# Patient Record
Sex: MALE | Race: WHITE | Employment: OTHER | URBAN - METROPOLITAN AREA
[De-identification: names, ages, dates, MRNs, and addresses within clinical notes are randomized per-mention and may not be internally consistent; named-entity substitution may affect disease eponyms.]

---

## 2021-08-19 ENCOUNTER — APPOINTMENT (OUTPATIENT)
Dept: CT IMAGING | Age: 71
DRG: 177 | End: 2021-08-19
Payer: MEDICARE

## 2021-08-19 ENCOUNTER — APPOINTMENT (OUTPATIENT)
Dept: GENERAL RADIOLOGY | Age: 71
DRG: 177 | End: 2021-08-19
Payer: MEDICARE

## 2021-08-19 ENCOUNTER — HOSPITAL ENCOUNTER (INPATIENT)
Age: 71
LOS: 12 days | Discharge: HOME | DRG: 177 | End: 2021-08-31
Attending: EMERGENCY MEDICINE | Admitting: INTERNAL MEDICINE
Payer: MEDICARE

## 2021-08-19 DIAGNOSIS — J18.9 PNEUMONIA OF BOTH LOWER LOBES DUE TO INFECTIOUS ORGANISM: ICD-10-CM

## 2021-08-19 DIAGNOSIS — J96.01 ACUTE RESPIRATORY FAILURE WITH HYPOXIA (HCC): ICD-10-CM

## 2021-08-19 DIAGNOSIS — Z20.822 SUSPECTED COVID-19 VIRUS INFECTION: Primary | ICD-10-CM

## 2021-08-19 LAB
A/G RATIO: 1 (ref 1.1–2.2)
ALBUMIN SERPL-MCNC: 3.3 G/DL (ref 3.4–5)
ALP BLD-CCNC: 47 U/L (ref 40–129)
ALT SERPL-CCNC: 34 U/L (ref 10–40)
ANION GAP SERPL CALCULATED.3IONS-SCNC: 15 MMOL/L (ref 3–16)
AST SERPL-CCNC: 49 U/L (ref 15–37)
BASOPHILS ABSOLUTE: 0 K/UL (ref 0–0.2)
BASOPHILS RELATIVE PERCENT: 0.5 %
BILIRUB SERPL-MCNC: 0.8 MG/DL (ref 0–1)
BUN BLDV-MCNC: 22 MG/DL (ref 7–20)
CALCIUM SERPL-MCNC: 8.6 MG/DL (ref 8.3–10.6)
CHLORIDE BLD-SCNC: 99 MMOL/L (ref 99–110)
CO2: 21 MMOL/L (ref 21–32)
CREAT SERPL-MCNC: 1.1 MG/DL (ref 0.8–1.3)
D DIMER: 1938 NG/ML DDU (ref 0–229)
EOSINOPHILS ABSOLUTE: 0 K/UL (ref 0–0.6)
EOSINOPHILS RELATIVE PERCENT: 0 %
FERRITIN: 3043 NG/ML (ref 30–400)
GFR AFRICAN AMERICAN: >60
GFR NON-AFRICAN AMERICAN: >60
GLOBULIN: 3.3 G/DL
GLUCOSE BLD-MCNC: 123 MG/DL (ref 70–99)
HCT VFR BLD CALC: 42.6 % (ref 40.5–52.5)
HEMOGLOBIN: 14.5 G/DL (ref 13.5–17.5)
LACTATE DEHYDROGENASE: 492 U/L (ref 100–190)
LACTIC ACID: 1.9 MMOL/L (ref 0.4–2)
LYMPHOCYTES ABSOLUTE: 0.7 K/UL (ref 1–5.1)
LYMPHOCYTES RELATIVE PERCENT: 10.1 %
MCH RBC QN AUTO: 29.6 PG (ref 26–34)
MCHC RBC AUTO-ENTMCNC: 34.1 G/DL (ref 31–36)
MCV RBC AUTO: 86.9 FL (ref 80–100)
MONOCYTES ABSOLUTE: 0.3 K/UL (ref 0–1.3)
MONOCYTES RELATIVE PERCENT: 4.8 %
NEUTROPHILS ABSOLUTE: 6 K/UL (ref 1.7–7.7)
NEUTROPHILS RELATIVE PERCENT: 84.6 %
PDW BLD-RTO: 14.2 % (ref 12.4–15.4)
PLATELET # BLD: 170 K/UL (ref 135–450)
PMV BLD AUTO: 7.7 FL (ref 5–10.5)
POTASSIUM REFLEX MAGNESIUM: 4 MMOL/L (ref 3.5–5.1)
PRO-BNP: 216 PG/ML (ref 0–124)
PROCALCITONIN: 0.11 NG/ML (ref 0–0.15)
RBC # BLD: 4.91 M/UL (ref 4.2–5.9)
SODIUM BLD-SCNC: 135 MMOL/L (ref 136–145)
TOTAL CK: 325 U/L (ref 39–308)
TOTAL PROTEIN: 6.6 G/DL (ref 6.4–8.2)
TROPONIN: <0.01 NG/ML
WBC # BLD: 7.1 K/UL (ref 4–11)

## 2021-08-19 PROCEDURE — 6370000000 HC RX 637 (ALT 250 FOR IP): Performed by: PHYSICIAN ASSISTANT

## 2021-08-19 PROCEDURE — 2580000003 HC RX 258: Performed by: INTERNAL MEDICINE

## 2021-08-19 PROCEDURE — 6360000004 HC RX CONTRAST MEDICATION: Performed by: EMERGENCY MEDICINE

## 2021-08-19 PROCEDURE — XW033E5 INTRODUCTION OF REMDESIVIR ANTI-INFECTIVE INTO PERIPHERAL VEIN, PERCUTANEOUS APPROACH, NEW TECHNOLOGY GROUP 5: ICD-10-PCS | Performed by: EMERGENCY MEDICINE

## 2021-08-19 PROCEDURE — 82550 ASSAY OF CK (CPK): CPT

## 2021-08-19 PROCEDURE — 82728 ASSAY OF FERRITIN: CPT

## 2021-08-19 PROCEDURE — 96375 TX/PRO/DX INJ NEW DRUG ADDON: CPT

## 2021-08-19 PROCEDURE — 84145 PROCALCITONIN (PCT): CPT

## 2021-08-19 PROCEDURE — 83615 LACTATE (LD) (LDH) ENZYME: CPT

## 2021-08-19 PROCEDURE — 80053 COMPREHEN METABOLIC PANEL: CPT

## 2021-08-19 PROCEDURE — 99284 EMERGENCY DEPT VISIT MOD MDM: CPT

## 2021-08-19 PROCEDURE — 1200000000 HC SEMI PRIVATE

## 2021-08-19 PROCEDURE — 87040 BLOOD CULTURE FOR BACTERIA: CPT

## 2021-08-19 PROCEDURE — 6360000002 HC RX W HCPCS: Performed by: PHYSICIAN ASSISTANT

## 2021-08-19 PROCEDURE — XW033H5 INTRODUCTION OF TOCILIZUMAB INTO PERIPHERAL VEIN, PERCUTANEOUS APPROACH, NEW TECHNOLOGY GROUP 5: ICD-10-PCS | Performed by: EMERGENCY MEDICINE

## 2021-08-19 PROCEDURE — 96374 THER/PROPH/DIAG INJ IV PUSH: CPT

## 2021-08-19 PROCEDURE — 2700000000 HC OXYGEN THERAPY PER DAY

## 2021-08-19 PROCEDURE — 84484 ASSAY OF TROPONIN QUANT: CPT

## 2021-08-19 PROCEDURE — 71045 X-RAY EXAM CHEST 1 VIEW: CPT

## 2021-08-19 PROCEDURE — 2580000003 HC RX 258: Performed by: PHYSICIAN ASSISTANT

## 2021-08-19 PROCEDURE — 85025 COMPLETE CBC W/AUTO DIFF WBC: CPT

## 2021-08-19 PROCEDURE — U0005 INFEC AGEN DETEC AMPLI PROBE: HCPCS

## 2021-08-19 PROCEDURE — 85379 FIBRIN DEGRADATION QUANT: CPT

## 2021-08-19 PROCEDURE — 6360000002 HC RX W HCPCS: Performed by: INTERNAL MEDICINE

## 2021-08-19 PROCEDURE — 83880 ASSAY OF NATRIURETIC PEPTIDE: CPT

## 2021-08-19 PROCEDURE — U0003 INFECTIOUS AGENT DETECTION BY NUCLEIC ACID (DNA OR RNA); SEVERE ACUTE RESPIRATORY SYNDROME CORONAVIRUS 2 (SARS-COV-2) (CORONAVIRUS DISEASE [COVID-19]), AMPLIFIED PROBE TECHNIQUE, MAKING USE OF HIGH THROUGHPUT TECHNOLOGIES AS DESCRIBED BY CMS-2020-01-R: HCPCS

## 2021-08-19 PROCEDURE — 94761 N-INVAS EAR/PLS OXIMETRY MLT: CPT

## 2021-08-19 PROCEDURE — 71260 CT THORAX DX C+: CPT

## 2021-08-19 PROCEDURE — 36415 COLL VENOUS BLD VENIPUNCTURE: CPT

## 2021-08-19 PROCEDURE — 83605 ASSAY OF LACTIC ACID: CPT

## 2021-08-19 PROCEDURE — 93005 ELECTROCARDIOGRAM TRACING: CPT | Performed by: PHYSICIAN ASSISTANT

## 2021-08-19 RX ORDER — ACETAMINOPHEN 500 MG
1000 TABLET ORAL ONCE
Status: COMPLETED | OUTPATIENT
Start: 2021-08-19 | End: 2021-08-19

## 2021-08-19 RX ORDER — AZITHROMYCIN 250 MG/1
500 TABLET, FILM COATED ORAL DAILY
Status: DISCONTINUED | OUTPATIENT
Start: 2021-08-20 | End: 2021-08-20

## 2021-08-19 RX ORDER — IPRATROPIUM BROMIDE AND ALBUTEROL SULFATE 2.5; .5 MG/3ML; MG/3ML
1 SOLUTION RESPIRATORY (INHALATION) EVERY 4 HOURS PRN
Status: DISCONTINUED | OUTPATIENT
Start: 2021-08-19 | End: 2021-08-25

## 2021-08-19 RX ORDER — DEXAMETHASONE SODIUM PHOSPHATE 10 MG/ML
6 INJECTION, SOLUTION INTRAMUSCULAR; INTRAVENOUS ONCE
Status: COMPLETED | OUTPATIENT
Start: 2021-08-19 | End: 2021-08-19

## 2021-08-19 RX ORDER — SODIUM CHLORIDE 9 MG/ML
INJECTION, SOLUTION INTRAVENOUS CONTINUOUS
Status: DISCONTINUED | OUTPATIENT
Start: 2021-08-19 | End: 2021-08-22

## 2021-08-19 RX ADMIN — SODIUM CHLORIDE: 9 INJECTION, SOLUTION INTRAVENOUS at 22:48

## 2021-08-19 RX ADMIN — IOPAMIDOL 75 ML: 755 INJECTION, SOLUTION INTRAVENOUS at 13:31

## 2021-08-19 RX ADMIN — ACETAMINOPHEN 1000 MG: 500 TABLET ORAL at 12:41

## 2021-08-19 RX ADMIN — ENOXAPARIN SODIUM 40 MG: 40 INJECTION SUBCUTANEOUS at 22:42

## 2021-08-19 RX ADMIN — DEXAMETHASONE SODIUM PHOSPHATE 6 MG: 10 INJECTION, SOLUTION INTRAMUSCULAR; INTRAVENOUS at 12:55

## 2021-08-19 RX ADMIN — Medication 1000 MG: at 15:39

## 2021-08-19 RX ADMIN — AZITHROMYCIN MONOHYDRATE 500 MG: 500 INJECTION, POWDER, LYOPHILIZED, FOR SOLUTION INTRAVENOUS at 15:46

## 2021-08-19 ASSESSMENT — ENCOUNTER SYMPTOMS
CONSTIPATION: 0
WHEEZING: 0
CHEST TIGHTNESS: 0
COUGH: 1
EYE REDNESS: 0
BACK PAIN: 0
ABDOMINAL PAIN: 0
STRIDOR: 0
EYE DISCHARGE: 0
NAUSEA: 0
COLOR CHANGE: 0
DIARRHEA: 0
VOMITING: 0
SHORTNESS OF BREATH: 1

## 2021-08-19 ASSESSMENT — PAIN SCALES - GENERAL
PAINLEVEL_OUTOF10: 0
PAINLEVEL_OUTOF10: 7
PAINLEVEL_OUTOF10: 0

## 2021-08-19 NOTE — ED NOTES
EXAMINATION:  CHEST SINGLE (PORTABLE)    



INDICATION: Shortness of breath, respiratory distress



COMPARISON: Multiple prior chest radiograph, most recently of 9/15/2019

     

FINDINGS:



LINES/TUBES:Right IJ central venous catheter terminates at the superior

cavoatrial junction. EKG leads overlie the chest.



LUNGS:The lungs are moderately inflated. There is perihilar fullness and

indistinctness of the pulmonary vasculature.



PLEURA:Small circumferential right pleural effusion. No pneumothorax.



MEDIASTINUM:Cardiomediastinal silhouette is stably enlarged.



BONES/SOFT TISSUES:No acute osseous injury.



ABDOMEN:No free air under the diaphragm.





IMPRESSION: 

Continued interval worsening of pulmonary edema. Unchanged cardiomegaly.



Small circumferential right pleural effusion.



Signed by: Kelechi Mendez MD on 9/16/2019 8:32 AM Report to [facility] on 5T.   Up to floor per meaghan Robles RN  08/19/21 5080

## 2021-08-19 NOTE — ED PROVIDER NOTES
[facility]        Pt Name: Vargas Carvalho  MRN: [id number]  [place] 1950  Date of evaluation: 8/19/2021  Provider: QUINITN Crenshaw  PCP: Anders Morse MD  Note Started: 12:18 PM EDT        I have seen and evaluated this patient with my supervising physician Matt Jarvis, [facility]       Chief Complaint   Patient presents with    Shortness of Breath     Pt endorses shortness of breath and cough for \"a couple weeks. \" Pt is not vaccinated against covid-19. Pt arrived on RA with oxygen saturation 83%. Pt placed on 6L NC with rebound to 94%.  Cough       HISTORY OF PRESENT ILLNESS   (Location, Timing/Onset, Context/Setting, Quality, Duration, Modifying Factors, Severity, Associated Signs and Symptoms)  Note limiting factors. Chief Complaint: SOB/Cough     Vargas Carvalho is a 69 y.o. male with no significant past medical history who presents to the ED with complaint of shortness of breath. Patient states he has not been feeling well for the past couple weeks. Patient states he has had nonproductive cough. Patient states he had increasing shortness of breath over the past week. Arrived to the emergency department was 83% on room air. Patient placed on 6 L nasal cannula oxygen for hypoxia. Patient states he has shortness of breath worsened with exertion and associated cough. Denies any fever chills. Denies headache, lightheadedness/dizziness, diaphoresis, chest pain, pleuritic pain, hemoptysis, orthopnea, pedal edema or calf tenderness. Denies abdominal pain, nausea/vomiting, urinary symptoms or changes in bowel movements. Patient states he believes he got sick from his grandson. Patient denies any known exposure to positive COVID-19 persons. Patient states he has not been vaccinated against COVID-19. Patient states is not a smoker. Denies history of COPD or asthma.   Denies history of heart or lung problems. Denies history of CHF. Patient denies any history of DVT or PE in the past.  Denies any recent travel, trips, surgery or immobilization. Denies any hormone usage or blood thinning medication usage. Nursing Notes were all reviewed and agreed with or any disagreements were addressed in the HPI. REVIEW OF SYSTEMS    (2-9 systems for level 4, 10 or more for level 5)     Review of Systems   Constitutional: Negative for activity change, appetite change, chills, diaphoresis, fatigue and fever. Eyes: Negative for discharge and redness. Respiratory: Positive for cough and shortness of breath. Negative for chest tightness, wheezing and stridor. Cardiovascular: Negative. Negative for chest pain, palpitations and leg swelling. Gastrointestinal: Negative for abdominal pain, constipation, diarrhea, nausea and vomiting. Genitourinary: Negative for decreased urine volume, difficulty urinating, dysuria, flank pain, frequency, hematuria and urgency. Musculoskeletal: Negative for arthralgias, back pain, myalgias, neck pain and neck stiffness. Skin: Negative for color change, pallor, rash and wound. Neurological: Negative for dizziness, light-headedness and headaches. Positives and Pertinent negatives as per HPI. Except as noted above in the ROS, all other systems were reviewed and negative. PAST MEDICAL HISTORY   History reviewed. No pertinent past medical history. SURGICAL HISTORY     Past Surgical History:   Procedure Laterality Date    APPENDECTOMY      SKIN BIOPSY      TONSILLECTOMY           CURRENTMEDICATIONS       Previous Medications    MUPIROCIN (BACTROBAN) 2 % CREAM    Apply  topically 3 times daily. ALLERGIES     Patient has no known allergies.     FAMILYHISTORY       Family History   Problem Relation Age of Onset    Diabetes Father     Stroke Father           SOCIAL HISTORY       Social History     Tobacco Use    Smoking status: Never Smoker   Substance Use Topics    Alcohol use: No    Drug use: No       SCREENINGS             PHYSICAL EXAM    (up to 7 for level 4, 8 or more for level 5)     ED Triage Vitals [08/19/21 1208]   BP Temp Temp Source Pulse Resp SpO2 Height Weight   (!) 150/71 101.5 °F (38.6 °C) Oral 77 22 95 % 5' 10\" (1.778 m) 220 lb (99.8 kg)       Physical Exam  Constitutional:       General: He is not in acute distress. Appearance: He is well-developed. He is not ill-appearing, toxic-appearing or diaphoretic. HENT:      Head: Normocephalic and atraumatic. Right Ear: External ear normal.      Left Ear: External ear normal.      Mouth/Throat:      Mouth: Mucous membranes are moist.      Pharynx: No oropharyngeal exudate or posterior oropharyngeal erythema. Eyes:      General:         Right eye: No discharge. Left eye: No discharge. Conjunctiva/sclera: Conjunctivae normal.   Cardiovascular:      Rate and Rhythm: Normal rate and regular rhythm. Pulses: Normal pulses. Heart sounds: Normal heart sounds. No murmur heard. No friction rub. No gallop. Comments: 2+ radial pulses bilaterally. No pedal edema. No calf tenderness. No JVD. Pulmonary:      Effort: Pulmonary effort is normal. No respiratory distress. Breath sounds: Normal breath sounds. No stridor. No wheezing, rhonchi or rales. Comments: Patient 83% on room air upon arrival.  Patient was on 6 L nasal cannula oxygen when I enter the room. Able to wean down to 4 L nasal cannula oxygen with oxygen saturation at 93 to 94%. No wheezing rales or rhonchi. Chest:      Chest wall: No tenderness. Abdominal:      General: Abdomen is flat. Bowel sounds are normal. There is no distension. Palpations: Abdomen is soft. There is no mass. Tenderness: There is no abdominal tenderness. There is no right CVA tenderness, left CVA tenderness, guarding or rebound. Hernia: No hernia is present.    Musculoskeletal:         General: Normal range of [address],  [address], [address]   Phone [phone]   D-DIMER, QUANTITATIVE - Abnormal; Notable for the following components:    D-Dimer, Quant 1939 (*)     All other components within normal limits    Narrative:     Performed at:  [facility] [address],  [address], [address]   Phone [phone]   CULTURE, BLOOD 1   CULTURE, BLOOD 2   TROPONIN    Narrative:     Performed at:  [facility] [address],  [address] [address], [address]   Phone [phone]   PROCALCITONIN    Narrative:     Performed at:  [facility] [address],  [address], [address]   Phone [phone]   LACTIC ACID, PLASMA    Narrative:     Performed at:  [facility] [address],  [address], [address]   Phone [phone]       When ordered only abnormal lab results are displayed. All other labs were within normal range or not returned as of this dictation. EKG: When ordered, EKG's are interpreted by the Emergency Department Physician in the absence of a cardiologist.  Please see their note for interpretation of EKG. RADIOLOGY:   Non-plain film images such as CT, Ultrasound and MRI are read by the radiologist. Plain radiographic images are visualized and preliminarily interpreted by the ED Provider with the below findings:        Interpretation per the Radiologist below, if available at the time of this note:    CT CHEST PULMONARY EMBOLISM W CONTRAST   Final Result   Scattered ground-glass opacities throughout the lungs bilaterally consistent   with atypical pneumonia. No evidence for acute or chronic pulmonary embolism. XR CHEST PORTABLE   Final Result   Scattered bilateral pulmonary infiltrates consistent with pneumonia. No results found.         PROCEDURES   Unless otherwise noted below, none     Procedures    CRITICAL CARE TIME   The total critical care time spent while evaluating and treating this patient was 33 minutes. This excludes time spent doing separately billable procedures. This includes time at the bedside, data interpretation, medication management, obtaining critical history from collateral sources if the patient is unable to provide it directly, and physician consultation. Specifics of interventions taken and potentially life-threatening diagnostic considerations are listed above in the medical decision making. CONSULTS:  None      EMERGENCY DEPARTMENT COURSE and DIFFERENTIAL DIAGNOSIS/MDM:   Vitals:    Vitals:    08/19/21 1215 08/19/21 1300 08/19/21 1315 08/19/21 1330   BP:  135/67 129/60 119/65   Pulse:       Resp:  28 30 22   Temp:       TempSrc:       SpO2: 93% 96% 94% 95%   Weight:       Height:           Patient was given the following medications:  Medications   cefTRIAXone (ROCEPHIN) 1000 mg in sterile water 10 mL IV syringe (has no administration in time range)   azithromycin (ZITHROMAX) 500 mg in D5W 250ml Vial Mate (has no administration in time range)   dexamethasone (PF) (DECADRON) injection 6 mg (6 mg Intravenous Given 8/19/21 1255)   acetaminophen (TYLENOL) tablet 1,000 mg (1,000 mg Oral Given 8/19/21 1241)   iopamidol (ISOVUE-370) 76 % injection 75 mL (75 mLs Intravenous Given 8/19/21 1331)           Patient is a 39-year-old male who presents to the ED with complaint of shortness of breath and cough. Has been sick for the past 2 weeks worse over the past week. Patient initially told me that no one at home was sick with COVID-19 but then told attending that grandson tested positive for COVID-19 recently. Patient is not vaccinated. Arrived to the emergency department was 83% on room air. Patient was on 6 L nasal cannula oxygen but able to be weaned down to 4 L nasal cannula oxygen with oxygen saturation 94 to 95%. Patient given Tylenol for fever here in the ED. IV established and blood work obtained.   Given 6 mg of IV Decadron given hypoxia and suspected COVID-19 infection. Covid swab ordered. CBC showed normal white count, hemoglobin and platelets. CMP relatively unremarkable. Troponin was normal.  . . . Ferritin pending. Procalcitonin normal.  Lactic acid normal.  D-dimer 1938. EKG interpreted by attending. Chest x-ray showed bilateral pulmonary infiltrates concerning for pneumonia. CT of the chest obtained given concern for COVID-19 with elevated D-dimer. CT showed Groundglass opacities concerning for atypical pneumonia. Did give Rocephin and azithromycin for antibiotic coverage but believe most likely viral at this time given patient's suspected COVID-19 infection with positive Covid contacts at home with normal white count, lactic acid and procalcitonin. Patient will require admission given hypoxia and supplemental oxygen requirement. Case discussed with Dr. Freddie Griggs who graciously agreed accept patient for admission at this time. FINAL IMPRESSION      1. Suspected COVID-19 virus infection    2. Acute respiratory failure with hypoxia (HCC)    3. Pneumonia of both lower lobes due to infectious organism          DISPOSITION/PLAN   DISPOSITION Decision To Admit 08/19/2021 02:16:19 PM      PATIENT REFERRED TO:  No follow-up provider specified.     DISCHARGE MEDICATIONS:  New Prescriptions    No medications on file       DISCONTINUED MEDICATIONS:  Discontinued Medications    No medications on file              (Please note that portions of this note were completed with a voice recognition program.  Efforts were made to edit the dictations but occasionally words are mis-transcribed.)    QUINTIN Fleming (electronically signed)          QUINTIN Desai  08/19/21 3584

## 2021-08-19 NOTE — PROGRESS NOTES
Patient has arrived to unit in stable condition via w/c propelled by jacey SHIN. Vitals stable. Patient is awake, alert and oriented, though lethargy noted. Pt with noted diaphoresis. Respirations unlabored at rest on 4L NC. Patient does not appear to be in distress. Patient oriented to room and call light. Plan of care discussed with patient, patient agreeable. Call light within reach.

## 2021-08-19 NOTE — ED NOTES
Pt was able to be weaned from 6L NC to 4L NC after sat recovery. Pt is currently on 4L NC with oxygen saturation at 93%.      Toni Steele RN  08/19/21 4146

## 2021-08-19 NOTE — ED PROVIDER NOTES
I independently performed a history and physical on Rosenda Soliman. All diagnostic, treatment, and disposition decisions were made by myself in conjunction with the advanced practice provider. Briefly, this is a 69 y.o. male here for shortness of breath. Patient reports feeling ill with malaise and muscle aches for the past 2 weeks, his symptoms began to worsen a couple days ago. Has been in contact with his 66-year-old grandson who tested positive for COVID-19. Patient is not vaccinated. He denies any chest pain or other painful complaints. Patient noted to have oxygenation of 83% on room air at time of arrival to emergency department. He denies any prior history of tobacco use or pulmonary disease. On exam, patient febrile, mildly ill-appearing however nontoxic. No distress. Heart RRR. Lungs CTAB. Abdomen soft, nondistended, nontender to palpation in all quadrants. EKG  EKG was reviewed by emergency department physician in the absence of a cardiologist    Narrow complex sinus rhythm, rate 72, normal axis, normal MN and QRS intervals, normal Qtc, no ST elevations or depressions, normal t-wave morphology, impression NSR, no STEMI, no comparison available      Screenings            MDM    Patient febrile, nontoxic. Patient's initial hypoxia was corrected with supplemental oxygen by nasal cannula, he appears in no respiratory distress. Patient requiring 4 L O2 to maintain oxygen saturation greater than 92%. EKG without evidence of acute ischemia, troponin normal, ACS or malignant dysrhythmia is not evident. CTA shows no pulmonary embolism, pneumothorax or mediastinal abnormality, there are multi focal groundglass infiltrates consistent with atypical pneumonia. Given patient's family exposures and presentation my clinical suspicion for COVID-19 pneumonia is very high. Although I have lower suspicion for bacterial pneumonia, will cover with ceftriaxone/azithromycin pending further evaluation. Patient received Decadron for hypoxic respiratory failure in the setting of COVID-19. Case discussed with internal medicine team and will admit for further evaluation and care. Patient remained alert, no distress and hemodynamically stable with corrected hypoxia at time of admission. Critical care:    I have discussed the case with the advanced practice provider. I have personally performed a history, physical exam, and my own medical decision making. I have reviewed the note and agree with the findings and plan. Upon my evaluation, this patient had a high probability of imminent or life-threatening deterioration due to acute hypoxic respiratory failure which required my direct attention, intervention, and personal management. The total critical care time personally spent while evaluating and treating this patient was 32 minutes exclusive of any time spent doing separately billable procedures. This includes time at the bedside, data interpretation, medication management, monitoring for potential decompensation and physician consultation. Specifics of interventions taken and potentially life-threatening diagnostic considerations are listed above in the medical decision making. Patient Referrals:  No follow-up provider specified. Discharge Medications:  New Prescriptions    No medications on file       FINAL IMPRESSION  1. Suspected COVID-19 virus infection    2. Acute respiratory failure with hypoxia (HCC)    3. Pneumonia of both lower lobes due to infectious organism        Blood pressure 135/74, pulse 56, temperature 97.8 °F (36.6 °C), temperature source Oral, resp. rate 24, height 5' 10\" (1.778 m), weight 220 lb (99.8 kg), SpO2 99 %. For further details of [facility] emergency department encounter, please see documentation by advanced practice provider, QUINTIN Bermudez.     Ab Del Valle DO (electronically signed)  Attending Emergency Physician       Ab Del Valle DO  08/19/21 E[place]  80.

## 2021-08-20 PROBLEM — I10 ESSENTIAL HYPERTENSION: Status: ACTIVE | Noted: 2021-08-20

## 2021-08-20 PROBLEM — J96.01 ACUTE RESPIRATORY FAILURE WITH HYPOXIA (HCC): Status: ACTIVE | Noted: 2021-08-20

## 2021-08-20 PROBLEM — U07.1 COVID-19: Status: ACTIVE | Noted: 2021-08-20

## 2021-08-20 LAB
A/G RATIO: 0.9 (ref 1.1–2.2)
ALBUMIN SERPL-MCNC: 3.1 G/DL (ref 3.4–5)
ALP BLD-CCNC: 49 U/L (ref 40–129)
ALT SERPL-CCNC: 32 U/L (ref 10–40)
ANION GAP SERPL CALCULATED.3IONS-SCNC: 11 MMOL/L (ref 3–16)
AST SERPL-CCNC: 39 U/L (ref 15–37)
BASE EXCESS ARTERIAL: -1 MMOL/L (ref -3–3)
BILIRUB SERPL-MCNC: 0.5 MG/DL (ref 0–1)
BUN BLDV-MCNC: 25 MG/DL (ref 7–20)
C-REACTIVE PROTEIN: 108.7 MG/L (ref 0–5.1)
CALCIUM SERPL-MCNC: 9 MG/DL (ref 8.3–10.6)
CARBOXYHEMOGLOBIN ARTERIAL: 0.9 % (ref 0–1.5)
CHLORIDE BLD-SCNC: 101 MMOL/L (ref 99–110)
CO2: 25 MMOL/L (ref 21–32)
CREAT SERPL-MCNC: 0.9 MG/DL (ref 0.8–1.3)
EKG ATRIAL RATE: 72 BPM
EKG DIAGNOSIS: NORMAL
EKG P AXIS: 41 DEGREES
EKG P-R INTERVAL: 148 MS
EKG Q-T INTERVAL: 390 MS
EKG QRS DURATION: 74 MS
EKG QTC CALCULATION (BAZETT): 427 MS
EKG R AXIS: 55 DEGREES
EKG T AXIS: 45 DEGREES
EKG VENTRICULAR RATE: 72 BPM
GFR AFRICAN AMERICAN: >60
GFR NON-AFRICAN AMERICAN: >60
GLOBULIN: 3.3 G/DL
GLUCOSE BLD-MCNC: 152 MG/DL (ref 70–99)
HCO3 ARTERIAL: 21.7 MMOL/L (ref 21–29)
HCT VFR BLD CALC: 43 % (ref 40.5–52.5)
HEMOGLOBIN, ART, EXTENDED: 15.2 G/DL (ref 13.5–17.5)
HEMOGLOBIN: 14.6 G/DL (ref 13.5–17.5)
MCH RBC QN AUTO: 29.4 PG (ref 26–34)
MCHC RBC AUTO-ENTMCNC: 34 G/DL (ref 31–36)
MCV RBC AUTO: 86.4 FL (ref 80–100)
METHEMOGLOBIN ARTERIAL: 0.2 %
O2 SAT, ARTERIAL: 93.2 %
O2 THERAPY: ABNORMAL
PCO2 ARTERIAL: 30.5 MMHG (ref 35–45)
PDW BLD-RTO: 14.3 % (ref 12.4–15.4)
PH ARTERIAL: 7.46 (ref 7.35–7.45)
PLATELET # BLD: 185 K/UL (ref 135–450)
PMV BLD AUTO: 8.2 FL (ref 5–10.5)
PO2 ARTERIAL: 64.7 MMHG (ref 75–108)
POTASSIUM SERPL-SCNC: 4.3 MMOL/L (ref 3.5–5.1)
RBC # BLD: 4.97 M/UL (ref 4.2–5.9)
SARS-COV-2: DETECTED
SODIUM BLD-SCNC: 137 MMOL/L (ref 136–145)
TCO2 ARTERIAL: 50.7 MMOL/L
TOTAL PROTEIN: 6.4 G/DL (ref 6.4–8.2)
WBC # BLD: 6.7 K/UL (ref 4–11)

## 2021-08-20 PROCEDURE — 2580000003 HC RX 258: Performed by: INTERNAL MEDICINE

## 2021-08-20 PROCEDURE — 2500000003 HC RX 250 WO HCPCS: Performed by: INTERNAL MEDICINE

## 2021-08-20 PROCEDURE — 6370000000 HC RX 637 (ALT 250 FOR IP): Performed by: INTERNAL MEDICINE

## 2021-08-20 PROCEDURE — 82803 BLOOD GASES ANY COMBINATION: CPT

## 2021-08-20 PROCEDURE — 36600 WITHDRAWAL OF ARTERIAL BLOOD: CPT

## 2021-08-20 PROCEDURE — 99223 1ST HOSP IP/OBS HIGH 75: CPT | Performed by: INTERNAL MEDICINE

## 2021-08-20 PROCEDURE — 86140 C-REACTIVE PROTEIN: CPT

## 2021-08-20 PROCEDURE — 80053 COMPREHEN METABOLIC PANEL: CPT

## 2021-08-20 PROCEDURE — 93010 ELECTROCARDIOGRAM REPORT: CPT | Performed by: INTERNAL MEDICINE

## 2021-08-20 PROCEDURE — 1200000000 HC SEMI PRIVATE

## 2021-08-20 PROCEDURE — 36415 COLL VENOUS BLD VENIPUNCTURE: CPT

## 2021-08-20 PROCEDURE — 6360000002 HC RX W HCPCS: Performed by: INTERNAL MEDICINE

## 2021-08-20 PROCEDURE — 85027 COMPLETE CBC AUTOMATED: CPT

## 2021-08-20 RX ORDER — DEXAMETHASONE SODIUM PHOSPHATE 10 MG/ML
6 INJECTION, SOLUTION INTRAMUSCULAR; INTRAVENOUS DAILY
Status: DISCONTINUED | OUTPATIENT
Start: 2021-08-20 | End: 2021-08-20

## 2021-08-20 RX ORDER — 0.9 % SODIUM CHLORIDE 0.9 %
30 INTRAVENOUS SOLUTION INTRAVENOUS PRN
Status: DISCONTINUED | OUTPATIENT
Start: 2021-08-20 | End: 2021-08-31 | Stop reason: HOSPADM

## 2021-08-20 RX ADMIN — SODIUM CHLORIDE: 9 INJECTION, SOLUTION INTRAVENOUS at 23:51

## 2021-08-20 RX ADMIN — MUPIROCIN: 20 OINTMENT TOPICAL at 10:11

## 2021-08-20 RX ADMIN — MUPIROCIN: 20 OINTMENT TOPICAL at 20:46

## 2021-08-20 RX ADMIN — REMDESIVIR 200 MG: 100 INJECTION, POWDER, LYOPHILIZED, FOR SOLUTION INTRAVENOUS at 13:03

## 2021-08-20 RX ADMIN — DEXAMETHASONE SODIUM PHOSPHATE 6 MG: 10 INJECTION, SOLUTION INTRAMUSCULAR; INTRAVENOUS at 12:58

## 2021-08-20 RX ADMIN — TOCILIZUMAB 810 MG: 180 INJECTION, SOLUTION SUBCUTANEOUS at 17:38

## 2021-08-20 RX ADMIN — ENOXAPARIN SODIUM 60 MG: 60 INJECTION SUBCUTANEOUS at 12:57

## 2021-08-20 RX ADMIN — ENOXAPARIN SODIUM 100 MG: 100 INJECTION SUBCUTANEOUS at 20:45

## 2021-08-20 RX ADMIN — SODIUM CHLORIDE 990 ML: 9 INJECTION, SOLUTION INTRAVENOUS at 10:17

## 2021-08-20 RX ADMIN — ENOXAPARIN SODIUM 40 MG: 40 INJECTION SUBCUTANEOUS at 10:10

## 2021-08-20 RX ADMIN — AZITHROMYCIN MONOHYDRATE 500 MG: 250 TABLET ORAL at 10:10

## 2021-08-20 ASSESSMENT — PAIN SCALES - GENERAL
PAINLEVEL_OUTOF10: 0

## 2021-08-20 NOTE — PROGRESS NOTES
4 Eyes Skin Assessment     The patient is being assess for  Admission    I agree that 2 RN's have performed a thorough Head to Toe Skin Assessment on the patient. ALL assessment sites listed below have been assessed. Areas assessed by both nurses:   [x]   Head, Face, and Ears   [x]   Shoulders, Back, and Chest  [x]   Arms, Elbows, and Hands   [x]   Coccyx, Sacrum, and IschIum  [x]   Legs, Feet, and Heels        Does the Patient have Skin Breakdown?   No         Ricardo Prevention initiated:  NA   Wound Care Orders initiated:  NA      WO nurse consulted for Pressure Injury (Stage 3,4, Unstageable, DTI, NWPT, and Complex wounds), New and Established Ostomies:  NA      Nurse 1 eSignature: Electronically signed by Bambi Jameson RN on 8/20/21 at 3:56 AM EDT    **SHARE this note so that the co-signing nurse is able to place an eSignature**    Nurse 2 eSignature: {Esignature:[id number]}

## 2021-08-20 NOTE — CONSULTS
[facility] Pulmonary and Critical Care   Consult Note      Reason for Consult: Acute hypoxic respiratory failure/COVID-19 pneumonia  Requesting Physician: Diallo Kennedy    Subjective:   CHIEF COMPLAINT: Fatigue and shortness of breath     HPI: Patient states that he has been short of breath and has had a slight nonproductive cough for the last couple of weeks. Has been feeling generally unwell but denies fevers. Wife and grandson both diagnosed with COVID-19 infection. Noted to be hypoxic in the ER-requiring 5 to 6 L O2. Pulmonary consultation requested for advice on management of COVID-19 infection. Hypertension, no prior history of respiratory illness-asthma or COPD. Non-smoker. The patient is a 69 y.o. male with significant past medical history of:      Diagnosis Date    Essential hypertension 8/20/2021    Pneumonia 8/19/2021        Past Surgical History:        Procedure Laterality Date    APPENDECTOMY      SKIN BIOPSY      TONSILLECTOMY       Current Medications:    Current Facility-Administered Medications: dexamethasone (PF) (DECADRON) injection 6 mg, 6 mg, Intravenous, Daily  enoxaparin (LOVENOX) injection 100 mg, 1 mg/kg, Subcutaneous, BID  [COMPLETED] remdesivir 200 mg in sodium chloride 0.9 % 250 mL IVPB, 200 mg, Intravenous, Once **FOLLOWED BY** [START ON 8/21/2021] remdesivir 100 mg in sodium chloride 0.9 % 250 mL IVPB, 100 mg, Intravenous, Q24H  0.9 % sodium chloride bolus, 30 mL, Intravenous, PRN  tocilizumab (ACTEMRA) 810 mg in sodium chloride 0.9 % 100 mL IVPB, 810 mg, Intravenous, Once  mupirocin (BACTROBAN) 2 % ointment, , Topical, BID  ipratropium-albuterol (DUONEB) nebulizer solution 1 ampule, 1 ampule, Inhalation, Q4H PRN  0.9 % sodium chloride infusion, , Intravenous, Continuous    No Known Allergies    Social History:    TOBACCO:   reports that he has never smoked. He does not have any smokeless tobacco history on file. ETOH:   reports no history of alcohol use.   Patient currently lives independently    Family History:       Problem Relation Age of Onset    Diabetes Father     Stroke Father        REVIEW OF SYSTEMS:    Constitutional: Fatigue and malaise  Ears, nose, mouth, throat: negative for ear drainage, epistaxis, hoarseness, nasal congestion, sore throat and voice change  Respiratory: negative except for cough and shortness of breath  Cardiovascular: negative for chest pain, chest pressure/discomfort, irregular heart beat, lower extremity edema and palpitations  Gastrointestinal: negative for abdominal pain, constipation, diarrhea, jaundice, melena, odynophagia, reflux symptoms and vomiting  Hematologic/lymphatic: negative for bleeding, easy bruising, lymphadenopathy and petechiae  Musculoskeletal:negative for arthralgias, bone pain, muscle weakness, neck pain and stiff joints  Neurological: negative for dizziness, gait problems, headaches, seizures, speech problems, tremors and weakness  Behavioral/Psych: negative for anxiety, behavior problems, depression, fatigue and sleep disturbance  Endocrine: negative for diabetic symptoms including none, neuropathy, polyphagia, polyuria, polydipsia, vomiting and diarrhea and temperature intolerance  Allergic/Immunologic: negative for anaphylaxis, angioedema, hay fever and urticaria      Objective:     Patient Vitals for the past 8 hrs:   BP Temp Temp src Pulse Resp SpO2   08/20/21 1230 (!) 161/61 98.1 °F (36.7 °C) Oral 74 18 90 %   08/20/21 0945 135/72 97.5 °F (36.4 °C) Oral 67 18 90 %     I/O last 3 completed shifts: In: 250 [IV Piggyback:250]  Out: -   No intake/output data recorded.     Physical Exam:  General Appearance: alert and oriented to person, place and time, well developed and well- nourished, in no acute distress  Skin: warm and dry, no rash or erythema  Head: normocephalic and atraumatic  Eyes: pupils equal, round, and reactive to light, extraocular eye movements intact, conjunctivae normal  ENT: external ear and ear canal normal bilaterally, nose without deformity, nasal mucosa and turbinates normal  Neck: supple and non-tender without mass, no cervical lymphadenopathy  Pulmonary/Chest: clear to auscultation bilaterally- no wheezes, rales or rhonchi, normal air movement, no respiratory distress  Cardiovascular: normal rate, regular rhythm,  no murmurs, rubs, distal pulses intact, no carotid bruits  Abdomen: soft, non-tender, non-distended, normal bowel sounds, no masses or organomegaly  Lymph Nodes: Cervical, supraclavicular normal  Extremities: no cyanosis, clubbing or edema  Musculoskeletal: normal range of motion, no joint swelling, deformity or tenderness  Neurologic: alert, no focal neurologic deficits    Data Review:  CBC:   Lab Results   Component Value Date    WBC 6.7 08/20/2021    RBC 4.97 08/20/2021     BMP:   Lab Results   Component Value Date    GLUCOSE 152 08/20/2021    CO2 25 08/20/2021    BUN 25 08/20/2021    CREATININE 0.9 08/20/2021    CALCIUM 9.0 08/20/2021     ABG: No results found for: DPB4PUW, BEART, O0ABNEDO, PHART, THGBART, UKW2VNQ, PO2ART, DOI5QZA    Radiology: All pertinent images / reports were reviewed as a part of this visit. Narrative   EXAMINATION:   CTA OF THE CHEST 8/19/2021 1:31 pm       TECHNIQUE:   CTA of the chest was performed after the administration of intravenous   contrast.  Multiplanar reformatted images are provided for review.  MIP   images are provided for review. Dose modulation, iterative reconstruction,   and/or weight based adjustment of the mA/kV was utilized to reduce the   radiation dose to as low as reasonably achievable.       COMPARISON:   None.       HISTORY:   ORDERING SYSTEM PROVIDED HISTORY: hypoxia - covid? ?? - PE   TECHNOLOGIST PROVIDED HISTORY:   Reason for exam:->hypoxia - covid? ?? - PE   Decision Support Exception - unselect if not a suspected or confirmed   emergency medical condition->Emergency Medical Condition (MA)   Reason for Exam: hypoxia - covid? ?? - PE Acuity: Acute   Type of Exam: Initial       FINDINGS:   Pulmonary Arteries: Pulmonary arteries are adequately opacified for   evaluation.  No evidence of intraluminal filling defect to suggest pulmonary   embolism.  Main pulmonary artery is normal in caliber.       Mediastinum: No evidence of mediastinal lymphadenopathy.  The heart and   pericardium demonstrate no acute abnormality.  There is no acute abnormality   of the thoracic aorta.       Lungs/pleura: There is scattered ground-glass opacities throughout the lungs   bilaterally.  There is no effusion. Enma Hu is no pneumothorax.  The   tracheobronchial tree is patent.       Upper Abdomen: Limited images of the upper abdomen are unremarkable.       Soft Tissues/Bones: No acute bone or soft tissue abnormality.           Impression   Scattered ground-glass opacities throughout the lungs bilaterally consistent   with atypical pneumonia.       No evidence for acute or chronic pulmonary embolism. Problem List:   Acute hypoxic respiratory failure  COVID-19 infection    Assessment/Plan:     CT scan was personally reviewed and shows multifocal infiltrates suggestive of COVID-19 pneumonia. Acute hypoxic respiratory failure, currently on 5 L O2. Already on IV Decadron-increased dose to 20 mg daily. Already receiving remdesivir. Patient would benefit from Actemra given O2 needs and elevated CRP of 108. Procalcitonin low, no indication for antibiotics. D-dimer 1938, will increase Lovenox dose to therapeutic dose. Pulmonary will follow.     Jody Hughes MD

## 2021-08-20 NOTE — PROGRESS NOTES
Head to toe assessment completed at this time. Vitals are stable. Pt does not appear to be in distress at this time. Pt has no further needs. Bed alarm engaged, call light in reach.

## 2021-08-20 NOTE — PROGRESS NOTES
Clinical Pharmacy Note    Pharmacy consulted by Dr. Miky Valdes to start Actemra. Earlier today patient was not appropriate for Actemra per oxygen requirement. Now CRP has resulted 108.7 and currently on 4-5L O2. Pulmonary has been consulted. Placing orders for Actemra per consult. Defer to pulmonology if appropriate to continue or not.        Sonia Doss, PharmD  8/20/2021 3:31 PM

## 2021-08-20 NOTE — PROGRESS NOTES
Clinical Pharmacy Note    Pharmacy consulted by Dr. Vladimir Ayers to start Remdesivir and Actemra if appropriate    Patient is COVID positive and currently on 3L O2. Appropriate to start remdesivir at this time  Orders have been placed. Actemra recommended if patient on >4L O2 and if CRP >75. CRP currently pending, but does not meet oxygen criteria to start Actemra. Pharmacy will continue to monitor oxygen requirement and CRP.     Thank you,    Patrick Cannon, PharmD  8/20/2021 11:03 AM

## 2021-08-20 NOTE — H&P
[facility] [address]                     [facility], [address]                              HISTORY AND PHYSICAL    PATIENT NAME: Lele Corbett                      :        1950  MED REC NO:   [id number]                          ROOM:       [id number]  ACCOUNT NO:   [de-identified]                           ADMIT DATE: 2021  PROVIDER:     Franco Londono MD    HISTORY OF PRESENT ILLNESS:  The patient is a 76-year-old white  gentleman came to [facility].  He is otherwise healthy and  does not see a doctor much. His PCP is Dr. Nadia Son who he has not  seen in few years. The patient is feeling ill and malaise for last  couple weeks with muscle aches, now has increasing shortness of breath. His symptoms worsened couple days ago. He has been in contact with a  63-year-old grandson who also tested positive for COVID. The patient  was never vaccinated nor was his grandson. Denied any chest pain. No  abdominal pain. No nausea, vomiting or diarrhea. The patient noted to  have oxygenation of 83% on room air at the time of arrival to the  emergency department. He obviously looked in moderate distress. Denied  any abdominal distention or any nausea, vomiting or diarrhea. PAST MEDICAL HISTORY:  Totally unremarkable. PAST SURGICAL HISTORY:  Pertinent for appendectomy, skin biopsy and  tonsillectomy. FAMILY HISTORY:  His father had diabetes and history of stroke. Mother  apparently  of natural causes. SOCIAL HISTORY:  He is a  man. He has three grown children. He  was always a nonsmoker. He would have an occasional alcoholic beverage. He used to work for [employer]. There is no history of  substance abuse. He lives with his wife in [place]. REVIEW OF SYSTEMS:  Negative for loss of consciousness. No TIA. No  vision disturbance. No swallowing disturbance. No sore throat. No  upper respiratory infection. Denies any headache. Does have  significant resting and exertional shortness of breath. No orthopnea or  paroxysmal nocturnal dyspnea. No abdominal pain. No hematemesis or  melena. No musculoskeletal pain. No neurologic symptoms. PHYSICAL EXAMINATION:  GENERAL:  Alert, awake, oriented x3, moderately distressed 74-year-old  white man looking much younger than his stated age. VITAL SIGNS:  His temperature 101.5, blood pressure 150/71, respirations  22, heart rate is 77. O2 sat is 95% on 6 liters. HEENT:  Oral mucosa dry. SKIN:  Warm and dry. NECK:  Neck is supple. No jugular venous distention. No  lymphadenopathy. LUNGS:  Vesicular breath sounds, bilateral crackles. HEART:  Regular rate and rhythm. S1, S2.  ABDOMEN:  Soft, nontender. Bowel sounds present. EXTREMITIES:  Shows trace edema. NEUROLOGIC:  The patient appears grossly intact. LABORATORY EVALUATION:  Shows sodium 135, potassium 4.0, chloride 99,  CO2 21, BUN 22, creatinine 1.1, lactic acid level 1.9. Blood sugar 123. Total . Total . ProBNP level 216. White blood cell count  7.1, hemoglobin/hematocrit 14.5 and 42.9. Ferritin level 3043. D-dimer  is 1938. Blood cultures have been drawn. COVID-19 is positive. DIAGNOSTIC DATA:  Chest x-ray shows bilateral infiltrative process  consistent with pneumonia, scattered ground-glass opacity throughout the  lungs bilaterally consistent with atypical pneumonia. No evidence of  acute or chronic pulmonary embolism. ASSESSMENT:  Acute respiratory failure, COVID-19 pneumonia,  hypertension. PLAN:  Get him admitted. Treat him with IV hydration. Pharmacy consult  for IV remdesivir and Actemra.         Edith Weiss MD    D: 08/20/2021 7:26:15       T: 08/20/2021 7:28:43     [email or web address]  Job#: [id number]     Doc#: [id number]    CC:  Zak Tabares MD

## 2021-08-20 NOTE — PROGRESS NOTES
Patient Active Problem List   Diagnosis    Pneumonia    Acute respiratory failure with hypoxia ([facility] [address]    COVID-19    Essential hypertension   H&P dictated

## 2021-08-21 ENCOUNTER — APPOINTMENT (OUTPATIENT)
Dept: GENERAL RADIOLOGY | Age: 71
DRG: 177 | End: 2021-08-21
Payer: MEDICARE

## 2021-08-21 LAB
ANION GAP SERPL CALCULATED.3IONS-SCNC: 12 MMOL/L (ref 3–16)
BUN BLDV-MCNC: 29 MG/DL (ref 7–20)
C-REACTIVE PROTEIN: 52.2 MG/L (ref 0–5.1)
CALCIUM SERPL-MCNC: 8.5 MG/DL (ref 8.3–10.6)
CHLORIDE BLD-SCNC: 105 MMOL/L (ref 99–110)
CO2: 23 MMOL/L (ref 21–32)
CREAT SERPL-MCNC: 0.9 MG/DL (ref 0.8–1.3)
D DIMER: 769 NG/ML DDU (ref 0–229)
GFR AFRICAN AMERICAN: >60
GFR NON-AFRICAN AMERICAN: >60
GLUCOSE BLD-MCNC: 139 MG/DL (ref 70–99)
POTASSIUM REFLEX MAGNESIUM: 4.2 MMOL/L (ref 3.5–5.1)
PROCALCITONIN: 0.08 NG/ML (ref 0–0.15)
SODIUM BLD-SCNC: 140 MMOL/L (ref 136–145)

## 2021-08-21 PROCEDURE — 6360000002 HC RX W HCPCS: Performed by: INTERNAL MEDICINE

## 2021-08-21 PROCEDURE — 36415 COLL VENOUS BLD VENIPUNCTURE: CPT

## 2021-08-21 PROCEDURE — 99233 SBSQ HOSP IP/OBS HIGH 50: CPT | Performed by: INTERNAL MEDICINE

## 2021-08-21 PROCEDURE — 85379 FIBRIN DEGRADATION QUANT: CPT

## 2021-08-21 PROCEDURE — 84145 PROCALCITONIN (PCT): CPT

## 2021-08-21 PROCEDURE — 1200000000 HC SEMI PRIVATE

## 2021-08-21 PROCEDURE — 80048 BASIC METABOLIC PNL TOTAL CA: CPT

## 2021-08-21 PROCEDURE — 2580000003 HC RX 258: Performed by: INTERNAL MEDICINE

## 2021-08-21 PROCEDURE — 86140 C-REACTIVE PROTEIN: CPT

## 2021-08-21 PROCEDURE — 71045 X-RAY EXAM CHEST 1 VIEW: CPT

## 2021-08-21 PROCEDURE — 2500000003 HC RX 250 WO HCPCS: Performed by: INTERNAL MEDICINE

## 2021-08-21 RX ADMIN — REMDESIVIR 100 MG: 100 INJECTION, POWDER, LYOPHILIZED, FOR SOLUTION INTRAVENOUS at 11:33

## 2021-08-21 RX ADMIN — MUPIROCIN: 20 OINTMENT TOPICAL at 11:35

## 2021-08-21 RX ADMIN — MUPIROCIN: 20 OINTMENT TOPICAL at 20:33

## 2021-08-21 RX ADMIN — DEXAMETHASONE SODIUM PHOSPHATE 20 MG: 4 INJECTION, SOLUTION INTRA-ARTICULAR; INTRALESIONAL; INTRAMUSCULAR; INTRAVENOUS; SOFT TISSUE at 08:47

## 2021-08-21 RX ADMIN — ENOXAPARIN SODIUM 100 MG: 100 INJECTION SUBCUTANEOUS at 14:57

## 2021-08-21 ASSESSMENT — PAIN SCALES - GENERAL
PAINLEVEL_OUTOF10: 0

## 2021-08-21 NOTE — PROGRESS NOTES
Pt c/o SOB while ambulating to restroom o2 increased to 5L nc. Pt up in chair eating dinner no respiratory distress at this time.  Call light in reach

## 2021-08-21 NOTE — PROGRESS NOTES
Department of Internal Medicine  General Internal Medicine   Progress Note      SUBJECTIVE: moderate dyspnea , some what lethargic , responds to commands appropriately     History obtained from chart review and the patient  General ROS: positive for  - fatigue and malaise  negative for - chills, fever or night sweats  Psychological ROS: negative  Ophthalmic ROS: negative  Respiratory ROS: positive for - cough, shortness of breath, tachypnea and wheezing  negative for - hemoptysis, stridor or wheezing  Cardiovascular ROS: positive for - dyspnea on exertion and shortness of breath  negative for - chest pain or palpitations  Gastrointestinal ROS: no abdominal pain, change in bowel habits, or black or bloody stools  Genito-Urinary ROS: no dysuria, trouble voiding, or hematuria  Musculoskeletal ROS: negative  Neurological ROS: no TIA or stroke symptoms  Dermatological ROS: negative    OBJECTIVE      Medications      Current Facility-Administered Medications: enoxaparin (LOVENOX) injection 100 mg, 1 mg/kg, Subcutaneous, BID  [COMPLETED] remdesivir 200 mg in sodium chloride 0.9 % 250 mL IVPB, 200 mg, Intravenous, Once **FOLLOWED BY** remdesivir 100 mg in sodium chloride 0.9 % 250 mL IVPB, 100 mg, Intravenous, Q24H  0.9 % sodium chloride bolus, 30 mL, Intravenous, PRN  dexamethasone (DECADRON) 20 mg in sodium chloride 0.9 % IVPB, 20 mg, Intravenous, Daily  mupirocin (BACTROBAN) 2 % ointment, , Topical, BID  ipratropium-albuterol (DUONEB) nebulizer solution 1 ampule, 1 ampule, Inhalation, Q4H PRN  0.9 % sodium chloride infusion, , Intravenous, Continuous    Physical      Vitals: BP (!) 151/56   Pulse 71   Temp 97.9 °F (36.6 °C) (Oral)   Resp 18   Ht 5' 10\" (1.778 m)   Wt 220 lb (99.8 kg)   SpO2 91%   BMI 31.57 kg/m²   Temp: Temp: 97.9 °F (36.6 °C)  Max: Temp  Av.1 °F (36.7 °C)  Min: 97.9 °F (36.6 °C)  Max: 98.3 °F (36.8 °C)  Respiration range:  Resp  Av  Min: 18  Max: 18  Pulse Range:  Pulse  Av.1  Min: 10.1 %    Monocytes % 4.8 %    Eosinophils % 0.0 %    Basophils % 0.5 %    Neutrophils Absolute 6.0 1.7 - 7.7 K/uL    Lymphocytes Absolute 0.7 (L) 1.0 - 5.1 K/uL    Monocytes Absolute 0.3 0.0 - 1.3 K/uL    Eosinophils Absolute 0.0 0.0 - 0.6 K/uL    Basophils Absolute 0.0 0.0 - 0.2 K/uL   Comprehensive Metabolic Panel w/ Reflex to MG    Collection Time: 08/19/21 12:31 PM   Result Value Ref Range    Sodium 135 (L) 136 - 145 mmol/L    Potassium reflex Magnesium 4.0 3.5 - 5.1 mmol/L    Chloride 99 99 - 110 mmol/L    CO2 21 21 - 32 mmol/L    Anion Gap 15 3 - 16    Glucose 123 (H) 70 - 99 mg/dL    BUN 22 (H) 7 - 20 mg/dL    CREATININE 1.1 0.8 - 1.3 mg/dL    GFR Non-African American >60 >60    GFR African American >60 >60    Calcium 8.6 8.3 - 10.6 mg/dL    Total Protein 6.6 6.4 - 8.2 g/dL    Albumin 3.3 (L) 3.4 - 5.0 g/dL    Albumin/Globulin Ratio 1.0 (L) 1.1 - 2.2    Total Bilirubin 0.8 0.0 - 1.0 mg/dL    Alkaline Phosphatase 47 40 - 129 U/L    ALT 34 10 - 40 U/L    AST 49 (H) 15 - 37 U/L    Globulin 3.3 g/dL   Troponin    Collection Time: 08/19/21 12:31 PM   Result Value Ref Range    Troponin <0.01 <0.01 ng/mL   Brain Natriuretic Peptide    Collection Time: 08/19/21 12:31 PM   Result Value Ref Range    Pro- (H) 0 - 124 pg/mL   CK    Collection Time: 08/19/21 12:31 PM   Result Value Ref Range    Total  (H) 39 - 308 U/L   Lactate dehydrogenase    Collection Time: 08/19/21 12:31 PM   Result Value Ref Range     (H) 100 - 190 U/L   Ferritin    Collection Time: 08/19/21 12:31 PM   Result Value Ref Range    Ferritin 3,043.0 (H) 30.0 - 400.0 ng/mL   Procalcitonin    Collection Time: 08/19/21 12:31 PM   Result Value Ref Range    Procalcitonin 0.11 0.00 - 0.15 ng/mL   Lactic acid, plasma    Collection Time: 08/19/21 12:31 PM   Result Value Ref Range    Lactic Acid 1.9 0.4 - 2.0 mmol/L   Culture, Blood 1    Collection Time: 08/19/21 12:31 PM    Specimen: Blood   Result Value Ref Range    Blood Culture, Routine No Growth to date. Any change in status will be called. D-dimer, quantitative    Collection Time: 08/19/21 12:31 PM   Result Value Ref Range    D-Dimer, Quant 1938 (H) 0 - 229 ng/mL DDU   COVID-19    Collection Time: 08/19/21 12:31 PM   Result Value Ref Range    SARS-CoV-2 Detected (A) Not detected   Culture, Blood 2    Collection Time: 08/19/21  1:05 PM    Specimen: Blood   Result Value Ref Range    Culture, Blood 2       No Growth to date. Any change in status will be called.    CBC    Collection Time: 08/20/21  6:36 AM   Result Value Ref Range    WBC 6.7 4.0 - 11.0 K/uL    RBC 4.97 4.20 - 5.90 M/uL    Hemoglobin 14.6 13.5 - 17.5 g/dL    Hematocrit 43.0 40.5 - 52.5 %    MCV 86.4 80.0 - 100.0 fL    MCH 29.4 26.0 - 34.0 pg    MCHC 34.0 31.0 - 36.0 g/dL    RDW 14.3 12.4 - 15.4 %    Platelets 462 588 - 234 K/uL    MPV 8.2 5.0 - 10.5 fL   Comprehensive Metabolic Panel    Collection Time: 08/20/21  6:36 AM   Result Value Ref Range    Sodium 137 136 - 145 mmol/L    Potassium 4.3 3.5 - 5.1 mmol/L    Chloride 101 99 - 110 mmol/L    CO2 25 21 - 32 mmol/L    Anion Gap 11 3 - 16    Glucose 152 (H) 70 - 99 mg/dL    BUN 25 (H) 7 - 20 mg/dL    CREATININE 0.9 0.8 - 1.3 mg/dL    GFR Non-African American >60 >60    GFR African American >60 >60    Calcium 9.0 8.3 - 10.6 mg/dL    Total Protein 6.4 6.4 - 8.2 g/dL    Albumin 3.1 (L) 3.4 - 5.0 g/dL    Albumin/Globulin Ratio 0.9 (L) 1.1 - 2.2    Total Bilirubin 0.5 0.0 - 1.0 mg/dL    Alkaline Phosphatase 49 40 - 129 U/L    ALT 32 10 - 40 U/L    AST 39 (H) 15 - 37 U/L    Globulin 3.3 g/dL   C-reactive protein    Collection Time: 08/20/21  6:36 AM   Result Value Ref Range    .7 (H) 0.0 - 5.1 mg/L   Blood gas, arterial    Collection Time: 08/20/21  6:30 PM   Result Value Ref Range    pH, Arterial 7.461 (H) 7.350 - 7.450    pCO2, Arterial 30.5 (L) 35.0 - 45.0 mmHg    pO2, Arterial 64.7 (L) 75.0 - 108.0 mmHg    HCO3, Arterial 21.7 21.0 - 29.0 mmol/L    Base Excess, Arterial -1.0 -3.0 - 3.0 mmol/L    Hemoglobin, Art, Extended 15.2 13.5 - 17.5 g/dL    O2 Sat, Arterial 93.2 >92 %    Carboxyhgb, Arterial 0.9 0.0 - 1.5 %    Methemoglobin, Arterial 0.2 <1.5 %    TCO2, Arterial 50.7 Not Established mmol/L    O2 Therapy Unknown    D-dimer, quantitative    Collection Time: 08/21/21  6:18 AM   Result Value Ref Range    D-Dimer, Quant 769 (H) 0 - 229 ng/mL DDU   Basic Metabolic Panel w/ Reflex to MG    Collection Time: 08/21/21  6:18 AM   Result Value Ref Range    Sodium 140 136 - 145 mmol/L    Potassium reflex Magnesium 4.2 3.5 - 5.1 mmol/L    Chloride 105 99 - 110 mmol/L    CO2 23 21 - 32 mmol/L    Anion Gap 12 3 - 16    Glucose 139 (H) 70 - 99 mg/dL    BUN 29 (H) 7 - 20 mg/dL    CREATININE 0.9 0.8 - 1.3 mg/dL    GFR Non-African American >60 >60    GFR African American >60 >60    Calcium 8.5 8.3 - 10.6 mg/dL       ASSESSMENT AND PLAN     Active Problems:    Pneumonia    Acute respiratory failure with hypoxia (HCC)    COVID-19    Essential hypertension  Resolved Problems:    * No resolved hospital problems.  *    Titrate o2    DVT prophylaxis    Actemra due to rising o2 above 4 l    ct IV Decadron and remdesivir    Nebulizer treatment    Pulmonary consult    ABG

## 2021-08-21 NOTE — PROGRESS NOTES
PULMONARY AND CRITICAL CARE MEDICINE PROGRESS NOTE      SUBJECTIVE: Patient has been weaned down to 4 L nasal cannula. States improvement in shortness of breath. Denies any cough. Chest x-ray from today continues to show bilateral infiltrates. REVIEW OF SYSTEMS   CONSTITUTIONAL SYMPTOMS: The patient denies fever, fatigue, night sweats, weight loss or weight gain. HEENT: No vision changes. No tinnitus, Denies sinus pain. No hoarseness, or dysphagia. CARDIOVASCULAR: Denies chest pain, palpitation, syncope. RESPIRATORY: See above. GASTROINTESTINAL: Denies nausea, abdominal pain or change in bowel function. SKIN: No rashes or itching. MUSCULOSKELETAL: Denies weakness or bone pain. NEUROLOGICAL: No headaches or seizures. MEDICATIONS:      enoxaparin  1 mg/kg Subcutaneous BID    remdesivir IVPB  100 mg Intravenous Q24H    dexamethasone  20 mg Intravenous Daily    mupirocin   Topical BID      sodium chloride 100 mL/hr at 08/20/21 2353     sodium chloride, ipratropium-albuterol     ALLERGIES:   Allergies as of 08/19/2021    (No Known Allergies)        OBJECTIVE:   height is 5' 10\" (1.778 m) and weight is 220 lb (99.8 kg). His oral temperature is 97.6 °F (36.4 °C). His blood pressure is 133/60 and his pulse is 50. His respiration is 16 and oxygen saturation is 94%. No intake/output data recorded. PHYSICAL EXAM:  CONSTITUTIONAL: He is a 69y.o.-year-old who appears his stated age. He is alert and oriented x 3 and in no acute distress. CARDIOVASCULAR: S1 S2 RRR. Without murmer  RESPIRATORY & CHEST: Lungs are clear to auscultation and percussion. No wheezing, no crackles. Good air movement  GASTROINTESTINAL & ABDOMEN: Soft, nontender, positive bowel sounds in all quadrants, non-distended, without hepatosplenomegaly. GENITOURINARY: Deferred. MUSCULOSKELETAL: No tenderness to palpation of the axial skeleton. There is no clubbing. No cyanosis. No edema of the lower extremities.    SKIN OF BODY: No rash or jaundice. PSYCHIATRIC EVALUATION: Normal affect. Patient answers questions appropriately. HEMATOLOGIC/LYMPHATIC/ IMMUNOLOGIC: No palpable lymphadenopathy. NEUROLOGIC: Alert and oriented x 3. Groslly non-focal. Motor strength is 5+/5 in all muscle groups. The patient has a normal sensorium globally. LABS:   Lab Results   Component Value Date    WBC 6.7 08/20/2021    HGB 14.6 08/20/2021    HCT 43.0 08/20/2021     08/20/2021    ALT 32 08/20/2021    AST 39 (H) 08/20/2021     08/21/2021    K 4.2 08/21/2021     08/21/2021    CREATININE 0.9 08/21/2021    BUN 29 (H) 08/21/2021    CO2 23 08/21/2021    PSA 2.18 11/18/2010       Lab Results   Component Value Date    GLUCOSE 139 (H) 08/21/2021    CALCIUM 8.5 08/21/2021     08/21/2021    K 4.2 08/21/2021    CO2 23 08/21/2021     08/21/2021    BUN 29 (H) 08/21/2021    CREATININE 0.9 08/21/2021       Lab Results   Component Value Date    GLUCOSE 139 (H) 08/21/2021    CALCIUM 8.5 08/21/2021     08/21/2021    K 4.2 08/21/2021    CO2 23 08/21/2021     08/21/2021    BUN 29 (H) 08/21/2021    CREATININE 0.9 08/21/2021     Recent Labs     08/20/21  1830   PHART 7.461*   SDW2ZKF 30.5*   PO2ART 64.7*   LFW9IIO 21.7   [id number] 93.2   BEART -1.0       IMAGING:   I reviewed the chest x-ray from 8/21/2021 and my interpretation is as follows. Bilateral unchanged infiltrates noted      IMPRESSION:   Acute hypoxic respiratory failure  Pneumonia due to COVID-19  Hypercoagulable state associated with COVID-19    RECOMMENDATION:   Patient has acute hypoxic respiratory secondary COVID-19 pneumonia. Sharri has been weaned down to 4 L nasal cannula and saturating well. On remdesivir, day 2/5. Also received 1 dose of Actemra on 8/20/2021. CRP has decreased to 52. Remains on Decadron 20 mg IV daily. Was noted to have elevated D-dimer of 1938. Was initiated on therapeutic Lovenox. D-dimer decreased to 769.   Continue therapeutic Lovenox. Encourage prone position in the bed while resting. Out of bed in chair. Pulmonary will sign off. Please call pulmonary if oxygen requirements worsen. Emily Huang MD  Pulmonary Critical Care and Sleep Medicine  8/21/2021, 3:06 PM    This note was completed using dragon medical speech recognition software. Grammatical errors, random word insertions, pronoun errors and incomplete sentences are occasional consequences of this technology due to software limitations. If there are questions or concerns about the content of this note of information contained within the body of this dictation they should be addressed with the provider for clarification.

## 2021-08-21 NOTE — PROGRESS NOTES
2040: Shift assessment complete. VSS. Scheduled medications given. Pt sitting up in chair eating dinner. Denies pain, any further needs. Call light within reach.

## 2021-08-21 NOTE — PROGRESS NOTES
Pt encouraged to lay prone compliant o2 95% 4L nc.  Pt denies any pain resting eyes closed call light in reach ate 100% breakfast.

## 2021-08-22 LAB
ANION GAP SERPL CALCULATED.3IONS-SCNC: 11 MMOL/L (ref 3–16)
BUN BLDV-MCNC: 29 MG/DL (ref 7–20)
C-REACTIVE PROTEIN: 29.3 MG/L (ref 0–5.1)
CALCIUM SERPL-MCNC: 8.8 MG/DL (ref 8.3–10.6)
CHLORIDE BLD-SCNC: 107 MMOL/L (ref 99–110)
CO2: 23 MMOL/L (ref 21–32)
CREAT SERPL-MCNC: 1 MG/DL (ref 0.8–1.3)
D DIMER: 776 NG/ML DDU (ref 0–229)
GFR AFRICAN AMERICAN: >60
GFR NON-AFRICAN AMERICAN: >60
GLUCOSE BLD-MCNC: 156 MG/DL (ref 70–99)
POTASSIUM REFLEX MAGNESIUM: 4.5 MMOL/L (ref 3.5–5.1)
PROCALCITONIN: 0.07 NG/ML (ref 0–0.15)
SODIUM BLD-SCNC: 141 MMOL/L (ref 136–145)

## 2021-08-22 PROCEDURE — 36415 COLL VENOUS BLD VENIPUNCTURE: CPT

## 2021-08-22 PROCEDURE — 86140 C-REACTIVE PROTEIN: CPT

## 2021-08-22 PROCEDURE — 84145 PROCALCITONIN (PCT): CPT

## 2021-08-22 PROCEDURE — 2580000003 HC RX 258: Performed by: INTERNAL MEDICINE

## 2021-08-22 PROCEDURE — 80048 BASIC METABOLIC PNL TOTAL CA: CPT

## 2021-08-22 PROCEDURE — 6360000002 HC RX W HCPCS: Performed by: INTERNAL MEDICINE

## 2021-08-22 PROCEDURE — 2500000003 HC RX 250 WO HCPCS: Performed by: INTERNAL MEDICINE

## 2021-08-22 PROCEDURE — 1200000000 HC SEMI PRIVATE

## 2021-08-22 PROCEDURE — 85379 FIBRIN DEGRADATION QUANT: CPT

## 2021-08-22 RX ADMIN — ENOXAPARIN SODIUM 100 MG: 100 INJECTION SUBCUTANEOUS at 00:08

## 2021-08-22 RX ADMIN — ENOXAPARIN SODIUM 100 MG: 100 INJECTION SUBCUTANEOUS at 09:46

## 2021-08-22 RX ADMIN — ENOXAPARIN SODIUM 100 MG: 100 INJECTION SUBCUTANEOUS at 21:35

## 2021-08-22 RX ADMIN — REMDESIVIR 100 MG: 100 INJECTION, POWDER, LYOPHILIZED, FOR SOLUTION INTRAVENOUS at 11:43

## 2021-08-22 RX ADMIN — MUPIROCIN: 20 OINTMENT TOPICAL at 09:46

## 2021-08-22 RX ADMIN — MUPIROCIN: 20 OINTMENT TOPICAL at 21:36

## 2021-08-22 RX ADMIN — DEXAMETHASONE SODIUM PHOSPHATE 20 MG: 4 INJECTION, SOLUTION INTRA-ARTICULAR; INTRALESIONAL; INTRAMUSCULAR; INTRAVENOUS; SOFT TISSUE at 09:49

## 2021-08-22 ASSESSMENT — PAIN SCALES - GENERAL
PAINLEVEL_OUTOF10: 0

## 2021-08-22 NOTE — PROGRESS NOTES
Pt up to restroom desat to 70% upon exertion. Pt returned to bed laying prone sat 92% within couple minutes. Pt denies any pain c/o chills remains afebrile stool was formed pt continent.  Resting call light in reach

## 2021-08-22 NOTE — PROGRESS NOTES
Shift assessment complete. VSS. Scheduled medications given. Spoke with pts wife and niece on the telephone, updated on pts status, all questions answered at this time.

## 2021-08-22 NOTE — PROGRESS NOTES
Department of Internal Medicine  General Internal Medicine   Progress Note      SUBJECTIVE: both dyspneic and lethargic , PO intake poor   History obtained from chart review and the patient  General ROS: positive for  - fatigue and malaise  negative for - chills, fever or night sweats  Psychological ROS: negative  Ophthalmic ROS: negative  Respiratory ROS: positive for - cough, shortness of breath, tachypnea and wheezing  negative for - hemoptysis, stridor or wheezing  Cardiovascular ROS: positive for - dyspnea on exertion and shortness of breath  negative for - chest pain or palpitations  Gastrointestinal ROS: no abdominal pain, change in bowel habits, or black or bloody stools  Genito-Urinary ROS: no dysuria, trouble voiding, or hematuria  Musculoskeletal ROS: negative  Neurological ROS: no TIA or stroke symptoms  Dermatological ROS: negative    OBJECTIVE      Medications      Current Facility-Administered Medications: enoxaparin (LOVENOX) injection 100 mg, 1 mg/kg, Subcutaneous, BID  [COMPLETED] remdesivir 200 mg in sodium chloride 0.9 % 250 mL IVPB, 200 mg, Intravenous, Once **FOLLOWED BY** remdesivir 100 mg in sodium chloride 0.9 % 250 mL IVPB, 100 mg, Intravenous, Q24H  0.9 % sodium chloride bolus, 30 mL, Intravenous, PRN  dexamethasone (DECADRON) 20 mg in sodium chloride 0.9 % IVPB, 20 mg, Intravenous, Daily  mupirocin (BACTROBAN) 2 % ointment, , Topical, BID  ipratropium-albuterol (DUONEB) nebulizer solution 1 ampule, 1 ampule, Inhalation, Q4H PRN  0.9 % sodium chloride infusion, , Intravenous, Continuous    Physical      Vitals: /67   Pulse 63   Temp 97.9 °F (36.6 °C) (Oral)   Resp 18   Ht 5' 10\" (1.778 m)   Wt 220 lb (99.8 kg)   SpO2 93%   BMI 31.57 kg/m²   Temp: Temp: 97.9 °F (36.6 °C)  Max: Temp  Av.9 °F (36.6 °C)  Min: 97.6 °F (36.4 °C)  Max: 98.2 °F (36.8 °C)  Respiration range:  Resp  Av.8  Min: 16  Max: 20  Pulse Range:  Pulse  Av.1  Min: 50  Max: 77  Blood pressure range: Systolic (86BPG), OVH:480 , Min:124 , OHQ:040   , Diastolic (74RFD), ZTU:65, Min:60, Max:82    SpO2  Av.6 %  Min: 88 %  Max: 96 %    Intake/Output Summary (Last 24 hours) at 2021 1028  Last data filed at 2021 1840  Gross per 24 hour   Intake 360 ml   Output --   Net 360 ml       Vent settings:  Pulse  Av.6  Min: 50  Max: 77  Resp  Av.3  Min: 16  Max: 30  SpO2  Av.8 %  Min: 86 %  Max: 99 %    CONSTITUTIONAL:  fatigued, alert, cooperative, mild distress, appears stated age and normal weight  EYES:  Unremarkable   NECK:  Mild JVD  and supple, symmetrical, trachea midline  BACK:  symmetric and no curvature  LUNGS:  tachypneic, Severe respiratory distress, moderate air exchange, no retractions and crackles diffuse, rhonchi diffuse, wheeze diffuse  CARDIOVASCULAR:  normal apical pulses, regular rate and rhythm, normal S1 and S2, no S3 and no S4  ABDOMEN:  Soft non tender BS =   MUSCULOSKELETAL:  Trace edema   NEUROLOGIC:  Grossly intact   SKIN:  Warm and dry  and no bruising or bleeding    Data      Recent Results (from the past 96 hour(s))   EKG 12 Lead    Collection Time: 21 12:23 PM   Result Value Ref Range    Ventricular Rate 72 BPM    Atrial Rate 72 BPM    P-R Interval 148 ms    QRS Duration 74 ms    Q-T Interval 390 ms    QTc Calculation (Bazett) 427 ms    P Axis 41 degrees    R Axis 55 degrees    T Axis 45 degrees    Diagnosis       Normal sinus rhythmNormal ECGConfirmed by SUGAR MUNGUIA MD ([id number]) on 2021 11:52:11 AM   CBC Auto Differential    Collection Time: 21 12:31 PM   Result Value Ref Range    WBC 7.1 4.0 - 11.0 K/uL    RBC 4.91 4.20 - 5.90 M/uL    Hemoglobin 14.5 13.5 - 17.5 g/dL    Hematocrit 42.6 40.5 - 52.5 %    MCV 86.9 80.0 - 100.0 fL    MCH 29.6 26.0 - 34.0 pg    MCHC 34.1 31.0 - 36.0 g/dL    RDW 14.2 12.4 - 15.4 %    Platelets 253 193 - 769 K/uL    MPV 7.7 5.0 - 10.5 fL    Neutrophils % 84.6 %    Lymphocytes % 10.1 %    Monocytes % 4.8 %    Eosinophils % 0.0 %    Basophils % 0.5 %    Neutrophils Absolute 6.0 1.7 - 7.7 K/uL    Lymphocytes Absolute 0.7 (L) 1.0 - 5.1 K/uL    Monocytes Absolute 0.3 0.0 - 1.3 K/uL    Eosinophils Absolute 0.0 0.0 - 0.6 K/uL    Basophils Absolute 0.0 0.0 - 0.2 K/uL   Comprehensive Metabolic Panel w/ Reflex to MG    Collection Time: 08/19/21 12:31 PM   Result Value Ref Range    Sodium 135 (L) 136 - 145 mmol/L    Potassium reflex Magnesium 4.0 3.5 - 5.1 mmol/L    Chloride 99 99 - 110 mmol/L    CO2 21 21 - 32 mmol/L    Anion Gap 15 3 - 16    Glucose 123 (H) 70 - 99 mg/dL    BUN 22 (H) 7 - 20 mg/dL    CREATININE 1.1 0.8 - 1.3 mg/dL    GFR Non-African American >60 >60    GFR African American >60 >60    Calcium 8.6 8.3 - 10.6 mg/dL    Total Protein 6.6 6.4 - 8.2 g/dL    Albumin 3.3 (L) 3.4 - 5.0 g/dL    Albumin/Globulin Ratio 1.0 (L) 1.1 - 2.2    Total Bilirubin 0.8 0.0 - 1.0 mg/dL    Alkaline Phosphatase 47 40 - 129 U/L    ALT 34 10 - 40 U/L    AST 49 (H) 15 - 37 U/L    Globulin 3.3 g/dL   Troponin    Collection Time: 08/19/21 12:31 PM   Result Value Ref Range    Troponin <0.01 <0.01 ng/mL   Brain Natriuretic Peptide    Collection Time: 08/19/21 12:31 PM   Result Value Ref Range    Pro- (H) 0 - 124 pg/mL   CK    Collection Time: 08/19/21 12:31 PM   Result Value Ref Range    Total  (H) 39 - 308 U/L   Lactate dehydrogenase    Collection Time: 08/19/21 12:31 PM   Result Value Ref Range     (H) 100 - 190 U/L   Ferritin    Collection Time: 08/19/21 12:31 PM   Result Value Ref Range    Ferritin 3,043.0 (H) 30.0 - 400.0 ng/mL   Procalcitonin    Collection Time: 08/19/21 12:31 PM   Result Value Ref Range    Procalcitonin 0.11 0.00 - 0.15 ng/mL   Lactic acid, plasma    Collection Time: 08/19/21 12:31 PM   Result Value Ref Range    Lactic Acid 1.9 0.4 - 2.0 mmol/L   Culture, Blood 1    Collection Time: 08/19/21 12:31 PM    Specimen: Blood   Result Value Ref Range    Blood Culture, Routine       No Growth to date.   Any change in status Extended 15.2 13.5 - 17.5 g/dL    O2 Sat, Arterial 93.2 >92 %    Carboxyhgb, Arterial 0.9 0.0 - 1.5 %    Methemoglobin, Arterial 0.2 <1.5 %    TCO2, Arterial 50.7 Not Established mmol/L    O2 Therapy Unknown    Procalcitonin    Collection Time: 08/21/21  6:18 AM   Result Value Ref Range    Procalcitonin 0.08 0.00 - 0.15 ng/mL   D-dimer, quantitative    Collection Time: 08/21/21  6:18 AM   Result Value Ref Range    D-Dimer, Quant 769 (H) 0 - 229 ng/mL DDU   C-reactive protein    Collection Time: 08/21/21  6:18 AM   Result Value Ref Range    CRP 52.2 (H) 0.0 - 5.1 mg/L   Basic Metabolic Panel w/ Reflex to MG    Collection Time: 08/21/21  6:18 AM   Result Value Ref Range    Sodium 140 136 - 145 mmol/L    Potassium reflex Magnesium 4.2 3.5 - 5.1 mmol/L    Chloride 105 99 - 110 mmol/L    CO2 23 21 - 32 mmol/L    Anion Gap 12 3 - 16    Glucose 139 (H) 70 - 99 mg/dL    BUN 29 (H) 7 - 20 mg/dL    CREATININE 0.9 0.8 - 1.3 mg/dL    GFR Non-African American >60 >60    GFR African American >60 >60    Calcium 8.5 8.3 - 10.6 mg/dL   D-dimer, quantitative    Collection Time: 08/22/21  9:10 AM   Result Value Ref Range    D-Dimer, Quant 776 (H) 0 - 229 ng/mL DDU   Basic Metabolic Panel w/ Reflex to MG    Collection Time: 08/22/21  9:10 AM   Result Value Ref Range    Sodium 141 136 - 145 mmol/L    Potassium reflex Magnesium 4.5 3.5 - 5.1 mmol/L    Chloride 107 99 - 110 mmol/L    CO2 23 21 - 32 mmol/L    Anion Gap 11 3 - 16    Glucose 156 (H) 70 - 99 mg/dL    BUN 29 (H) 7 - 20 mg/dL    CREATININE 1.0 0.8 - 1.3 mg/dL    GFR Non-African American >60 >60    GFR African American >60 >60    Calcium 8.8 8.3 - 10.6 mg/dL       ASSESSMENT AND PLAN     Active Problems:    Pneumonia    Acute respiratory failure with hypoxia (HCC)    COVID-19    Essential hypertension  Resolved Problems:    * No resolved hospital problems.  *     wife updated constantly    ABGs    repat CXR    O2 requirement is on rise and that is a worrisome sign   Pulmonology on board    juditjean-claude got actemra and now on remdesivir and dexamethasone IV , nebulized aerosols and high flow O2

## 2021-08-22 NOTE — PROGRESS NOTES
Pt up to chair ate 100% breakfast sat 88 on 6L pt laying prone at this time 93 5L resting call light in reach denies any pain pleasant affect

## 2021-08-23 LAB
ANION GAP SERPL CALCULATED.3IONS-SCNC: 11 MMOL/L (ref 3–16)
BLOOD CULTURE, ROUTINE: NORMAL
BUN BLDV-MCNC: 28 MG/DL (ref 7–20)
C-REACTIVE PROTEIN: 19 MG/L (ref 0–5.1)
CALCIUM SERPL-MCNC: 8.7 MG/DL (ref 8.3–10.6)
CHLORIDE BLD-SCNC: 106 MMOL/L (ref 99–110)
CO2: 24 MMOL/L (ref 21–32)
CREAT SERPL-MCNC: 0.9 MG/DL (ref 0.8–1.3)
CULTURE, BLOOD 2: NORMAL
D DIMER: 726 NG/ML DDU (ref 0–229)
GFR AFRICAN AMERICAN: >60
GFR NON-AFRICAN AMERICAN: >60
GLUCOSE BLD-MCNC: 162 MG/DL (ref 70–99)
POTASSIUM REFLEX MAGNESIUM: 4.2 MMOL/L (ref 3.5–5.1)
PROCALCITONIN: 0.07 NG/ML (ref 0–0.15)
SODIUM BLD-SCNC: 141 MMOL/L (ref 136–145)

## 2021-08-23 PROCEDURE — 2580000003 HC RX 258: Performed by: INTERNAL MEDICINE

## 2021-08-23 PROCEDURE — 80048 BASIC METABOLIC PNL TOTAL CA: CPT

## 2021-08-23 PROCEDURE — 6370000000 HC RX 637 (ALT 250 FOR IP): Performed by: INTERNAL MEDICINE

## 2021-08-23 PROCEDURE — 6360000002 HC RX W HCPCS: Performed by: INTERNAL MEDICINE

## 2021-08-23 PROCEDURE — 84145 PROCALCITONIN (PCT): CPT

## 2021-08-23 PROCEDURE — 2500000003 HC RX 250 WO HCPCS: Performed by: INTERNAL MEDICINE

## 2021-08-23 PROCEDURE — 94760 N-INVAS EAR/PLS OXIMETRY 1: CPT

## 2021-08-23 PROCEDURE — 1200000000 HC SEMI PRIVATE

## 2021-08-23 PROCEDURE — 85379 FIBRIN DEGRADATION QUANT: CPT

## 2021-08-23 PROCEDURE — 2700000000 HC OXYGEN THERAPY PER DAY

## 2021-08-23 PROCEDURE — 86140 C-REACTIVE PROTEIN: CPT

## 2021-08-23 RX ORDER — AMLODIPINE BESYLATE 5 MG/1
10 TABLET ORAL DAILY
Status: DISCONTINUED | OUTPATIENT
Start: 2021-08-23 | End: 2021-08-31

## 2021-08-23 RX ADMIN — DEXAMETHASONE SODIUM PHOSPHATE 20 MG: 4 INJECTION, SOLUTION INTRA-ARTICULAR; INTRALESIONAL; INTRAMUSCULAR; INTRAVENOUS; SOFT TISSUE at 12:16

## 2021-08-23 RX ADMIN — ENOXAPARIN SODIUM 100 MG: 100 INJECTION SUBCUTANEOUS at 20:01

## 2021-08-23 RX ADMIN — ENOXAPARIN SODIUM 100 MG: 100 INJECTION SUBCUTANEOUS at 10:31

## 2021-08-23 RX ADMIN — REMDESIVIR 100 MG: 100 INJECTION, POWDER, LYOPHILIZED, FOR SOLUTION INTRAVENOUS at 10:28

## 2021-08-23 RX ADMIN — AMLODIPINE BESYLATE 10 MG: 5 TABLET ORAL at 10:31

## 2021-08-23 RX ADMIN — MUPIROCIN: 20 OINTMENT TOPICAL at 10:24

## 2021-08-23 RX ADMIN — AMLODIPINE BESYLATE 10 MG: 5 TABLET ORAL at 01:06

## 2021-08-23 RX ADMIN — MUPIROCIN: 20 OINTMENT TOPICAL at 20:01

## 2021-08-23 ASSESSMENT — PAIN SCALES - GENERAL
PAINLEVEL_OUTOF10: 0

## 2021-08-23 NOTE — PROGRESS NOTES
Department of Internal Medicine  General Internal Medicine   Progress Note      SUBJECTIVE: lethargic and in moderate respiratory distress   History obtained from chart review and the patient  General ROS: positive for  - fatigue and malaise  negative for - chills, fever or night sweats  Psychological ROS: negative  Ophthalmic ROS: negative  Respiratory ROS: positive for - cough, shortness of breath, tachypnea and wheezing  negative for - hemoptysis, stridor or wheezing  Cardiovascular ROS: positive for - dyspnea on exertion and shortness of breath  negative for - chest pain or palpitations  Gastrointestinal ROS: no abdominal pain, change in bowel habits, or black or bloody stools  Genito-Urinary ROS: no dysuria, trouble voiding, or hematuria  Musculoskeletal ROS: negative  Neurological ROS: no TIA or stroke symptoms  Dermatological ROS: negative    OBJECTIVE      Medications      Current Facility-Administered Medications: enoxaparin (LOVENOX) injection 100 mg, 1 mg/kg, Subcutaneous, BID  [COMPLETED] remdesivir 200 mg in sodium chloride 0.9 % 250 mL IVPB, 200 mg, Intravenous, Once **FOLLOWED BY** remdesivir 100 mg in sodium chloride 0.9 % 250 mL IVPB, 100 mg, Intravenous, Q24H  0.9 % sodium chloride bolus, 30 mL, Intravenous, PRN  dexamethasone (DECADRON) 20 mg in sodium chloride 0.9 % IVPB, 20 mg, Intravenous, Daily  mupirocin (BACTROBAN) 2 % ointment, , Topical, BID  ipratropium-albuterol (DUONEB) nebulizer solution 1 ampule, 1 ampule, Inhalation, Q4H PRN    Physical      Vitals: BP (!) 179/96   Pulse 66   Temp 98 °F (36.7 °C) (Oral)   Resp 20   Ht 5' 10\" (1.778 m)   Wt 220 lb (99.8 kg)   SpO2 97%   BMI 31.57 kg/m²   Temp: Temp: 98 °F (36.7 °C)  Max: Temp  Av °F (36.7 °C)  Min: 97.9 °F (36.6 °C)  Max: 98.1 °F (36.7 °C)  Respiration range:  Resp  Av.3  Min: 16  Max: 22  Pulse Range:  Pulse  Av.5  Min: 61  Max: 81  Blood pressure range:  Systolic (82ZEL), QHH:603 , Min:124 , Max:181   , Diastolic (72QRI), ZRE:18, Min:67, Max:96    SpO2  Av.6 %  Min: 70 %  Max: 98 %  No intake or output data in the 24 hours ending 21 0043    Vent settings:  Pulse  Av.3  Min: 50  Max: 81  Resp  Av.3  Min: 16  Max: 30  SpO2  Av.4 %  Min: 70 %  Max: 99 %    CONSTITUTIONAL:  fatigued, alert, cooperative, mild distress, appears stated age and normal weight  EYES:  Unremarkable   NECK:  Mild JVD  and supple, symmetrical, trachea midline  BACK:  symmetric and no curvature  LUNGS:  tachypneic, Severe respiratory distress, moderate air exchange, no retractions and crackles diffuse, rhonchi diffuse, wheeze diffuse  CARDIOVASCULAR:  normal apical pulses, regular rate and rhythm, normal S1 and S2, no S3 and no S4  ABDOMEN:  Soft non tender BS =   MUSCULOSKELETAL:  Trace edema   NEUROLOGIC:  Grossly intact   SKIN:  Warm and dry  and no bruising or bleeding    Data      Recent Results (from the past 96 hour(s))   EKG 12 Lead    Collection Time: 21 12:23 PM   Result Value Ref Range    Ventricular Rate 72 BPM    Atrial Rate 72 BPM    P-R Interval 148 ms    QRS Duration 74 ms    Q-T Interval 390 ms    QTc Calculation (Bazett) 427 ms    P Axis 41 degrees    R Axis 55 degrees    T Axis 45 degrees    Diagnosis       Normal sinus rhythmNormal ECGConfirmed by SUGAR MUNGUIA MD ([id number]) on 2021 11:52:11 AM   CBC Auto Differential    Collection Time: 21 12:31 PM   Result Value Ref Range    WBC 7.1 4.0 - 11.0 K/uL    RBC 4.91 4.20 - 5.90 M/uL    Hemoglobin 14.5 13.5 - 17.5 g/dL    Hematocrit 42.6 40.5 - 52.5 %    MCV 86.9 80.0 - 100.0 fL    MCH 29.6 26.0 - 34.0 pg    MCHC 34.1 31.0 - 36.0 g/dL    RDW 14.2 12.4 - 15.4 %    Platelets 703 787 - 790 K/uL    MPV 7.7 5.0 - 10.5 fL    Neutrophils % 84.6 %    Lymphocytes % 10.1 %    Monocytes % 4.8 %    Eosinophils % 0.0 %    Basophils % 0.5 %    Neutrophils Absolute 6.0 1.7 - 7.7 K/uL    Lymphocytes Absolute 0.7 (L) 1.0 - 5.1 K/uL    Monocytes Absolute 0.3 0.0 - 1.3 K/uL    Eosinophils Absolute 0.0 0.0 - 0.6 K/uL    Basophils Absolute 0.0 0.0 - 0.2 K/uL   Comprehensive Metabolic Panel w/ Reflex to MG    Collection Time: 08/19/21 12:31 PM   Result Value Ref Range    Sodium 135 (L) 136 - 145 mmol/L    Potassium reflex Magnesium 4.0 3.5 - 5.1 mmol/L    Chloride 99 99 - 110 mmol/L    CO2 21 21 - 32 mmol/L    Anion Gap 15 3 - 16    Glucose 123 (H) 70 - 99 mg/dL    BUN 22 (H) 7 - 20 mg/dL    CREATININE 1.1 0.8 - 1.3 mg/dL    GFR Non-African American >60 >60    GFR African American >60 >60    Calcium 8.6 8.3 - 10.6 mg/dL    Total Protein 6.6 6.4 - 8.2 g/dL    Albumin 3.3 (L) 3.4 - 5.0 g/dL    Albumin/Globulin Ratio 1.0 (L) 1.1 - 2.2    Total Bilirubin 0.8 0.0 - 1.0 mg/dL    Alkaline Phosphatase 47 40 - 129 U/L    ALT 34 10 - 40 U/L    AST 49 (H) 15 - 37 U/L    Globulin 3.3 g/dL   Troponin    Collection Time: 08/19/21 12:31 PM   Result Value Ref Range    Troponin <0.01 <0.01 ng/mL   Brain Natriuretic Peptide    Collection Time: 08/19/21 12:31 PM   Result Value Ref Range    Pro- (H) 0 - 124 pg/mL   CK    Collection Time: 08/19/21 12:31 PM   Result Value Ref Range    Total  (H) 39 - 308 U/L   Lactate dehydrogenase    Collection Time: 08/19/21 12:31 PM   Result Value Ref Range     (H) 100 - 190 U/L   Ferritin    Collection Time: 08/19/21 12:31 PM   Result Value Ref Range    Ferritin 3,043.0 (H) 30.0 - 400.0 ng/mL   Procalcitonin    Collection Time: 08/19/21 12:31 PM   Result Value Ref Range    Procalcitonin 0.11 0.00 - 0.15 ng/mL   Lactic acid, plasma    Collection Time: 08/19/21 12:31 PM   Result Value Ref Range    Lactic Acid 1.9 0.4 - 2.0 mmol/L   Culture, Blood 1    Collection Time: 08/19/21 12:31 PM    Specimen: Blood   Result Value Ref Range    Blood Culture, Routine       No Growth to date. Any change in status will be called.    D-dimer, quantitative    Collection Time: 08/19/21 12:31 PM   Result Value Ref Range    D-Dimer, Quant 1938 (H) 0 - 229 ng/mL DDU   COVID-19    Collection Time: 08/19/21 12:31 PM   Result Value Ref Range    SARS-CoV-2 Detected (A) Not detected   Culture, Blood 2    Collection Time: 08/19/21  1:05 PM    Specimen: Blood   Result Value Ref Range    Culture, Blood 2       No Growth to date. Any change in status will be called.    CBC    Collection Time: 08/20/21  6:36 AM   Result Value Ref Range    WBC 6.7 4.0 - 11.0 K/uL    RBC 4.97 4.20 - 5.90 M/uL    Hemoglobin 14.6 13.5 - 17.5 g/dL    Hematocrit 43.0 40.5 - 52.5 %    MCV 86.4 80.0 - 100.0 fL    MCH 29.4 26.0 - 34.0 pg    MCHC 34.0 31.0 - 36.0 g/dL    RDW 14.3 12.4 - 15.4 %    Platelets 885 133 - 191 K/uL    MPV 8.2 5.0 - 10.5 fL   Comprehensive Metabolic Panel    Collection Time: 08/20/21  6:36 AM   Result Value Ref Range    Sodium 137 136 - 145 mmol/L    Potassium 4.3 3.5 - 5.1 mmol/L    Chloride 101 99 - 110 mmol/L    CO2 25 21 - 32 mmol/L    Anion Gap 11 3 - 16    Glucose 152 (H) 70 - 99 mg/dL    BUN 25 (H) 7 - 20 mg/dL    CREATININE 0.9 0.8 - 1.3 mg/dL    GFR Non-African American >60 >60    GFR African American >60 >60    Calcium 9.0 8.3 - 10.6 mg/dL    Total Protein 6.4 6.4 - 8.2 g/dL    Albumin 3.1 (L) 3.4 - 5.0 g/dL    Albumin/Globulin Ratio 0.9 (L) 1.1 - 2.2    Total Bilirubin 0.5 0.0 - 1.0 mg/dL    Alkaline Phosphatase 49 40 - 129 U/L    ALT 32 10 - 40 U/L    AST 39 (H) 15 - 37 U/L    Globulin 3.3 g/dL   C-reactive protein    Collection Time: 08/20/21  6:36 AM   Result Value Ref Range    .7 (H) 0.0 - 5.1 mg/L   Blood gas, arterial    Collection Time: 08/20/21  6:30 PM   Result Value Ref Range    pH, Arterial 7.461 (H) 7.350 - 7.450    pCO2, Arterial 30.5 (L) 35.0 - 45.0 mmHg    pO2, Arterial 64.7 (L) 75.0 - 108.0 mmHg    HCO3, Arterial 21.7 21.0 - 29.0 mmol/L    Base Excess, Arterial -1.0 -3.0 - 3.0 mmol/L    Hemoglobin, Art, Extended 15.2 13.5 - 17.5 g/dL    O2 Sat, Arterial 93.2 >92 %    Carboxyhgb, Arterial 0.9 0.0 - 1.5 %    Methemoglobin, Arterial 0.2 <1.5 %    TCO2, Arterial 50.7 Not Established mmol/L    O2 Therapy Unknown    Procalcitonin    Collection Time: 08/21/21  6:18 AM   Result Value Ref Range    Procalcitonin 0.08 0.00 - 0.15 ng/mL   D-dimer, quantitative    Collection Time: 08/21/21  6:18 AM   Result Value Ref Range    D-Dimer, Quant 769 (H) 0 - 229 ng/mL DDU   C-reactive protein    Collection Time: 08/21/21  6:18 AM   Result Value Ref Range    CRP 52.2 (H) 0.0 - 5.1 mg/L   Basic Metabolic Panel w/ Reflex to MG    Collection Time: 08/21/21  6:18 AM   Result Value Ref Range    Sodium 140 136 - 145 mmol/L    Potassium reflex Magnesium 4.2 3.5 - 5.1 mmol/L    Chloride 105 99 - 110 mmol/L    CO2 23 21 - 32 mmol/L    Anion Gap 12 3 - 16    Glucose 139 (H) 70 - 99 mg/dL    BUN 29 (H) 7 - 20 mg/dL    CREATININE 0.9 0.8 - 1.3 mg/dL    GFR Non-African American >60 >60    GFR African American >60 >60    Calcium 8.5 8.3 - 10.6 mg/dL   Procalcitonin    Collection Time: 08/22/21  9:10 AM   Result Value Ref Range    Procalcitonin 0.07 0.00 - 0.15 ng/mL   D-dimer, quantitative    Collection Time: 08/22/21  9:10 AM   Result Value Ref Range    D-Dimer, Quant 776 (H) 0 - 229 ng/mL DDU   C-reactive protein    Collection Time: 08/22/21  9:10 AM   Result Value Ref Range    CRP 29.3 (H) 0.0 - 5.1 mg/L   Basic Metabolic Panel w/ Reflex to MG    Collection Time: 08/22/21  9:10 AM   Result Value Ref Range    Sodium 141 136 - 145 mmol/L    Potassium reflex Magnesium 4.5 3.5 - 5.1 mmol/L    Chloride 107 99 - 110 mmol/L    CO2 23 21 - 32 mmol/L    Anion Gap 11 3 - 16    Glucose 156 (H) 70 - 99 mg/dL    BUN 29 (H) 7 - 20 mg/dL    CREATININE 1.0 0.8 - 1.3 mg/dL    GFR Non-African American >60 >60    GFR African American >60 >60    Calcium 8.8 8.3 - 10.6 mg/dL       ASSESSMENT AND PLAN     Active Problems:    Pneumonia    Acute respiratory failure with hypoxia (HCC)    COVID-19    Essential hypertension  Resolved Problems:    * No resolved hospital problems.  *     still on 5-6 L of O2    presently on al;l aggressive optimal covid pneumonia treatment    Prognosis id guarded    wife understands this

## 2021-08-24 PROCEDURE — 6360000002 HC RX W HCPCS: Performed by: INTERNAL MEDICINE

## 2021-08-24 PROCEDURE — 2580000003 HC RX 258: Performed by: INTERNAL MEDICINE

## 2021-08-24 PROCEDURE — 2700000000 HC OXYGEN THERAPY PER DAY

## 2021-08-24 PROCEDURE — 1200000000 HC SEMI PRIVATE

## 2021-08-24 PROCEDURE — 94760 N-INVAS EAR/PLS OXIMETRY 1: CPT

## 2021-08-24 PROCEDURE — 6370000000 HC RX 637 (ALT 250 FOR IP): Performed by: INTERNAL MEDICINE

## 2021-08-24 PROCEDURE — 2500000003 HC RX 250 WO HCPCS: Performed by: INTERNAL MEDICINE

## 2021-08-24 RX ADMIN — MUPIROCIN: 20 OINTMENT TOPICAL at 20:51

## 2021-08-24 RX ADMIN — REMDESIVIR 100 MG: 100 INJECTION, POWDER, LYOPHILIZED, FOR SOLUTION INTRAVENOUS at 11:28

## 2021-08-24 RX ADMIN — ENOXAPARIN SODIUM 100 MG: 100 INJECTION SUBCUTANEOUS at 20:51

## 2021-08-24 RX ADMIN — DEXAMETHASONE SODIUM PHOSPHATE 20 MG: 4 INJECTION, SOLUTION INTRA-ARTICULAR; INTRALESIONAL; INTRAMUSCULAR; INTRAVENOUS; SOFT TISSUE at 10:21

## 2021-08-24 RX ADMIN — AMLODIPINE BESYLATE 10 MG: 5 TABLET ORAL at 08:45

## 2021-08-24 RX ADMIN — MUPIROCIN: 20 OINTMENT TOPICAL at 08:45

## 2021-08-24 RX ADMIN — ENOXAPARIN SODIUM 100 MG: 100 INJECTION SUBCUTANEOUS at 08:45

## 2021-08-24 RX ADMIN — SODIUM CHLORIDE 30 ML: 9 INJECTION, SOLUTION INTRAVENOUS at 10:20

## 2021-08-24 ASSESSMENT — PAIN SCALES - GENERAL
PAINLEVEL_OUTOF10: 0

## 2021-08-24 NOTE — PROGRESS NOTES
Department of Internal Medicine  General Internal Medicine   Progress Note      SUBJECTIVE:  More alert , o2 requirement is not on rise   History obtained from chart review and the patient  General ROS: positive for  - fatigue and malaise  negative for - chills, fever or night sweats  Psychological ROS: negative  Ophthalmic ROS: negative  Respiratory ROS: positive for - cough, shortness of breath, tachypnea and wheezing  negative for - hemoptysis, stridor or wheezing  Cardiovascular ROS: positive for - dyspnea on exertion and shortness of breath  negative for - chest pain or palpitations  Gastrointestinal ROS: no abdominal pain, change in bowel habits, or black or bloody stools  Genito-Urinary ROS: no dysuria, trouble voiding, or hematuria  Musculoskeletal ROS: negative  Neurological ROS: no TIA or stroke symptoms  Dermatological ROS: negative    OBJECTIVE      Medications      Current Facility-Administered Medications: amLODIPine (NORVASC) tablet 10 mg, 10 mg, Oral, Daily  enoxaparin (LOVENOX) injection 100 mg, 1 mg/kg, Subcutaneous, BID  [COMPLETED] remdesivir 200 mg in sodium chloride 0.9 % 250 mL IVPB, 200 mg, Intravenous, Once **FOLLOWED BY** remdesivir 100 mg in sodium chloride 0.9 % 250 mL IVPB, 100 mg, Intravenous, Q24H  0.9 % sodium chloride bolus, 30 mL, Intravenous, PRN  dexamethasone (DECADRON) 20 mg in sodium chloride 0.9 % IVPB, 20 mg, Intravenous, Daily  mupirocin (BACTROBAN) 2 % ointment, , Topical, BID  ipratropium-albuterol (DUONEB) nebulizer solution 1 ampule, 1 ampule, Inhalation, Q4H PRN    Physical      Vitals: /68   Pulse 70   Temp 97.7 °F (36.5 °C) (Oral)   Resp 16   Ht 5' 10\" (1.778 m)   Wt 220 lb (99.8 kg)   SpO2 93%   BMI 31.57 kg/m²   Temp: Temp: 97.7 °F (36.5 °C)  Max: Temp  Av °F (36.7 °C)  Min: 97.7 °F (36.5 °C)  Max: 98.6 °F (37 °C)  Respiration range:  Resp  Av.7  Min: 16  Max: 19  Pulse Range:  Pulse  Av  Min: 54  Max: 73  Blood pressure range:  Systolic (24hrs), Av , Min:110 , DANIELA:173   , Diastolic (03CRT), JCS:86, Min:58, Max:78    SpO2  Av.1 %  Min: 88 %  Max: 96 %    Intake/Output Summary (Last 24 hours) at 2021 0850  Last data filed at 2021 0842  Gross per 24 hour   Intake 3147.51 ml   Output --   Net 3147.51 ml       Vent settings:  Pulse  Av.3  Min: 49  Max: 81  Resp  Av.5  Min: 16  Max: 30  SpO2  Av.5 %  Min: 70 %  Max: 99 %    CONSTITUTIONAL:  fatigued, alert, cooperative, mild distress, appears stated age and normal weight  EYES:  Unremarkable   NECK:  Mild JVD  and supple, symmetrical, trachea midline  BACK:  symmetric and no curvature  LUNGS:  tachypneic, Severe respiratory distress, moderate air exchange, no retractions and crackles diffuse, rhonchi diffuse, wheeze diffuse  CARDIOVASCULAR:  normal apical pulses, regular rate and rhythm, normal S1 and S2, no S3 and no S4  ABDOMEN:  Soft non tender BS =   MUSCULOSKELETAL:  Trace edema   NEUROLOGIC:  Grossly intact   SKIN:  Warm and dry  and no bruising or bleeding    Data      Recent Results (from the past 96 hour(s))   Blood gas, arterial    Collection Time: 21  6:30 PM   Result Value Ref Range    pH, Arterial 7.461 (H) 7.350 - 7.450    pCO2, Arterial 30.5 (L) 35.0 - 45.0 mmHg    pO2, Arterial 64.7 (L) 75.0 - 108.0 mmHg    HCO3, Arterial 21.7 21.0 - 29.0 mmol/L    Base Excess, Arterial -1.0 -3.0 - 3.0 mmol/L    Hemoglobin, Art, Extended 15.2 13.5 - 17.5 g/dL    O2 Sat, Arterial 93.2 >92 %    Carboxyhgb, Arterial 0.9 0.0 - 1.5 %    Methemoglobin, Arterial 0.2 <1.5 %    TCO2, Arterial 50.7 Not Established mmol/L    O2 Therapy Unknown    Procalcitonin    Collection Time: 21  6:18 AM   Result Value Ref Range    Procalcitonin 0.08 0.00 - 0.15 ng/mL   D-dimer, quantitative    Collection Time: 21  6:18 AM   Result Value Ref Range    D-Dimer, Quant 769 (H) 0 - 229 ng/mL DDU   C-reactive protein    Collection Time: 21  6:18 AM   Result Value Ref Range CRP 52.2 (H) 0.0 - 5.1 mg/L   Basic Metabolic Panel w/ Reflex to MG    Collection Time: 08/21/21  6:18 AM   Result Value Ref Range    Sodium 140 136 - 145 mmol/L    Potassium reflex Magnesium 4.2 3.5 - 5.1 mmol/L    Chloride 105 99 - 110 mmol/L    CO2 23 21 - 32 mmol/L    Anion Gap 12 3 - 16    Glucose 139 (H) 70 - 99 mg/dL    BUN 29 (H) 7 - 20 mg/dL    CREATININE 0.9 0.8 - 1.3 mg/dL    GFR Non-African American >60 >60    GFR African American >60 >60    Calcium 8.5 8.3 - 10.6 mg/dL   Procalcitonin    Collection Time: 08/22/21  9:10 AM   Result Value Ref Range    Procalcitonin 0.07 0.00 - 0.15 ng/mL   D-dimer, quantitative    Collection Time: 08/22/21  9:10 AM   Result Value Ref Range    D-Dimer, Quant 776 (H) 0 - 229 ng/mL DDU   C-reactive protein    Collection Time: 08/22/21  9:10 AM   Result Value Ref Range    CRP 29.3 (H) 0.0 - 5.1 mg/L   Basic Metabolic Panel w/ Reflex to MG    Collection Time: 08/22/21  9:10 AM   Result Value Ref Range    Sodium 141 136 - 145 mmol/L    Potassium reflex Magnesium 4.5 3.5 - 5.1 mmol/L    Chloride 107 99 - 110 mmol/L    CO2 23 21 - 32 mmol/L    Anion Gap 11 3 - 16    Glucose 156 (H) 70 - 99 mg/dL    BUN 29 (H) 7 - 20 mg/dL    CREATININE 1.0 0.8 - 1.3 mg/dL    GFR Non-African American >60 >60    GFR African American >60 >60    Calcium 8.8 8.3 - 10.6 mg/dL   Procalcitonin    Collection Time: 08/23/21  5:59 AM   Result Value Ref Range    Procalcitonin 0.07 0.00 - 0.15 ng/mL   D-dimer, quantitative    Collection Time: 08/23/21  5:59 AM   Result Value Ref Range    D-Dimer, Quant 726 (H) 0 - 229 ng/mL DDU   C-reactive protein    Collection Time: 08/23/21  5:59 AM   Result Value Ref Range    CRP 19.0 (H) 0.0 - 5.1 mg/L   Basic Metabolic Panel w/ Reflex to MG    Collection Time: 08/23/21  5:59 AM   Result Value Ref Range    Sodium 141 136 - 145 mmol/L    Potassium reflex Magnesium 4.2 3.5 - 5.1 mmol/L    Chloride 106 99 - 110 mmol/L    CO2 24 21 - 32 mmol/L    Anion Gap 11 3 - 16 Glucose 162 (H) 70 - 99 mg/dL    BUN 28 (H) 7 - 20 mg/dL    CREATININE 0.9 0.8 - 1.3 mg/dL    GFR Non-African American >60 >60    GFR African American >60 >60    Calcium 8.7 8.3 - 10.6 mg/dL       ASSESSMENT AND PLAN     Active Problems:    Pneumonia    Acute respiratory failure with hypoxia ([address]    COVID-19    Essential hypertension  Resolved Problems:    * No resolved hospital problems.  *     ct present care    wife fully updated    she has no questions and concerns ,   prognosis slightly improved

## 2021-08-24 NOTE — PROGRESS NOTES
Per IDT meeting:  home O2 eval needed for planned discharge home tomorrow.  Plan for around 14:00  O2 delivery via nc to maintain saturations 90-92%

## 2021-08-24 NOTE — PROGRESS NOTES
Wife notified of probable discharge for tomorrow. At his notification, she verbalizes concerns regarding patient's safe discharge to home at this time. RN states that patient will have a home oxygen evaluation prior to discharge to discern needs. RN queries as to concerns, to which wife replies \"I don't know I'm not the nurse\". Communication with wife ultimately has her elicit that concerns exist r/t 12-13 steps to get to bedroom.  Case Mgt RN is notified as to this exchange and requests that this RN notify attending to request PT/OT eval. Msg sent to attending for this request.

## 2021-08-25 ENCOUNTER — APPOINTMENT (OUTPATIENT)
Dept: GENERAL RADIOLOGY | Age: 71
DRG: 177 | End: 2021-08-25
Payer: MEDICARE

## 2021-08-25 LAB
ANION GAP SERPL CALCULATED.3IONS-SCNC: 11 MMOL/L (ref 3–16)
BUN BLDV-MCNC: 29 MG/DL (ref 7–20)
CALCIUM SERPL-MCNC: 8.8 MG/DL (ref 8.3–10.6)
CHLORIDE BLD-SCNC: 105 MMOL/L (ref 99–110)
CO2: 24 MMOL/L (ref 21–32)
CREAT SERPL-MCNC: 1 MG/DL (ref 0.8–1.3)
GFR AFRICAN AMERICAN: >60
GFR NON-AFRICAN AMERICAN: >60
GLUCOSE BLD-MCNC: 117 MG/DL (ref 70–99)
HCT VFR BLD CALC: 45.2 % (ref 40.5–52.5)
HEMOGLOBIN: 15.5 G/DL (ref 13.5–17.5)
MCH RBC QN AUTO: 29.8 PG (ref 26–34)
MCHC RBC AUTO-ENTMCNC: 34.2 G/DL (ref 31–36)
MCV RBC AUTO: 87.2 FL (ref 80–100)
PDW BLD-RTO: 14.3 % (ref 12.4–15.4)
PLATELET # BLD: 320 K/UL (ref 135–450)
PMV BLD AUTO: 7.8 FL (ref 5–10.5)
POTASSIUM SERPL-SCNC: 4.3 MMOL/L (ref 3.5–5.1)
RBC # BLD: 5.18 M/UL (ref 4.2–5.9)
SODIUM BLD-SCNC: 140 MMOL/L (ref 136–145)
WBC # BLD: 7 K/UL (ref 4–11)

## 2021-08-25 PROCEDURE — 36415 COLL VENOUS BLD VENIPUNCTURE: CPT

## 2021-08-25 PROCEDURE — 97116 GAIT TRAINING THERAPY: CPT

## 2021-08-25 PROCEDURE — 94640 AIRWAY INHALATION TREATMENT: CPT

## 2021-08-25 PROCEDURE — 97162 PT EVAL MOD COMPLEX 30 MIN: CPT

## 2021-08-25 PROCEDURE — 6370000000 HC RX 637 (ALT 250 FOR IP): Performed by: INTERNAL MEDICINE

## 2021-08-25 PROCEDURE — 97161 PT EVAL LOW COMPLEX 20 MIN: CPT

## 2021-08-25 PROCEDURE — 94761 N-INVAS EAR/PLS OXIMETRY MLT: CPT

## 2021-08-25 PROCEDURE — 2580000003 HC RX 258: Performed by: INTERNAL MEDICINE

## 2021-08-25 PROCEDURE — 2060000000 HC ICU INTERMEDIATE R&B

## 2021-08-25 PROCEDURE — 6360000002 HC RX W HCPCS: Performed by: INTERNAL MEDICINE

## 2021-08-25 PROCEDURE — 80048 BASIC METABOLIC PNL TOTAL CA: CPT

## 2021-08-25 PROCEDURE — 2700000000 HC OXYGEN THERAPY PER DAY

## 2021-08-25 PROCEDURE — 97530 THERAPEUTIC ACTIVITIES: CPT

## 2021-08-25 PROCEDURE — 71045 X-RAY EXAM CHEST 1 VIEW: CPT

## 2021-08-25 PROCEDURE — 97166 OT EVAL MOD COMPLEX 45 MIN: CPT

## 2021-08-25 PROCEDURE — 85027 COMPLETE CBC AUTOMATED: CPT

## 2021-08-25 PROCEDURE — 1200000000 HC SEMI PRIVATE

## 2021-08-25 PROCEDURE — 2500000003 HC RX 250 WO HCPCS: Performed by: INTERNAL MEDICINE

## 2021-08-25 RX ORDER — ALBUTEROL SULFATE 90 UG/1
2 AEROSOL, METERED RESPIRATORY (INHALATION) 4 TIMES DAILY
Status: DISCONTINUED | OUTPATIENT
Start: 2021-08-25 | End: 2021-08-31 | Stop reason: HOSPADM

## 2021-08-25 RX ORDER — DEXAMETHASONE SODIUM PHOSPHATE 10 MG/ML
10 INJECTION, SOLUTION INTRAMUSCULAR; INTRAVENOUS DAILY
Status: DISCONTINUED | OUTPATIENT
Start: 2021-08-25 | End: 2021-08-25

## 2021-08-25 RX ORDER — DEXAMETHASONE SODIUM PHOSPHATE 10 MG/ML
20 INJECTION, SOLUTION INTRAMUSCULAR; INTRAVENOUS DAILY
Status: DISCONTINUED | OUTPATIENT
Start: 2021-08-26 | End: 2021-08-25 | Stop reason: ALTCHOICE

## 2021-08-25 RX ORDER — IPRATROPIUM BROMIDE AND ALBUTEROL SULFATE 2.5; .5 MG/3ML; MG/3ML
1 SOLUTION RESPIRATORY (INHALATION)
Status: DISCONTINUED | OUTPATIENT
Start: 2021-08-25 | End: 2021-08-25

## 2021-08-25 RX ORDER — DEXAMETHASONE 4 MG/1
6 TABLET ORAL EVERY 12 HOURS SCHEDULED
Status: DISCONTINUED | OUTPATIENT
Start: 2021-08-25 | End: 2021-08-25

## 2021-08-25 RX ORDER — IVERMECTIN 3 MG/1
200 TABLET ORAL ONCE
Status: DISCONTINUED | OUTPATIENT
Start: 2021-08-25 | End: 2021-08-25 | Stop reason: RX

## 2021-08-25 RX ADMIN — AMLODIPINE BESYLATE 10 MG: 5 TABLET ORAL at 09:28

## 2021-08-25 RX ADMIN — DEXAMETHASONE SODIUM PHOSPHATE 20 MG: 4 INJECTION, SOLUTION INTRA-ARTICULAR; INTRALESIONAL; INTRAMUSCULAR; INTRAVENOUS; SOFT TISSUE at 09:49

## 2021-08-25 RX ADMIN — MUPIROCIN: 20 OINTMENT TOPICAL at 09:32

## 2021-08-25 RX ADMIN — ENOXAPARIN SODIUM 100 MG: 100 INJECTION SUBCUTANEOUS at 20:55

## 2021-08-25 RX ADMIN — ENOXAPARIN SODIUM 100 MG: 100 INJECTION SUBCUTANEOUS at 09:28

## 2021-08-25 RX ADMIN — MUPIROCIN: 20 OINTMENT TOPICAL at 20:55

## 2021-08-25 RX ADMIN — Medication 2 PUFF: at 21:03

## 2021-08-25 RX ADMIN — REMDESIVIR 100 MG: 100 INJECTION, POWDER, LYOPHILIZED, FOR SOLUTION INTRAVENOUS at 18:25

## 2021-08-25 ASSESSMENT — PAIN SCALES - GENERAL
PAINLEVEL_OUTOF10: 0

## 2021-08-25 NOTE — PROGRESS NOTES
Department of Internal Medicine  General Internal Medicine   Progress Note      SUBJECTIVE:  Still has significant distress  Needs 5 L O2 for last few days , appetite and mental status fair   History obtained from chart review and the patient  General ROS: positive for  - fatigue and malaise  negative for - chills, fever or night sweats  Psychological ROS: negative  Ophthalmic ROS: negative  Respiratory ROS: positive for - cough, shortness of breath, tachypnea and wheezing  negative for - hemoptysis, stridor or wheezing  Cardiovascular ROS: positive for - dyspnea on exertion and shortness of breath  negative for - chest pain or palpitations  Gastrointestinal ROS: no abdominal pain, change in bowel habits, or black or bloody stools  Genito-Urinary ROS: no dysuria, trouble voiding, or hematuria  Musculoskeletal ROS: negative  Neurological ROS: no TIA or stroke symptoms  Dermatological ROS: negative    OBJECTIVE      Medications      Current Facility-Administered Medications: dexamethasone (DECADRON) tablet 6 mg, 6 mg, Oral, 2 times per day  amLODIPine (NORVASC) tablet 10 mg, 10 mg, Oral, Daily  enoxaparin (LOVENOX) injection 100 mg, 1 mg/kg, Subcutaneous, BID  0.9 % sodium chloride bolus, 30 mL, IntraVENous, PRN  mupirocin (BACTROBAN) 2 % ointment, , Topical, BID  ipratropium-albuterol (DUONEB) nebulizer solution 1 ampule, 1 ampule, Inhalation, Q4H PRN    Physical      Vitals: /69   Pulse 64   Temp 98.3 °F (36.8 °C) (Oral)   Resp 18   Ht 5' 10\" (1.778 m)   Wt 220 lb (99.8 kg)   SpO2 91%   BMI 31.57 kg/m²   Temp: Temp: 98.3 °F (36.8 °C)  Max: Temp  Av.8 °F (36.6 °C)  Min: 97.5 °F (36.4 °C)  Max: 98.3 °F (36.8 °C)  Respiration range:  Resp  Av.6  Min: 18  Max: 20  Pulse Range:  Pulse  Av.6  Min: 50  Max: 87  Blood pressure range:  Systolic (50VHN), UXA:365 , Min:119 , LEC:383   , Diastolic (61AUT), HIB:55, Min:63, Max:78    SpO2  Av.7 %  Min: 88 %  Max: 95 %    Intake/Output Summary (Last 24 hours) at 2021 09  Last data filed at 2021  Gross per 24 hour   Intake 240 ml   Output --   Net 240 ml       Vent settings:  Pulse  Av.6  Min: 49  Max: 87  Resp  Av.3  Min: 16  Max: 30  SpO2  Av.2 %  Min: 70 %  Max: 99 %    CONSTITUTIONAL:  fatigued, alert, cooperative, mild distress, appears stated age and normal weight  EYES:  Unremarkable   NECK:  Mild JVD  and supple, symmetrical, trachea midline  BACK:  symmetric and no curvature  LUNGS:  tachypneic, Severe respiratory distress, moderate air exchange, no retractions and crackles diffuse, rhonchi diffuse, wheeze diffuse  CARDIOVASCULAR:  normal apical pulses, regular rate and rhythm, normal S1 and S2, no S3 and no S4  ABDOMEN:  Soft non tender BS =   MUSCULOSKELETAL:  Trace edema   NEUROLOGIC:  Grossly intact   SKIN:  Warm and dry  and no bruising or bleeding    Data      Recent Results (from the past 96 hour(s))   Procalcitonin    Collection Time: 21  9:10 AM   Result Value Ref Range    Procalcitonin 0.07 0.00 - 0.15 ng/mL   D-dimer, quantitative    Collection Time: 21  9:10 AM   Result Value Ref Range    D-Dimer, Quant 776 (H) 0 - 229 ng/mL DDU   C-reactive protein    Collection Time: 21  9:10 AM   Result Value Ref Range    CRP 29.3 (H) 0.0 - 5.1 mg/L   Basic Metabolic Panel w/ Reflex to MG    Collection Time: 21  9:10 AM   Result Value Ref Range    Sodium 141 136 - 145 mmol/L    Potassium reflex Magnesium 4.5 3.5 - 5.1 mmol/L    Chloride 107 99 - 110 mmol/L    CO2 23 21 - 32 mmol/L    Anion Gap 11 3 - 16    Glucose 156 (H) 70 - 99 mg/dL    BUN 29 (H) 7 - 20 mg/dL    CREATININE 1.0 0.8 - 1.3 mg/dL    GFR Non-African American >60 >60    GFR African American >60 >60    Calcium 8.8 8.3 - 10.6 mg/dL   Procalcitonin    Collection Time: 21  5:59 AM   Result Value Ref Range    Procalcitonin 0.07 0.00 - 0.15 ng/mL   D-dimer, quantitative    Collection Time: 21  5:59 AM   Result Value Ref Range    D-Dimer, Quant 726 (H) 0 - 229 ng/mL DDU   C-reactive protein    Collection Time: 08/23/21  5:59 AM   Result Value Ref Range    CRP 19.0 (H) 0.0 - 5.1 mg/L   Basic Metabolic Panel w/ Reflex to MG    Collection Time: 08/23/21  5:59 AM   Result Value Ref Range    Sodium 141 136 - 145 mmol/L    Potassium reflex Magnesium 4.2 3.5 - 5.1 mmol/L    Chloride 106 99 - 110 mmol/L    CO2 24 21 - 32 mmol/L    Anion Gap 11 3 - 16    Glucose 162 (H) 70 - 99 mg/dL    BUN 28 (H) 7 - 20 mg/dL    CREATININE 0.9 0.8 - 1.3 mg/dL    GFR Non-African American >60 >60    GFR African American >60 >60    Calcium 8.7 8.3 - 10.6 mg/dL       ASSESSMENT AND PLAN     Active Problems:    Pneumonia    Acute respiratory failure with hypoxia (HCC)    COVID-19    Essential hypertension  Resolved Problems:    * No resolved hospital problems.  *     prognosis is still guarded    oxygen requirement is not dropping    will need SNF Placement    PT OT

## 2021-08-25 NOTE — PROGRESS NOTES
Message sent to Dr. Jha Santi him that pt cannot be transferred to [facility]. We are operating as PCU, monitoring O2.

## 2021-08-25 NOTE — PROGRESS NOTES
Pt found to be 86% on 5L. Placed on high flow 15L and proning. Tele placed to monitor O2 at nurses station. O2 decreased to 6L while RN staff in room. RT called to room to evaluate. Decadron IVPB infusing.

## 2021-08-25 NOTE — PROGRESS NOTES
Occupational Therapy   Occupational Therapy Initial Assessment  Date: 2021   Patient Name: Jeferson Mathias  MRN: [id number]     : 1950    Date of Service: 2021    Discharge Recommendations: Jeferson Mathias scored a 21/24 on the AM-PAC ADL Inpatient form. Current research shows that an AM-PAC score of 18 or greater is typically associated with a discharge to the patient's home setting. Based on the patient's AM-PAC score, and their current ADL deficits, it is recommended that the patient have 2-3 sessions per week of Occupational Therapy at d/c to increase the patient's independence. At this time, this patient demonstrates the endurance and safety to discharge home with home services (home vs OP services) and a follow up treatment frequency of 2-3x/wk. Please see assessment section for further patient specific details. If patient discharges prior to next session this note will serve as a discharge summary. Please see below for the latest assessment towards goals. HOME HEALTH CARE: LEVEL 2 SOCIAL     - Initial home health evaluation to occur within 24-48 hours, in patient home   - Therapy to evaluate with goal of regaining prior level of functioning   - Therapy to evaluate if patient has [id number] needs for personal care   -  evaluation within 24-48 hours, includes evaluation of resources and insurance to determine AL/IL/LTC/Medicaid options   - [place] on Aging Referral   - Amelia Lundberg to discuss home support and care needs post discharge within two weeks of discharge. OT Equipment Recommendations  Equipment Needed: Yes  Other: Would benefit from shower chair for energy conservation upon discharge    Assessment   Performance deficits / Impairments: Decreased functional mobility ; Decreased endurance;Decreased ADL status; Decreased balance;Decreased strength;Decreased high-level IADLs;Decreased safe awareness  Assessment: Patient presents below baseline d/t above deficits; OT indicated to maximize safety/independence with ADL and IADL  Treatment Diagnosis: Above deficits associated with PNA  Prognosis: Good  Decision Making: Medium Complexity  Exam: as above  OT Education: OT Role;Plan of Care  Patient Education: eval, discharge--patient v/u but would benefit from reinforcement for carryover  REQUIRES OT FOLLOW UP: Yes  Activity Tolerance  Activity Tolerance: Patient limited by fatigue  Activity Tolerance: 9L/min 90%~ supine, 88% seated EOB, recovers to 90% with cuing for breathing, desats to 79%-80% with ambulation () does not recover past 85%, increased to 11L/min, comes up to 87% but then hovers around 84%, requires increase to 13L/min for recovery to 91% while sidelying (HR 63), lefts at 9L/min at 90% sidelying with HR 63bpm  Safety Devices  Safety Devices in place: Yes  Type of devices: All fall risk precautions in place;Nurse notified;Gait belt;Bed alarm in place; Left in bed           Patient Diagnosis(es): The primary encounter diagnosis was Suspected COVID-19 virus infection. Diagnoses of Acute respiratory failure with hypoxia (HCC) and Pneumonia of both lower lobes due to infectious organism were also pertinent to this visit. has a past medical history of Essential hypertension and Pneumonia. has a past surgical history that includes Appendectomy; skin biopsy; and Tonsillectomy.     Treatment Diagnosis: Above deficits associated with PNA      Restrictions  Restrictions/Precautions  Restrictions/Precautions: Fall Risk, Isolation (low fall risk; Droplet Plus Isolation)  Required Braces or Orthoses?: No  Position Activity Restriction  Sternal Precautions: 9L/min 90%~ supine, 88% seated EOB, recovers to 90% with cuing for breathing, desats to 79%-80% with ambulation () does not recover past 85%, increased to 11L/min, comes up to 87% but then hovers around 84%, requires increase to 13L/min for recovery to 91% while sidelying (HR 63), MOT OTR/L [id number]    Bandar Muhammad, OT

## 2021-08-25 NOTE — CARE COORDINATION
CM received call back from [place] with [facility] and she states they will follow pt and can take him at 10 Liters of oxygen or under (pt currently on 12L ) . She states pt will also need a pre cert.      Андрей Kebede RN, BSN  [phone]

## 2021-08-25 NOTE — PROGRESS NOTES
Spoke with pt's daughters Slime Amin and Yomi Deras) and updated them on patient status. Spoke with pt's wife, Leesa Garcia, and gave lengthy update for her as well. Encouraged her to allow patient to get some rest and decrease frequency of her calls to him.

## 2021-08-25 NOTE — PROGRESS NOTES
Physical Therapy    Facility/Department: [address] ONCOLOGY  Initial Assessment    NAME: Nellie Camara  : 1950  MRN: [id number]    Date of Service: 2021    Discharge Recommendations: Nellie Camara scored a 21/24 on the AM-PAC short mobility form. Current research shows that an AM-PAC score of 18 or greater is typically associated with a discharge to the patient's home setting. Based on the patient's AM-PAC score and their current functional mobility deficits, it is recommended that the patient have 2-3 sessions per week of Physical Therapy at d/c to increase the patient's independence. At this time, this patient demonstrates the endurance and safety to discharge home with home health services and a follow up treatment frequency of 2-3x/wk. Please see assessment section for further patient specific details. HOME HEALTH CARE: LEVEL 2 SOCIAL  - Initial home health evaluation to occur within 24-48 hours, in patient home   - Therapy to evaluate with goal of regaining prior level of functioning   - Therapy to evaluate if patient has [address] needs for personal care   -  evaluation within 24-48 hours, includes evaluation of resources and insurance to determine AL/IL/LTC/Medicaid options   - [place] [facility] Aging Referral   - Amelia Lundberg to discuss home support and care needs post discharge within two weeks of discharge. If patient discharges prior to next session this note will serve as a discharge summary. Please see below for the latest assessment towards goals. PT Equipment Recommendations  Equipment Needed: No    Assessment   Body structures, Functions, Activity limitations: Decreased functional mobility ; Decreased safe awareness;Decreased endurance  Assessment: Pt reports he only feels short of breath due to the cough he has from pneumonia. During activity the pt's SpO2 would drop significantly (low 80's) and he reported no symptoms.  Pt required extensively increased time for recovery and increased supplemental O2 and return to bed in order to allow for adequate recovery. Pt needs continued therapy to continue building endurance so that he can return home safely. Treatment Diagnosis: decreased functional mobility, and endurance  Prognosis: Good  Decision Making: Medium Complexity  Clinical Presentation: evolving  PT Education: Energy Conservation;Plan of Care;PT Role;General Safety; Disease Specific Education  Patient Education: pt was educated on energy conservation and SpO2 levels. Pt did not demonstrate full comprehension of the information  Barriers to Learning: none  REQUIRES PT FOLLOW UP: Yes  Activity Tolerance  Activity Tolerance: Treatment limited secondary to medical complications (free text)  Activity Tolerance: 9L/min 90%~ supine, 88% seated EOB, recovers to 90% with cuing for breathing, desats to 79%-80% with ambulation () does not recover past 85%, increased to 11L/min, comes up to 87% but then hovers around 84%, requires increase to 13L/min for recovery to 91% while sidelying (HR 63), lefts at 9L/min at 90% sidelying with HR 63bpm       Patient Diagnosis(es): The primary encounter diagnosis was Suspected COVID-19 virus infection. Diagnoses of Acute respiratory failure with hypoxia (HCC) and Pneumonia of both lower lobes due to infectious organism were also pertinent to this visit. has a past medical history of Essential hypertension and Pneumonia. has a past surgical history that includes Appendectomy; skin biopsy; and Tonsillectomy.     Restrictions  Restrictions/Precautions  Restrictions/Precautions: Fall Risk, Isolation (low fall risk; Droplet Plus Isolation)  Required Braces or Orthoses?: No  Position Activity Restriction  Sternal Precautions: 9L/min 90%~ supine, 88% seated EOB, recovers to 90% with cuing for breathing, desats to 79%-80% with ambulation () does not recover past 85%, increased to 11L/min, comes up to 87% but then hovers around 84%, requires increase to 13L/min for recovery to 91% while sidelying (HR 63), lefts at 9L/min at 90% sidelying with HR 63bpm  Other position/activity restrictions: COVID-19     Vision/Hearing  Vision: Within Functional Limits  Hearing: Within functional limits       Subjective  General  Chart Reviewed: Yes  Patient assessed for rehabilitation services?: Yes  Response To Previous Treatment: Not applicable  Family / Caregiver Present: No  Diagnosis: Pneumonia  Follows Commands: Within Functional Limits  Subjective  Subjective: pt sidelying in bed upon arrival, consented to PT/OT care  Pain Screening  Patient Currently in Pain: Denies  Vital Signs  Patient Currently in Pain: Denies       Orientation  Orientation  Overall Orientation Status: Within Functional Limits     Social/Functional History  Social/Functional History  Lives With: Spouse  Type of Home: House  Home Layout: Two level (bed and bath on second floor)  Home Access: Stairs to enter with rails (13-14 steps to enter home)  Entrance Stairs - Number of Steps: 13-14  Entrance Stairs - Rails: Left  Bathroom Shower/Tub: Walk-in shower  Bathroom Toilet: Standard  ADL Assistance: Independent  Homemaking Assistance: Independent  Homemaking Responsibilities: Yes  Ambulation Assistance: Independent  Transfer Assistance: Independent  Active : Yes  Occupation: Retired  Additional Comments: no falls in last 3-6 months     Cognition   Cognition  Overall Cognitive Status: WFL    Objective     Observation/Palpation  Posture: Good    PROM RLE (degrees)  RLE PROM: WFL  AROM RLE (degrees)  RLE AROM: WFL  PROM LLE (degrees)  LLE PROM: WFL  AROM LLE (degrees)  LLE AROM : WFL  Strength RLE  Strength RLE: WFL  Strength LLE  Strength LLE: WFL  Tone RLE  RLE Tone: Normotonic  Tone LLE  LLE Tone: Normotonic  Motor Control  Gross Motor?: WFL  Sensation  Overall Sensation Status: WFL  Bed mobility  Supine to Sit: Stand by assistance  Sit to Supine: Stand by assistance  Scooting: Stand by assistance  Transfers  Sit to Stand: Stand by assistance  Stand to sit: Stand by assistance  Ambulation  Ambulation?: Yes  Ambulation 1  Surface: level tile  Device: No Device  Assistance: Stand by assistance  Distance: 40'  Comments: pt reported he felt as if he was walking how he normally does at home  Stairs/Curb  Stairs?: No     Balance  Posture: Good  Sitting - Static: Good (pt seated EOB ~3:00 for SpO2 to rise.  no LOB)  Sitting - Dynamic: Good (no LOB while managing lines while seated at EOB)  Standing - Static: Good (no LOB while standing and waiting for SpO2 levels to rise)  Standing - Dynamic: Good (no LOB while ambulating in room)        Plan   Plan  Times per week: 1-2  Times per day: Daily  Current Treatment Recommendations: Functional Mobility Training, Stair training, Gait Training, Endurance Training, Home Exercise Program  Safety Devices  Type of devices: Bed alarm in place, Call light within reach, Patient at risk for falls, Left in bed, Nurse notified  Restraints  Initially in place: No    G-Code       OutComes Score                   AM-PAC Score  AM-PAC Inpatient Mobility Raw Score : 21 (08/25/21 1203)  AM-PAC Inpatient T-Scale Score : 50.25 (08/25/21 1203)  Mobility Inpatient CMS 0-100% Score: 28.97 (08/25/21 1203)  Mobility Inpatient CMS G-Code Modifier : Dickens Gene (08/25/21 1203)          Goals  Short term goals  Time Frame for Short term goals: prior to d/c  Short term goal 1: pt will perform supine to sit independently with SpO2 remaining above 88%  Short term goal 2: pt will perform sit to stand independently with SpO2 remaining above 88%  Short term goal 3: pt will ambulate 20' in room with SpO2 remaining above 88%  Short term goal 4: pt will negotiate 13 steps withLRAD and supervision       Therapy Time   Individual Concurrent Group Co-treatment   Time In 1040         Time Out 1133         Minutes 53              Timed Code Treatment Minutes:   38    Total Treatment Minutes:  [address], [place]    PT providing direct supervision during session and assisting in making skilled judgements throughout session.   [id number] [facility] PT, DPT [id number]    [id number] [facility], PT

## 2021-08-25 NOTE — CARE COORDINATION
RAI called and spoke to pt's wife about poc. She states her  is normally \"strong as an ox\" and has no problems. Rai discussed Covid accepting facilities for recovery and that [address] is within his [employer]. Rai informed her I referred to them so they can follow in case needed at d/c. She is fine with [facility] as they live in [place] and is close to them. Rai discussed with her that pt is currently on 12 liters of oxygen and we will monitor to see if he can be weaned down to an acceptable level and if he can he scores well enough now with therapy for home care. RAI explained if pt improves for home care and oxygen at d/c we can provide. She verbalized understanding. Spouse is fine with referral to [facility] or [facility] on d/c.    RAI called Yris Ross in admissions at [address] and discussed pt and gave information. She will review and call CM back. She is also going to clarify how much oxygen they can accept a pt on, she believes it is 10 liters.        Jeremy Lehman, RN, BSN  [phone]

## 2021-08-25 NOTE — CONSULTS
Clinical Pharmacy Note    Pharmacy consulted by Dr. Becky Ambrose to restart remdesivir for 10 more days. Patient recently completed 5 days of remdesivir on 8/24. Patient was doing better at 5L of O2 requirement however patient is now back up to 15L high flow NC. Dexamethasone also increased back up to 20 mg Q24H. Pharmacy will continue to follow.      Delilah Burr PharmD  Pharmacy Resident   [id number]

## 2021-08-25 NOTE — PROGRESS NOTES
Nutrition Assessment     Type and Reason for Visit: Initial (LOS assessment)    Nutrition Recommendations/Plan:   No nutrition related recommendations at this time     Nutrition Assessment:  Pt assessed for nutrition risk. Pt with good PO intake on regular diet, consuming % of meals. No reported weight loss. Skin is in tact. LBM 8/23. Deemed to be at low nutrition risk at this time. Will continue to monitor for changes in status. Malnutrition Assessment:  Malnutrition Status: No malnutrition    Nutrition Related Findings: No edema noted      Current Nutrition Therapies:    ADULT DIET;  Regular    Anthropometric Measures:  · Height: 5' 10\" (177.8 cm)  · Current Body Wt: 220 lb 0.3 oz (99.8 kg)   · BMI: 31.6    Nutrition Diagnosis:   No nutrition diagnosis at this time     Nutrition Interventions:   Food and/or Nutrient Delivery:  Continue Current Diet  Nutrition Education/Counseling:  Education not indicated   Coordination of Nutrition Care:  Continue to monitor while inpatient    Goals:  Pt will continue to consume at least 50% of meals       Nutrition Monitoring and Evaluation:   Behavioral-Environmental Outcomes:  None Identified   Food/Nutrient Intake Outcomes:  Food and Nutrient Intake  Physical Signs/Symptoms Outcomes:  None Identified     Discharge Planning:    No discharge needs at this time     Electronically signed by Gerson Vasquez RD, FREEDOM on 8/25/21 at 2:21 PM EDT    Contact: [phone]

## 2021-08-26 LAB
C-REACTIVE PROTEIN: 4.4 MG/L (ref 0–5.1)
D DIMER: 803 NG/ML DDU (ref 0–229)
FERRITIN: 1405 NG/ML (ref 30–400)

## 2021-08-26 PROCEDURE — 2060000000 HC ICU INTERMEDIATE R&B

## 2021-08-26 PROCEDURE — 99233 SBSQ HOSP IP/OBS HIGH 50: CPT | Performed by: INTERNAL MEDICINE

## 2021-08-26 PROCEDURE — 86140 C-REACTIVE PROTEIN: CPT

## 2021-08-26 PROCEDURE — 2500000003 HC RX 250 WO HCPCS: Performed by: INTERNAL MEDICINE

## 2021-08-26 PROCEDURE — 6370000000 HC RX 637 (ALT 250 FOR IP): Performed by: INTERNAL MEDICINE

## 2021-08-26 PROCEDURE — 2580000003 HC RX 258: Performed by: INTERNAL MEDICINE

## 2021-08-26 PROCEDURE — 6360000002 HC RX W HCPCS: Performed by: INTERNAL MEDICINE

## 2021-08-26 PROCEDURE — 94640 AIRWAY INHALATION TREATMENT: CPT

## 2021-08-26 PROCEDURE — 82728 ASSAY OF FERRITIN: CPT

## 2021-08-26 PROCEDURE — 36415 COLL VENOUS BLD VENIPUNCTURE: CPT

## 2021-08-26 PROCEDURE — 85379 FIBRIN DEGRADATION QUANT: CPT

## 2021-08-26 PROCEDURE — 1200000000 HC SEMI PRIVATE

## 2021-08-26 PROCEDURE — 94761 N-INVAS EAR/PLS OXIMETRY MLT: CPT

## 2021-08-26 PROCEDURE — 2700000000 HC OXYGEN THERAPY PER DAY

## 2021-08-26 PROCEDURE — 94660 CPAP INITIATION&MGMT: CPT

## 2021-08-26 RX ADMIN — Medication 2 PUFF: at 12:28

## 2021-08-26 RX ADMIN — AMLODIPINE BESYLATE 10 MG: 5 TABLET ORAL at 09:06

## 2021-08-26 RX ADMIN — Medication 2 PUFF: at 12:27

## 2021-08-26 RX ADMIN — Medication 2 PUFF: at 09:11

## 2021-08-26 RX ADMIN — SODIUM CHLORIDE 30 ML: 9 INJECTION, SOLUTION INTRAVENOUS at 17:48

## 2021-08-26 RX ADMIN — Medication 2 PUFF: at 09:10

## 2021-08-26 RX ADMIN — DEXAMETHASONE SODIUM PHOSPHATE 20 MG: 4 INJECTION, SOLUTION INTRA-ARTICULAR; INTRALESIONAL; INTRAMUSCULAR; INTRAVENOUS; SOFT TISSUE at 09:09

## 2021-08-26 RX ADMIN — Medication 2 PUFF: at 15:35

## 2021-08-26 RX ADMIN — ENOXAPARIN SODIUM 100 MG: 100 INJECTION SUBCUTANEOUS at 20:04

## 2021-08-26 RX ADMIN — SODIUM CHLORIDE 25 ML: 9 INJECTION, SOLUTION INTRAVENOUS at 09:09

## 2021-08-26 RX ADMIN — REMDESIVIR 100 MG: 100 INJECTION, POWDER, LYOPHILIZED, FOR SOLUTION INTRAVENOUS at 17:50

## 2021-08-26 RX ADMIN — MUPIROCIN: 20 OINTMENT TOPICAL at 09:06

## 2021-08-26 RX ADMIN — ENOXAPARIN SODIUM 100 MG: 100 INJECTION SUBCUTANEOUS at 09:06

## 2021-08-26 RX ADMIN — MUPIROCIN: 20 OINTMENT TOPICAL at 20:05

## 2021-08-26 RX ADMIN — Medication 2 PUFF: at 21:21

## 2021-08-26 RX ADMIN — Medication 2 PUFF: at 21:23

## 2021-08-26 ASSESSMENT — PAIN SCALES - GENERAL
PAINLEVEL_OUTOF10: 0

## 2021-08-26 NOTE — PROGRESS NOTES
Spoke with Charlene Koenig, daughter, update given. All questions and concerns addressed. Will have pt call once more awake.

## 2021-08-26 NOTE — CARE COORDINATION
Pt now requiring 15 liters of oxygen and on IV Remdesivir again until end date of 8/30/21. CM sent referral over epic at [facility] follow.      Андрей Kebede RN, BSN  [phone]

## 2021-08-26 NOTE — PROGRESS NOTES
normal sensorium globally. LABS:   LABS:  Recent Labs     08/25/21  0959   WBC 7.0   HGB 15.5   HCT 45.2         K 4.3      CREATININE 1.0   BUN 29*   CO2 24       Recent Labs     08/25/21  0959   GLUCOSE 117*   CALCIUM 8.8      K 4.3   CO2 24      BUN 29*   CREATININE 1.0       No results for input(s): PHART, RLO4SGN, PO2ART, ETU4JRR, O1PRDEFO, BEART, E6MHXMJX in the last 72 hours. No results found for: INR, PROTIME  No results found for: AMYLASE   No results found for: LABA1C  No results found for: EAG  No results found for: TSH, Y1XUXMJ, P6HYEEG, THYROIDAB, FT3, T4FREE  Lab Results   Component Value Date    CKTOTAL 325 (H) 08/19/2021    TROPONINI <0.01 08/19/2021      Lab Results   Component Value Date    CRP 19.0 (H) 08/23/2021      No results found for: BNP   Lab Results   Component Value Date    DDIMER 726 (H) 08/23/2021      Lab Results   Component Value Date    FERRITIN 3,043.0 (H) 08/19/2021      Lab Results   Component Value Date    LACTA 1.9 08/19/2021       IMAGING:   I reviewed the chest x-ray from 8/25/2021 and my interpretation is as follows. Bilateral peripheral lung infiltrates which are unchanged from previous chest imaging. No pleural effusion, pneumothorax seen. Cardiac silhouette is normal limits. IMPRESSION:   Acute hypoxic respiratory failure, worsened  Pneumonia due to COVID-19, persistent  Hypercoagulable state associated with COVID-19  Obesity    RECOMMENDATION:   -Patient's oxygen requirements have worsened without any increase in respiratory distress.  -Possibilities for worsening oxygenation include acute PE, active pneumonitis, pulmonary fibrosis, mucous plugging.  -I will recheck all his inflammatory markers.  -Patient is already on therapeutic Lovenox. Low suspicion for acute PE. -Chest imaging shows stable bilateral lung infiltrates.  Low suspicion for mucus plugging.   -Can continue with IV decadron at 20 mg daily.   -Patient has

## 2021-08-26 NOTE — PLAN OF CARE
Problem: Falls - Risk of:  Goal: Will remain free from falls  Description: Will remain free from falls  Outcome: Ongoing  Goal: Absence of physical injury  Description: Absence of physical injury  Outcome: Ongoing     Problem: Airway Clearance - Ineffective  Goal: Achieve or maintain patent airway  Outcome: Ongoing     Problem: Gas Exchange - Impaired  Goal: Absence of hypoxia  Outcome: Ongoing  Goal: Promote optimal lung function  Outcome: Ongoing     Problem: Breathing Pattern - Ineffective  Goal: Ability to achieve and maintain a regular respiratory rate  Outcome: Ongoing     Problem:  Body Temperature -  Risk of, Imbalanced  Goal: Ability to maintain a body temperature within defined limits  Outcome: Ongoing  Goal: Will regain or maintain usual level of consciousness  Outcome: Ongoing  Goal: Complications related to the disease process, condition or treatment will be avoided or minimized  Outcome: Ongoing     Problem: Isolation Precautions - Risk of Spread of Infection  Goal: Prevent transmission of infection  Outcome: Ongoing     Problem: Nutrition Deficits  Goal: Optimize nutritional status  Outcome: Ongoing     Problem: Risk for Fluid Volume Deficit  Goal: Maintain normal heart rhythm  Outcome: Ongoing  Goal: Maintain absence of muscle cramping  Outcome: Ongoing  Goal: Maintain normal serum potassium, sodium, calcium, phosphorus, and pH  Outcome: Ongoing     Problem: Loneliness or Risk for Loneliness  Goal: Demonstrate positive use of time alone when socialization is not possible  Outcome: Ongoing     Problem: Fatigue  Goal: Verbalize increase energy and improved vitality  Outcome: Ongoing     Problem: Patient Education: Go to Patient Education Activity  Goal: Patient/Family Education  Outcome: Ongoing     Problem: Musculor/Skeletal Functional Status  Goal: Highest potential functional level  Outcome: Ongoing  Goal: Absence of falls  Outcome: Ongoing

## 2021-08-26 NOTE — PLAN OF CARE
Problem: Gas Exchange - Impaired  Goal: Absence of hypoxia  8/26/2021 1008 by Ammy Mortensen RN  Outcome: Ongoing    Problem: Falls - Risk of:  Goal: Will remain free from falls  Description: Will remain free from falls  8/26/2021 1008 by Ammy Mortensen RN  Outcome: Ongoing    Problem: Risk for Fluid Volume Deficit  Goal: Maintain normal heart rhythm  8/26/2021 1008 by Ammy Mortensen RN  Outcome: Ongoing

## 2021-08-27 LAB
ANION GAP SERPL CALCULATED.3IONS-SCNC: 9 MMOL/L (ref 3–16)
BUN BLDV-MCNC: 29 MG/DL (ref 7–20)
CALCIUM SERPL-MCNC: 8.7 MG/DL (ref 8.3–10.6)
CHLORIDE BLD-SCNC: 104 MMOL/L (ref 99–110)
CO2: 24 MMOL/L (ref 21–32)
CREAT SERPL-MCNC: 1 MG/DL (ref 0.8–1.3)
GFR AFRICAN AMERICAN: >60
GFR NON-AFRICAN AMERICAN: >60
GLUCOSE BLD-MCNC: 278 MG/DL (ref 70–99)
HCT VFR BLD CALC: 42.8 % (ref 40.5–52.5)
HEMOGLOBIN: 14.2 G/DL (ref 13.5–17.5)
MCH RBC QN AUTO: 28.9 PG (ref 26–34)
MCHC RBC AUTO-ENTMCNC: 33.1 G/DL (ref 31–36)
MCV RBC AUTO: 87.4 FL (ref 80–100)
PDW BLD-RTO: 14.4 % (ref 12.4–15.4)
PLATELET # BLD: 308 K/UL (ref 135–450)
PMV BLD AUTO: 7.9 FL (ref 5–10.5)
POTASSIUM SERPL-SCNC: 4.8 MMOL/L (ref 3.5–5.1)
RBC # BLD: 4.9 M/UL (ref 4.2–5.9)
SODIUM BLD-SCNC: 137 MMOL/L (ref 136–145)
WBC # BLD: 13.2 K/UL (ref 4–11)

## 2021-08-27 PROCEDURE — 6370000000 HC RX 637 (ALT 250 FOR IP): Performed by: INTERNAL MEDICINE

## 2021-08-27 PROCEDURE — 2060000000 HC ICU INTERMEDIATE R&B

## 2021-08-27 PROCEDURE — 2500000003 HC RX 250 WO HCPCS: Performed by: INTERNAL MEDICINE

## 2021-08-27 PROCEDURE — 85027 COMPLETE CBC AUTOMATED: CPT

## 2021-08-27 PROCEDURE — 6360000002 HC RX W HCPCS: Performed by: INTERNAL MEDICINE

## 2021-08-27 PROCEDURE — 2700000000 HC OXYGEN THERAPY PER DAY

## 2021-08-27 PROCEDURE — 94660 CPAP INITIATION&MGMT: CPT

## 2021-08-27 PROCEDURE — 80048 BASIC METABOLIC PNL TOTAL CA: CPT

## 2021-08-27 PROCEDURE — 94640 AIRWAY INHALATION TREATMENT: CPT

## 2021-08-27 PROCEDURE — 99233 SBSQ HOSP IP/OBS HIGH 50: CPT | Performed by: INTERNAL MEDICINE

## 2021-08-27 PROCEDURE — 94761 N-INVAS EAR/PLS OXIMETRY MLT: CPT

## 2021-08-27 PROCEDURE — 1200000000 HC SEMI PRIVATE

## 2021-08-27 PROCEDURE — 36415 COLL VENOUS BLD VENIPUNCTURE: CPT

## 2021-08-27 PROCEDURE — 2580000003 HC RX 258: Performed by: INTERNAL MEDICINE

## 2021-08-27 RX ORDER — POTASSIUM CHLORIDE 7.45 MG/ML
10 INJECTION INTRAVENOUS PRN
Status: DISCONTINUED | OUTPATIENT
Start: 2021-08-27 | End: 2021-08-31 | Stop reason: HOSPADM

## 2021-08-27 RX ORDER — HYDRALAZINE HYDROCHLORIDE 20 MG/ML
10 INJECTION INTRAMUSCULAR; INTRAVENOUS EVERY 6 HOURS PRN
Status: DISCONTINUED | OUTPATIENT
Start: 2021-08-27 | End: 2021-08-31 | Stop reason: HOSPADM

## 2021-08-27 RX ORDER — POTASSIUM CHLORIDE 20 MEQ/1
40 TABLET, EXTENDED RELEASE ORAL PRN
Status: DISCONTINUED | OUTPATIENT
Start: 2021-08-27 | End: 2021-08-31 | Stop reason: HOSPADM

## 2021-08-27 RX ORDER — 0.9 % SODIUM CHLORIDE 0.9 %
500 INTRAVENOUS SOLUTION INTRAVENOUS PRN
Status: DISCONTINUED | OUTPATIENT
Start: 2021-08-27 | End: 2021-08-31 | Stop reason: HOSPADM

## 2021-08-27 RX ADMIN — SODIUM CHLORIDE 30 ML: 9 INJECTION, SOLUTION INTRAVENOUS at 09:37

## 2021-08-27 RX ADMIN — AMLODIPINE BESYLATE 10 MG: 5 TABLET ORAL at 09:24

## 2021-08-27 RX ADMIN — DEXAMETHASONE SODIUM PHOSPHATE 20 MG: 4 INJECTION, SOLUTION INTRA-ARTICULAR; INTRALESIONAL; INTRAMUSCULAR; INTRAVENOUS; SOFT TISSUE at 09:38

## 2021-08-27 RX ADMIN — Medication 2 PUFF: at 07:58

## 2021-08-27 RX ADMIN — Medication 2 PUFF: at 11:50

## 2021-08-27 RX ADMIN — MUPIROCIN: 20 OINTMENT TOPICAL at 21:04

## 2021-08-27 RX ADMIN — ENOXAPARIN SODIUM 100 MG: 100 INJECTION SUBCUTANEOUS at 21:03

## 2021-08-27 RX ADMIN — REMDESIVIR 100 MG: 100 INJECTION, POWDER, LYOPHILIZED, FOR SOLUTION INTRAVENOUS at 12:58

## 2021-08-27 RX ADMIN — SODIUM CHLORIDE 30 ML: 9 INJECTION, SOLUTION INTRAVENOUS at 12:57

## 2021-08-27 RX ADMIN — Medication 2 PUFF: at 07:57

## 2021-08-27 RX ADMIN — ENOXAPARIN SODIUM 100 MG: 100 INJECTION SUBCUTANEOUS at 09:25

## 2021-08-27 RX ADMIN — MUPIROCIN: 20 OINTMENT TOPICAL at 09:25

## 2021-08-27 ASSESSMENT — PAIN SCALES - GENERAL
PAINLEVEL_OUTOF10: 0

## 2021-08-27 NOTE — PROGRESS NOTES
Took patient off of BiPAP and placed on 15 Lpm high flow nasal cannula so he could take his inhalers and eat breakfast. His SpO2 is currently 98%, Will continue to titrate as appropriate.

## 2021-08-27 NOTE — PROGRESS NOTES
2000: Shift assessment complete. VSS. Scheduled medications given. Pt on BiPAP with sats between 92&97%. Pt requested to be woken up at 2200 to eat dinner. Denies further needs. Call light within reach. 2200: pt removed BiPAP to ambulate to restroom and eat dinner, HFNC @15L    2315: This RN replaced BiPAP with O2 sats between 92&95%. Pt denies further needs. Call light within reach.

## 2021-08-27 NOTE — PROGRESS NOTES
PULMONARY AND CRITICAL CARE MEDICINE PROGRESS NOTE      SUBJECTIVE: Patient's respiratory status remains stable. Did tolerate BiPAP last night. Currently on 15 L/min of oxygen supplementation saturating in the low to mid 90s. Reports that his breathing has improved. REVIEW OF SYSTEMS   8 point review of system is negative except that mentioned in the subjective portion. MEDICATIONS:      remdesivir IVPB  100 mg IntraVENous Q24H    dexamethasone (DECADRON) IVPB  20 mg IntraVENous Daily    albuterol sulfate HFA  2 puff Inhalation 4x daily    ipratropium  2 puff Inhalation 4x daily    amLODIPine  10 mg Oral Daily    enoxaparin  1 mg/kg Subcutaneous BID    mupirocin   Topical BID       sodium chloride     ALLERGIES:   Allergies as of 08/19/2021    (No Known Allergies)        OBJECTIVE:   height is 5' 10\" (1.778 m) and weight is 220 lb (99.8 kg). His oral temperature is 98 °F (36.7 °C). His blood pressure is 123/52 (abnormal) and his pulse is 76. His respiration is 20 and oxygen saturation is 92%. No intake/output data recorded. PHYSICAL EXAM:  CONSTITUTIONAL: He is a 69y.o.-year-old who appears his stated age. He is alert and oriented x 3 and in no acute distress. CARDIOVASCULAR: S1 S2 RRR. Without murmer  RESPIRATORY & CHEST: Bilateral scattered crackles heard. No wheezing heard. GASTROINTESTINAL & ABDOMEN: Soft, nontender, positive bowel sounds in all quadrants, non-distended, without hepatosplenomegaly. GENITOURINARY: Deferred. MUSCULOSKELETAL: No tenderness to palpation of the axial skeleton. There is no clubbing. No cyanosis. No edema of the lower extremities. SKIN OF BODY: No rash or jaundice. PSYCHIATRIC EVALUATION: Normal affect. Patient answers questions appropriately. HEMATOLOGIC/LYMPHATIC/ IMMUNOLOGIC: No palpable lymphadenopathy. NEUROLOGIC: Alert and oriented x 3. Groslly non-focal. Motor strength is 5+/5 in all muscle groups.  The patient has a normal sensorium globally. LABS:   LABS:  Recent Labs     08/25/21  0959 08/27/21  0609   WBC 7.0 13.2*   HGB 15.5 14.2   HCT 45.2 42.8    308    137   K 4.3 4.8    104   CREATININE 1.0 1.0   BUN 29* 29*   CO2 24 24       Recent Labs     08/25/21  0959 08/27/21  0609   GLUCOSE 117* 278*   CALCIUM 8.8 8.7    137   K 4.3 4.8   CO2 24 24    104   BUN 29* 29*   CREATININE 1.0 1.0       No results for input(s): PHART, RTB5XIJ, PO2ART, IXY4OQM, T4CETKEC, BEART, W5QRNRRZ in the last 72 hours. No results found for: INR, PROTIME  No results found for: AMYLASE   No results found for: LABA1C  No results found for: EAG  No results found for: TSH, Z1DJWOP, K5NDWEW, THYROIDAB, FT3, T4FREE  Lab Results   Component Value Date    CKTOTAL 325 (H) 08/19/2021    TROPONINI <0.01 08/19/2021      Lab Results   Component Value Date    CRP 4.4 08/26/2021      No results found for: BNP   Lab Results   Component Value Date    DDIMER 803 (H) 08/26/2021      Lab Results   Component Value Date    FERRITIN 1,405.0 (H) 08/26/2021      Lab Results   Component Value Date    LACTA 1.9 08/19/2021       IMAGING:   I reviewed the chest x-ray from 8/25/2021 and my interpretation is as follows. Bilateral peripheral lung infiltrates which are unchanged from previous chest imaging. No pleural effusion, pneumothorax seen. Cardiac silhouette is normal limits. IMPRESSION:   Acute hypoxic respiratory failure, stable  Pneumonia due to COVID-19, persistent  Hypercoagulable state associated with COVID-19  Obesity    RECOMMENDATION:   -Patient's oxygen requirements have stabilized. Currently on 15 L/min of high flow saturating in low to mid 90s. -Possibilities for worsening oxygenation include acute PE, active pneumonitis, pulmonary fibrosis, mucous plugging.  -Patient is already on therapeutic Lovenox. Low suspicion for acute PE. -Chest imaging shows stable bilateral lung infiltrates.  Low suspicion for mucus plugging.   -Continue with IV decadron at 20 mg daily.   -Patient has finished with a 5-day course of IV Remdesivir. Can be given another 5-day course of IV Remdesivir. No benefit has been shown for continuing IV Remdesivir for more than 10 days.  -Has already received IV Actemra.  -Patient should be given BiPAP therapy whenever he is sleeping/napping. He seems to be tolerating it well and it will help him maximize his respiratory status.  -Encourage incentive spirometry.  -Sleep in a prone/semiprone position.   -Out of bed in chair. We will follow. John Dong MD  Pulmonary Critical Care and Sleep Medicine  8/27/2021, 9:57 AM    This note was completed using dragon medical speech recognition software. Grammatical errors, random word insertions, pronoun errors and incomplete sentences are occasional consequences of this technology due to software limitations. If there are questions or concerns about the content of this note of information contained within the body of this dictation they should be addressed with the provider for clarification.

## 2021-08-27 NOTE — PROGRESS NOTES
Placed patient on BiPAP at the request of the patient. SpO2 is currently 93% on BiPAP. There is redness on the bridge of his nose so I placed mepilex for comfort and to prevent skin breakdown.

## 2021-08-27 NOTE — PROGRESS NOTES
Patient is resting comfortably on BiPAP with SpO2 95%. There is mepilex on the bridge of his nose because of redness and to prevent skin breakdown.

## 2021-08-27 NOTE — PROGRESS NOTES
Department of Internal Medicine  General Internal Medicine   Progress Note      SUBJECTIVE:  Respiratory distress worsened   History obtained from chart review and the patient  General ROS: positive for  - fatigue and malaise  negative for - chills, fever or night sweats  Psychological ROS: negative  Ophthalmic ROS: negative  Respiratory ROS: positive for - cough, shortness of breath, tachypnea and wheezing  negative for - hemoptysis, stridor or wheezing  Cardiovascular ROS: positive for - dyspnea on exertion and shortness of breath  negative for - chest pain or palpitations  Gastrointestinal ROS: no abdominal pain, change in bowel habits, or black or bloody stools  Genito-Urinary ROS: no dysuria, trouble voiding, or hematuria  Musculoskeletal ROS: negative  Neurological ROS: no TIA or stroke symptoms  Dermatological ROS: negative    OBJECTIVE      Medications      Current Facility-Administered Medications: remdesivir 100 mg in sodium chloride 0.9 % 250 mL IVPB, 100 mg, IntraVENous, Q24H  dexamethasone (DECADRON) 20 mg in sodium chloride 0.9 % IVPB, 20 mg, IntraVENous, Daily  albuterol sulfate  (90 Base) MCG/ACT inhaler 2 puff, 2 puff, Inhalation, 4x daily  ipratropium (ATROVENT HFA) 17 MCG/ACT inhaler 2 puff, 2 puff, Inhalation, 4x daily  amLODIPine (NORVASC) tablet 10 mg, 10 mg, Oral, Daily  enoxaparin (LOVENOX) injection 100 mg, 1 mg/kg, Subcutaneous, BID  0.9 % sodium chloride bolus, 30 mL, IntraVENous, PRN  mupirocin (BACTROBAN) 2 % ointment, , Topical, BID    Physical      Vitals: BP (!) 123/52   Pulse 76   Temp 98 °F (36.7 °C) (Oral)   Resp 18   Ht 5' 10\" (1.778 m)   Wt 220 lb (99.8 kg)   SpO2 97%   BMI 31.57 kg/m²   Temp: Temp: 98 °F (36.7 °C)  Max: Temp  Av.8 °F (36.6 °C)  Min: 97 °F (36.1 °C)  Max: 98.5 °F (36.9 °C)  Respiration range:  Resp  Av.2  Min: 15  Max: 22  Pulse Range:  Pulse  Av.8  Min: 60  Max: 86  Blood pressure range:  Systolic (06HQR), ZNN:967 , Min:121 , Max:137 , Diastolic (80EXV), UND:21, Min:52, Max:71    SpO2  Av %  Min: 90 %  Max: 98 %    Intake/Output Summary (Last 24 hours) at 2021 1210  Last data filed at 2021 2200  Gross per 24 hour   Intake 480 ml   Output 375 ml   Net 105 ml       Vent settings:  Pulse  Av.9  Min: 49  Max: 87  Resp  Av.4  Min: 12  Max: 30  SpO2  Av.7 %  Min: 70 %  Max: 100 %    CONSTITUTIONAL:  fatigued, alert, cooperative, mild distress, appears stated age and normal weight  EYES:  Unremarkable   NECK:  Mild JVD  and supple, symmetrical, trachea midline  BACK:  symmetric and no curvature  LUNGS:  tachypneic, Severe respiratory distress, moderate air exchange, no retractions and crackles diffuse, rhonchi diffuse, wheeze diffuse  CARDIOVASCULAR:  normal apical pulses, regular rate and rhythm, normal S1 and S2, no S3 and no S4  ABDOMEN:  Soft non tender BS =   MUSCULOSKELETAL:  Trace edema   NEUROLOGIC:  Grossly intact   SKIN:  Warm and dry  and no bruising or bleeding    Data      Recent Results (from the past 96 hour(s))   CBC    Collection Time: 21  9:59 AM   Result Value Ref Range    WBC 7.0 4.0 - 11.0 K/uL    RBC 5.18 4.20 - 5.90 M/uL    Hemoglobin 15.5 13.5 - 17.5 g/dL    Hematocrit 45.2 40.5 - 52.5 %    MCV 87.2 80.0 - 100.0 fL    MCH 29.8 26.0 - 34.0 pg    MCHC 34.2 31.0 - 36.0 g/dL    RDW 14.3 12.4 - 15.4 %    Platelets 694 973 - 386 K/uL    MPV 7.8 5.0 - 10.5 fL   Basic metabolic panel    Collection Time: 21  9:59 AM   Result Value Ref Range    Sodium 140 136 - 145 mmol/L    Potassium 4.3 3.5 - 5.1 mmol/L    Chloride 105 99 - 110 mmol/L    CO2 24 21 - 32 mmol/L    Anion Gap 11 3 - 16    Glucose 117 (H) 70 - 99 mg/dL    BUN 29 (H) 7 - 20 mg/dL    CREATININE 1.0 0.8 - 1.3 mg/dL    GFR Non-African American >60 >60    GFR African American >60 >60    Calcium 8.8 8.3 - 10.6 mg/dL   C-Reactive Protein    Collection Time: 21 11:11 AM   Result Value Ref Range    CRP 4.4 0.0 - 5.1 mg/L   D-Dimer, Quantitative    Collection Time: 08/26/21 11:11 AM   Result Value Ref Range    D-Dimer, Quant 803 (H) 0 - 229 ng/mL DDU   Ferritin    Collection Time: 08/26/21 11:11 AM   Result Value Ref Range    Ferritin 1,405.0 (H) 30.0 - 400.0 ng/mL   Basic Metabolic Panel    Collection Time: 08/27/21  6:09 AM   Result Value Ref Range    Sodium 137 136 - 145 mmol/L    Potassium 4.8 3.5 - 5.1 mmol/L    Chloride 104 99 - 110 mmol/L    CO2 24 21 - 32 mmol/L    Anion Gap 9 3 - 16    Glucose 278 (H) 70 - 99 mg/dL    BUN 29 (H) 7 - 20 mg/dL    CREATININE 1.0 0.8 - 1.3 mg/dL    GFR Non-African American >60 >60    GFR African American >60 >60    Calcium 8.7 8.3 - 10.6 mg/dL   CBC    Collection Time: 08/27/21  6:09 AM   Result Value Ref Range    WBC 13.2 (H) 4.0 - 11.0 K/uL    RBC 4.90 4.20 - 5.90 M/uL    Hemoglobin 14.2 13.5 - 17.5 g/dL    Hematocrit 42.8 40.5 - 52.5 %    MCV 87.4 80.0 - 100.0 fL    MCH 28.9 26.0 - 34.0 pg    MCHC 33.1 31.0 - 36.0 g/dL    RDW 14.4 12.4 - 15.4 %    Platelets 867 229 - 553 K/uL    MPV 7.9 5.0 - 10.5 fL       ASSESSMENT AND PLAN     Active Problems:    Pneumonia    Acute respiratory failure with hypoxia (HCC)    COVID-19    Essential hypertension  Resolved Problems:    * No resolved hospital problems.  *     patient needing high flow O2  And noninvasive mechanical ventilation - prognosis guarded

## 2021-08-27 NOTE — PROGRESS NOTES
Department of Internal Medicine  General Internal Medicine   Progress Note      SUBJECTIVE:  Still has high flow requirement   History obtained from chart review and the patient  General ROS: positive for  - fatigue and malaise  negative for - chills, fever or night sweats  Psychological ROS: negative  Ophthalmic ROS: negative  Respiratory ROS: positive for - cough, shortness of breath, tachypnea and wheezing  negative for - hemoptysis, stridor or wheezing  Cardiovascular ROS: positive for - dyspnea on exertion and shortness of breath  negative for - chest pain or palpitations  Gastrointestinal ROS: no abdominal pain, change in bowel habits, or black or bloody stools  Genito-Urinary ROS: no dysuria, trouble voiding, or hematuria  Musculoskeletal ROS: negative  Neurological ROS: no TIA or stroke symptoms  Dermatological ROS: negative    OBJECTIVE      Medications      Current Facility-Administered Medications: remdesivir 100 mg in sodium chloride 0.9 % 250 mL IVPB, 100 mg, IntraVENous, Q24H  dexamethasone (DECADRON) 20 mg in sodium chloride 0.9 % IVPB, 20 mg, IntraVENous, Daily  albuterol sulfate  (90 Base) MCG/ACT inhaler 2 puff, 2 puff, Inhalation, 4x daily  ipratropium (ATROVENT HFA) 17 MCG/ACT inhaler 2 puff, 2 puff, Inhalation, 4x daily  amLODIPine (NORVASC) tablet 10 mg, 10 mg, Oral, Daily  enoxaparin (LOVENOX) injection 100 mg, 1 mg/kg, Subcutaneous, BID  0.9 % sodium chloride bolus, 30 mL, IntraVENous, PRN  mupirocin (BACTROBAN) 2 % ointment, , Topical, BID    Physical      Vitals: BP (!) 123/52   Pulse 76   Temp 98 °F (36.7 °C) (Oral)   Resp 18   Ht 5' 10\" (1.778 m)   Wt 220 lb (99.8 kg)   SpO2 97%   BMI 31.57 kg/m²   Temp: Temp: 98 °F (36.7 °C)  Max: Temp  Av.8 °F (36.6 °C)  Min: 97 °F (36.1 °C)  Max: 98.5 °F (36.9 °C)  Respiration range:  Resp  Av.2  Min: 15  Max: 22  Pulse Range:  Pulse  Av.8  Min: 60  Max: 86  Blood pressure range:  Systolic (49FHC), SFB:808 , Min:121 , RVF:080   , Diastolic (49LEN), YCR:47, Min:52, Max:71    SpO2  Av %  Min: 90 %  Max: 98 %    Intake/Output Summary (Last 24 hours) at 2021 1212  Last data filed at 2021 2200  Gross per 24 hour   Intake 480 ml   Output 375 ml   Net 105 ml       Vent settings:  Pulse  Av.9  Min: 49  Max: 87  Resp  Av.4  Min: 12  Max: 30  SpO2  Av.7 %  Min: 70 %  Max: 100 %    CONSTITUTIONAL:  fatigued, alert, cooperative, mild distress, appears stated age and normal weight  EYES:  Unremarkable   NECK:  Mild JVD  and supple, symmetrical, trachea midline  BACK:  symmetric and no curvature  LUNGS:  tachypneic, Severe respiratory distress, moderate air exchange, no retractions and crackles diffuse, rhonchi diffuse, wheeze diffuse  CARDIOVASCULAR:  normal apical pulses, regular rate and rhythm, normal S1 and S2, no S3 and no S4  ABDOMEN:  Soft non tender BS =   MUSCULOSKELETAL:  Trace edema   NEUROLOGIC:  Grossly intact   SKIN:  Warm and dry  and no bruising or bleeding    Data      Recent Results (from the past 96 hour(s))   CBC    Collection Time: 21  9:59 AM   Result Value Ref Range    WBC 7.0 4.0 - 11.0 K/uL    RBC 5.18 4.20 - 5.90 M/uL    Hemoglobin 15.5 13.5 - 17.5 g/dL    Hematocrit 45.2 40.5 - 52.5 %    MCV 87.2 80.0 - 100.0 fL    MCH 29.8 26.0 - 34.0 pg    MCHC 34.2 31.0 - 36.0 g/dL    RDW 14.3 12.4 - 15.4 %    Platelets 461 002 - 182 K/uL    MPV 7.8 5.0 - 10.5 fL   Basic metabolic panel    Collection Time: 21  9:59 AM   Result Value Ref Range    Sodium 140 136 - 145 mmol/L    Potassium 4.3 3.5 - 5.1 mmol/L    Chloride 105 99 - 110 mmol/L    CO2 24 21 - 32 mmol/L    Anion Gap 11 3 - 16    Glucose 117 (H) 70 - 99 mg/dL    BUN 29 (H) 7 - 20 mg/dL    CREATININE 1.0 0.8 - 1.3 mg/dL    GFR Non-African American >60 >60    GFR African American >60 >60    Calcium 8.8 8.3 - 10.6 mg/dL   C-Reactive Protein    Collection Time: 21 11:11 AM   Result Value Ref Range    CRP 4.4 0.0 - 5.1 mg/L

## 2021-08-28 LAB
ANION GAP SERPL CALCULATED.3IONS-SCNC: 11 MMOL/L (ref 3–16)
BASOPHILS ABSOLUTE: 0 K/UL (ref 0–0.2)
BASOPHILS RELATIVE PERCENT: 0.2 %
BUN BLDV-MCNC: 31 MG/DL (ref 7–20)
CALCIUM SERPL-MCNC: 9.1 MG/DL (ref 8.3–10.6)
CHLORIDE BLD-SCNC: 101 MMOL/L (ref 99–110)
CO2: 27 MMOL/L (ref 21–32)
CREAT SERPL-MCNC: 0.9 MG/DL (ref 0.8–1.3)
EOSINOPHILS ABSOLUTE: 0 K/UL (ref 0–0.6)
EOSINOPHILS RELATIVE PERCENT: 0 %
GFR AFRICAN AMERICAN: >60
GFR NON-AFRICAN AMERICAN: >60
GLUCOSE BLD-MCNC: 187 MG/DL (ref 70–99)
HCT VFR BLD CALC: 46.8 % (ref 40.5–52.5)
HEMOGLOBIN: 15.7 G/DL (ref 13.5–17.5)
LYMPHOCYTES ABSOLUTE: 1.1 K/UL (ref 1–5.1)
LYMPHOCYTES RELATIVE PERCENT: 7.7 %
MCH RBC QN AUTO: 29.4 PG (ref 26–34)
MCHC RBC AUTO-ENTMCNC: 33.5 G/DL (ref 31–36)
MCV RBC AUTO: 87.9 FL (ref 80–100)
MONOCYTES ABSOLUTE: 0.6 K/UL (ref 0–1.3)
MONOCYTES RELATIVE PERCENT: 4.3 %
NEUTROPHILS ABSOLUTE: 12.9 K/UL (ref 1.7–7.7)
NEUTROPHILS RELATIVE PERCENT: 87.8 %
PDW BLD-RTO: 14.5 % (ref 12.4–15.4)
PLATELET # BLD: 288 K/UL (ref 135–450)
PMV BLD AUTO: 8.2 FL (ref 5–10.5)
POTASSIUM SERPL-SCNC: 4.7 MMOL/L (ref 3.5–5.1)
RBC # BLD: 5.32 M/UL (ref 4.2–5.9)
SODIUM BLD-SCNC: 139 MMOL/L (ref 136–145)
WBC # BLD: 14.7 K/UL (ref 4–11)

## 2021-08-28 PROCEDURE — 2060000000 HC ICU INTERMEDIATE R&B

## 2021-08-28 PROCEDURE — 99233 SBSQ HOSP IP/OBS HIGH 50: CPT | Performed by: INTERNAL MEDICINE

## 2021-08-28 PROCEDURE — 2500000003 HC RX 250 WO HCPCS: Performed by: INTERNAL MEDICINE

## 2021-08-28 PROCEDURE — 6370000000 HC RX 637 (ALT 250 FOR IP): Performed by: INTERNAL MEDICINE

## 2021-08-28 PROCEDURE — 80048 BASIC METABOLIC PNL TOTAL CA: CPT

## 2021-08-28 PROCEDURE — 94640 AIRWAY INHALATION TREATMENT: CPT

## 2021-08-28 PROCEDURE — 6360000002 HC RX W HCPCS: Performed by: INTERNAL MEDICINE

## 2021-08-28 PROCEDURE — 2580000003 HC RX 258: Performed by: INTERNAL MEDICINE

## 2021-08-28 PROCEDURE — 1200000000 HC SEMI PRIVATE

## 2021-08-28 PROCEDURE — 85025 COMPLETE CBC W/AUTO DIFF WBC: CPT

## 2021-08-28 PROCEDURE — 36415 COLL VENOUS BLD VENIPUNCTURE: CPT

## 2021-08-28 PROCEDURE — 2700000000 HC OXYGEN THERAPY PER DAY

## 2021-08-28 PROCEDURE — 94660 CPAP INITIATION&MGMT: CPT

## 2021-08-28 PROCEDURE — 94761 N-INVAS EAR/PLS OXIMETRY MLT: CPT

## 2021-08-28 RX ADMIN — AMLODIPINE BESYLATE 10 MG: 5 TABLET ORAL at 09:06

## 2021-08-28 RX ADMIN — Medication 2 PUFF: at 19:21

## 2021-08-28 RX ADMIN — ENOXAPARIN SODIUM 100 MG: 100 INJECTION SUBCUTANEOUS at 09:06

## 2021-08-28 RX ADMIN — Medication 2 PUFF: at 07:52

## 2021-08-28 RX ADMIN — ENOXAPARIN SODIUM 100 MG: 100 INJECTION SUBCUTANEOUS at 20:12

## 2021-08-28 RX ADMIN — Medication 2 PUFF: at 11:48

## 2021-08-28 RX ADMIN — DEXAMETHASONE SODIUM PHOSPHATE 20 MG: 4 INJECTION, SOLUTION INTRA-ARTICULAR; INTRALESIONAL; INTRAMUSCULAR; INTRAVENOUS; SOFT TISSUE at 10:19

## 2021-08-28 RX ADMIN — REMDESIVIR 100 MG: 100 INJECTION, POWDER, LYOPHILIZED, FOR SOLUTION INTRAVENOUS at 09:21

## 2021-08-28 RX ADMIN — MUPIROCIN: 20 OINTMENT TOPICAL at 20:14

## 2021-08-28 RX ADMIN — Medication 2 PUFF: at 16:15

## 2021-08-28 RX ADMIN — MUPIROCIN: 20 OINTMENT TOPICAL at 09:08

## 2021-08-28 RX ADMIN — Medication 2 PUFF: at 19:23

## 2021-08-28 ASSESSMENT — PAIN SCALES - GENERAL
PAINLEVEL_OUTOF10: 0

## 2021-08-28 NOTE — PROGRESS NOTES
Progress Note - Dr. Rubi Bolanos - Internal Medicine  PCP: Fatou Pham MD [address] [place] [address] Day: 9  Code Status: Full Code  Current Diet: ADULT DIET; Regular        CC: follow up on medical issues    Subjective:   Vicenta Marcial is a 69 y.o. male. He admits to problems    feeks fatigued, short of breath  Remains on 15L o2  Appreciate pulm eval  Patient is day 8 of IV Remdesivir. No benefit has been shown for continuing IV Remdesivir for more than 10 days.  -Has already received IV Actemra. He denies chest pain, complains of shortness of breath, denies nausea,  denies emesis. 10 system Review of Systems is reviewed with patient, and pertinent positives are noted in HPI above . Otherwise, Review of systems is negative. I have reviewed the patient's medical and social history in detail and updated the computerized patient record. To recap: He  has a past medical history of Essential hypertension and Pneumonia. . He  has a past surgical history that includes Appendectomy; skin biopsy; and Tonsillectomy. Holly Luna He  reports that he has never smoked. He does not have any smokeless tobacco history on file. He reports that he does not drink alcohol and does not use drugs. .        Active Hospital Problems    Diagnosis Date Noted    Acute respiratory failure with hypoxia (HCC) [J96.01] 08/20/2021    COVID-19 [U07.1] 08/20/2021    Essential hypertension [I10] 08/20/2021    Pneumonia [J18.9] 08/19/2021       Current Facility-Administered Medications: hydrALAZINE (APRESOLINE) injection 10 mg, 10 mg, IntraVENous, Q6H PRN  0.9 % sodium chloride bolus, 500 mL, IntraVENous, PRN  potassium chloride (KLOR-CON M) extended release tablet 40 mEq, 40 mEq, Oral, PRN **OR** potassium bicarb-citric acid (EFFER-K) effervescent tablet 40 mEq, 40 mEq, Oral, PRN **OR** potassium chloride 10 mEq/100 mL IVPB (Peripheral Line), 10 mEq, IntraVENous, PRN  remdesivir 100 mg in sodium chloride 0.9 % 250 mL IVPB, 100 mg, IntraVENous, Q24H  dexamethasone (DECADRON) 20 mg in sodium chloride 0.9 % IVPB, 20 mg, IntraVENous, Daily  albuterol sulfate  (90 Base) MCG/ACT inhaler 2 puff, 2 puff, Inhalation, 4x daily  ipratropium (ATROVENT HFA) 17 MCG/ACT inhaler 2 puff, 2 puff, Inhalation, 4x daily  amLODIPine (NORVASC) tablet 10 mg, 10 mg, Oral, Daily  enoxaparin (LOVENOX) injection 100 mg, 1 mg/kg, Subcutaneous, BID  0.9 % sodium chloride bolus, 30 mL, IntraVENous, PRN  mupirocin (BACTROBAN) 2 % ointment, , Topical, BID         Objective:  /67   Pulse 80   Temp 97.7 °F (36.5 °C) (Oral)   Resp 18   Ht 5' 10\" (1.778 m)   Wt 220 lb (99.8 kg)   SpO2 97%   BMI 31.57 kg/m²      Patient Vitals for the past 24 hrs:   BP Temp Temp src Pulse Resp SpO2   08/28/21 0830 119/67 97.7 °F (36.5 °C) Oral 80 18 97 %   08/28/21 0756 -- -- -- -- -- 94 %   08/28/21 0434 133/72 97.6 °F (36.4 °C) Oral 55 20 94 %   08/28/21 0333 -- -- -- -- 19 94 %   08/27/21 2320 (!) 147/72 98.4 °F (36.9 °C) Oral 57 20 94 %   08/27/21 2312 -- -- -- -- 16 93 %   08/27/21 2223 -- -- -- 53 -- --   08/27/21 2056 (!) 109/59 98 °F (36.7 °C) Oral 59 22 95 %   08/27/21 2019 -- -- -- -- 18 94 %   08/27/21 1700 (!) 121/58 97.9 °F (36.6 °C) Oral 63 20 91 %   08/27/21 1502 -- -- -- -- 20 --   08/27/21 1403 -- -- -- -- 21 --   08/27/21 1152 -- -- -- -- 18 97 %   08/27/21 1150 -- -- -- -- 18 98 %     No data found.         Intake/Output Summary (Last 24 hours) at 8/28/2021 1149  Last data filed at 8/28/2021 0830  Gross per 24 hour   Intake 240 ml   Output --   Net 240 ml         Physical Exam:   /67   Pulse 80   Temp 97.7 °F (36.5 °C) (Oral)   Resp 18   Ht 5' 10\" (1.778 m)   Wt 220 lb (99.8 kg)   SpO2 97%   BMI 31.57 kg/m²   General appearance: alert, appears stated age and cooperative  Head: Normocephalic, without obvious abnormality, atraumatic  Lungs: clear to auscultation bilaterally  Heart: regular rate and rhythm, S1, S2 normal, no murmur, click, rub or gallop  Abdomen: soft, non-tender; bowel sounds normal; no masses,  no organomegaly  Extremities: extremities normal, atraumatic, no cyanosis or edema    Labs:  Lab Results   Component Value Date    WBC 14.7 (H) 08/28/2021    HGB 15.7 08/28/2021    HCT 46.8 08/28/2021     08/28/2021    ALT 32 08/20/2021    AST 39 (H) 08/20/2021     08/28/2021    K 4.7 08/28/2021     08/28/2021    CREATININE 0.9 08/28/2021    BUN 31 (H) 08/28/2021    CO2 27 08/28/2021    PSA 2.18 11/18/2010     Lab Results   Component Value Date    CKTOTAL 325 (H) 08/19/2021    TROPONINI <0.01 08/19/2021       Recent Imaging Results are Reviewed:  XR CHEST PORTABLE    Result Date: 8/25/2021  EXAMINATION: ONE XRAY VIEW OF THE CHEST 8/25/2021 11:43 am COMPARISON: Chest x-ray dated 08/21/2021 HISTORY: ORDERING SYSTEM PROVIDED HISTORY: persistent Hypoxemia TECHNOLOGIST PROVIDED HISTORY: Reason for exam:->persistent Hypoxemia Reason for Exam: persistent Hypoxemia FINDINGS: Persistent bilateral airspace opacities, unchanged from prior study. Cardiac silhouette is enlarged. No pleural effusion or pneumothorax. Persistent bilateral airspace opacities, unchanged from prior study. XR CHEST PORTABLE    Result Date: 8/21/2021  EXAMINATION: ONE XRAY VIEW OF THE CHEST 8/21/2021 1:34 pm COMPARISON: 08/19/2021 HISTORY: ORDERING SYSTEM PROVIDED HISTORY: follow up respiratory failure TECHNOLOGIST PROVIDED HISTORY: Reason for exam:->follow up respiratory failure Reason for Exam: Follow up respiratory failure Acuity: Acute Type of Exam: Initial FINDINGS: The heart size is stable. Bilateral airspace opacities are not significantly changed. No pneumothorax. No pleural effusion. Persistent bilateral airspace opacities suggesting atypical pneumonia     XR CHEST PORTABLE    Result Date: 8/19/2021  EXAMINATION: ONE XRAY VIEW OF THE CHEST 8/19/2021 12:19 pm COMPARISON: None.  HISTORY: ORDERING SYSTEM PROVIDED HISTORY: hypoxia TECHNOLOGIST PROVIDED HISTORY: Reason for exam:->hypoxia Reason for Exam: hypoxia Acuity: Unknown Type of Exam: Unknown FINDINGS: There are scattered pulmonary infiltrates that is worse on the right compared to left. There is no effusion. There is no pneumothorax. The mediastinal structures are unremarkable. The upper abdomen unremarkable. The extrathoracic soft tissues are unremarkable. Scattered bilateral pulmonary infiltrates consistent with pneumonia. CT CHEST PULMONARY EMBOLISM W CONTRAST    Result Date: 8/19/2021  EXAMINATION: CTA OF THE CHEST 8/19/2021 1:31 pm TECHNIQUE: CTA of the chest was performed after the administration of intravenous contrast.  Multiplanar reformatted images are provided for review. MIP images are provided for review. Dose modulation, iterative reconstruction, and/or weight based adjustment of the mA/kV was utilized to reduce the radiation dose to as low as reasonably achievable. COMPARISON: None. HISTORY: ORDERING SYSTEM PROVIDED HISTORY: hypoxia - covid? ?? - PE TECHNOLOGIST PROVIDED HISTORY: Reason for exam:->hypoxia - covid? ?? - PE Decision Support Exception - unselect if not a suspected or confirmed emergency medical condition->Emergency Medical Condition (MA) Reason for Exam: hypoxia - covid? ?? - PE Acuity: Acute Type of Exam: Initial FINDINGS: Pulmonary Arteries: Pulmonary arteries are adequately opacified for evaluation. No evidence of intraluminal filling defect to suggest pulmonary embolism. Main pulmonary artery is normal in caliber. Mediastinum: No evidence of mediastinal lymphadenopathy. The heart and pericardium demonstrate no acute abnormality. There is no acute abnormality of the thoracic aorta. Lungs/pleura: There is scattered ground-glass opacities throughout the lungs bilaterally. There is no effusion. There is no pneumothorax. The tracheobronchial tree is patent. Upper Abdomen: Limited images of the upper abdomen are unremarkable.  Soft Tissues/Bones: No acute bone or soft tissue abnormality. Scattered ground-glass opacities throughout the lungs bilaterally consistent with atypical pneumonia. No evidence for acute or chronic pulmonary embolism. Assessment and Plan: Active Problems:    Pneumonia -Established problem. Stable. Viral in nature  Plan: cont tx - on day 8 remdesivir    Acute respiratory failure with hypoxia ([address] -Established problem. Uncontrolled. On 15L still  Plan: monitor closely, if worsens, may need airvo - transfer to icu    COVID-19 -Established problem. Uncontrolled. Plan: as above    Essential hypertension -Established problem. Stable. 119/67  Plan: Pt home BP meds reviewed and will be continued. IV Hydralazine ordered for control of extremely high blood pressures   Will monitor labs to assess Creat/K for possible complications of medications.      Total critical care time caring for this patient with life threatening illness, including direct patient contact, management of life support systems, review of data including imaging and labs, discussions with other team members and physicians at least 35 minutes today                 Gila Mcdowell MD  8/28/2021

## 2021-08-28 NOTE — PROGRESS NOTES
P Pulmonary and Critical Care    Follow Up Note    Subjective:   CHIEF COMPLAINT / HPI:   Chief Complaint   Patient presents with    Shortness of Breath     Pt endorses shortness of breath and cough for \"a couple weeks. \" Pt is not vaccinated against covid-19. Pt arrived on RA with oxygen saturation 83%. Pt placed on 6L NC with rebound to 94%.  Cough       Interval history: He was originally admitted 8/19/2021 with COVID-19 pneumonia. He has received 2 doses Actemra. He is also on day 8/10 of remdesivir. He remains on Decadron as well. He states that he is starting to feel better. Seems to be tolerating a little less oxygen support. Past Medical History:    Reviewed; no changes    Social History:    Reviewed; no changes    REVIEW OF SYSTEMS:    CONSTITUTIONAL:  negative for fevers and chills  RESPIRATORY:  See HPI  CARDIOVASCULAR:  negative for chest pain, palpitations, edema  GASTROINTESTINAL:  negative for nausea, vomiting, diarrhea, constipation and abdominal pain    Objective:   PHYSICAL EXAM:        VITALS:  BP (!) 116/57   Pulse 66   Temp 98.2 °F (36.8 °C) (Oral)   Resp 18   Ht 5' 10\" (1.778 m)   Wt 220 lb (99.8 kg)   SpO2 92%   BMI 31.57 kg/m²  on 14L NC    24HR INTAKE/OUTPUT:      Intake/Output Summary (Last 24 hours) at 8/28/2021 1259  Last data filed at 8/28/2021 0830  Gross per 24 hour   Intake 240 ml   Output --   Net 240 ml       CONSTITUTIONAL:  awake, alert,  no apparent distress, and appears stated age  LUNGS:  No increased work of breathing and basilar crackles to auscultation. CARDIOVASCULAR: S1 and S2 and no JVD  ABDOMEN:  normal bowel sounds, non-distended and non-tender to palpation  EXT: No edema, no calf tenderness. Pulses are present bilaterally. NEUROLOGIC:  Mental Status Exam:  Level of Alertness:   awake  Orientation:   person, place, time.  Non focal  SKIN:  normal skin color, texture, turgor, no redness, warmth, or swelling at IV sites    DATA:    CBC:  Recent Labs 08/27/21  0609 08/28/21  0824   WBC 13.2* 14.7*   RBC 4.90 5.32   HGB 14.2 15.7   HCT 42.8 46.8    288   MCV 87.4 87.9   MCH 28.9 29.4   MCHC 33.1 33.5   RDW 14.4 14.5      BMP:  Recent Labs     08/27/21  0609 08/28/21  0824    139   K 4.8 4.7    101   CO2 24 27   BUN 29* 31*   CREATININE 1.0 0.9   CALCIUM 8.7 9.1   GLUCOSE 278* 187*      ABG:  No results for input(s): PHART, BFT7EZM, PO2ART, TQB4EIF, K8EWIVKB, BEART in the last 72 hours. Procalcitonin  No results for input(s): PROCAL in the last 72 hours. Radiology Review:  Pertinent images / reports were reviewed as a part of this visit. Assessment:     1. Acute hypoxemic respiratory failure  2. COVID-19 pneumonia  3. Hypercoagulable state    Plan:     I have reviewed laboratories, medical records and images for this visit  I reviewed chest imaging from 8/25/2021 which reveals multifocal airspace disease consistent with COVID-19 pneumonia  Covid PCR is positive  Day 8/10 remdesivir  RP is 4.4. Decrease Decadron to 10 mg daily  Has received 2 doses Actemra  Wean FiO2 as tolerated.

## 2021-08-28 NOTE — FLOWSHEET NOTE
Patient's head to toe assessment complete at this time. Routine VSS. Patient up to use restroom. On 13L HFNC. Respirations are even and unlabored. Pt is awake, alert, and oriented; sitting up and eating dinner. All nightly medications given per MAR. Pt denies pain. No other needs expressed at this time, call light within reach. Oxygen turned up to 15L on HFNC after ambulating to restroom.      Fall precautions in place: bed locked and in lowest position, bed alarm on, 2/4 side rails raised, non-slip socks on, overhead table within reach, patient knows when to appropriately call out for help.        08/27/21 2056   Vital Signs   Temp 98 °F (36.7 °C)   Temp Source Oral   Pulse 59   Heart Rate Source Monitor   Resp 22   BP (!) 109/59   BP Location Left upper arm   MAP (mmHg) 76   Patient Position Semi fowlers   Level of Consciousness Alert (0)   MEWS Score 2   Patient Currently in Pain Denies   Pain Assessment   Pain Assessment 0-10   Pain Level 0   Patient's Stated Pain Goal No pain   Oxygen Therapy   SpO2 95 %   Pulse Oximeter Device Mode Continuous   Pulse Oximeter Device Location Finger   O2 Device High flow nasal cannula   Skin Assessment Redness (see comment/note)   O2 Flow Rate (L/min) 15 L/min

## 2021-08-28 NOTE — PLAN OF CARE
Problem: Falls - Risk of:  Goal: Will remain free from falls  Description: Will remain free from falls  Outcome: Ongoing  Goal: Absence of physical injury  Description: Absence of physical injury  Outcome: Ongoing  Note: Fall precautions in place: bed locked and in lowest position, bed alarm on, 2/4 side rails raised, non-slip socks on, call light and overhead table within reach, patient knows when to appropriately call out for help. Problem: Airway Clearance - Ineffective  Goal: Achieve or maintain patent airway  Outcome: Ongoing     Problem: Gas Exchange - Impaired  Goal: Absence of hypoxia  Outcome: Ongoing  Goal: Promote optimal lung function  Outcome: Ongoing     Problem: Breathing Pattern - Ineffective  Goal: Ability to achieve and maintain a regular respiratory rate  Outcome: Ongoing       Problem:  Body Temperature -  Risk of, Imbalanced  Goal: Ability to maintain a body temperature within defined limits  Outcome: Ongoing  Goal: Will regain or maintain usual level of consciousness  Outcome: Ongoing  Goal: Complications related to the disease process, condition or treatment will be avoided or minimized  Outcome: Ongoing     Problem: Isolation Precautions - Risk of Spread of Infection  Goal: Prevent transmission of infection  Outcome: Ongoing     Problem: Nutrition Deficits  Goal: Optimize nutritional status  Outcome: Ongoing     Problem: Risk for Fluid Volume Deficit  Goal: Maintain normal heart rhythm  Outcome: Ongoing  Goal: Maintain absence of muscle cramping  Outcome: Ongoing  Goal: Maintain normal serum potassium, sodium, calcium, phosphorus, and pH  Outcome: Ongoing     Problem: Loneliness or Risk for Loneliness  Goal: Demonstrate positive use of time alone when socialization is not possible  Outcome: Ongoing     Problem: Fatigue  Goal: Verbalize increase energy and improved vitality  Outcome: Ongoing     Problem: Patient Education: Go to Patient Education Activity  Goal: Patient/Family Education  Outcome: Ongoing     Problem: Musculor/Skeletal Functional Status  Goal: Highest potential functional level  Outcome: Ongoing  Goal: Absence of falls  Outcome: Ongoing

## 2021-08-29 LAB
ANION GAP SERPL CALCULATED.3IONS-SCNC: 9 MMOL/L (ref 3–16)
BASOPHILS ABSOLUTE: 0 K/UL (ref 0–0.2)
BASOPHILS RELATIVE PERCENT: 0.4 %
BUN BLDV-MCNC: 34 MG/DL (ref 7–20)
C-REACTIVE PROTEIN: <3 MG/L (ref 0–5.1)
CALCIUM SERPL-MCNC: 8.8 MG/DL (ref 8.3–10.6)
CHLORIDE BLD-SCNC: 103 MMOL/L (ref 99–110)
CO2: 26 MMOL/L (ref 21–32)
CREAT SERPL-MCNC: 0.9 MG/DL (ref 0.8–1.3)
EOSINOPHILS ABSOLUTE: 0 K/UL (ref 0–0.6)
EOSINOPHILS RELATIVE PERCENT: 0.1 %
GFR AFRICAN AMERICAN: >60
GFR NON-AFRICAN AMERICAN: >60
GLUCOSE BLD-MCNC: 185 MG/DL (ref 70–99)
HCT VFR BLD CALC: 44.9 % (ref 40.5–52.5)
HEMOGLOBIN: 14.9 G/DL (ref 13.5–17.5)
LYMPHOCYTES ABSOLUTE: 1.3 K/UL (ref 1–5.1)
LYMPHOCYTES RELATIVE PERCENT: 9.7 %
MCH RBC QN AUTO: 29.3 PG (ref 26–34)
MCHC RBC AUTO-ENTMCNC: 33.1 G/DL (ref 31–36)
MCV RBC AUTO: 88.4 FL (ref 80–100)
MONOCYTES ABSOLUTE: 0.9 K/UL (ref 0–1.3)
MONOCYTES RELATIVE PERCENT: 6.4 %
NEUTROPHILS ABSOLUTE: 11.2 K/UL (ref 1.7–7.7)
NEUTROPHILS RELATIVE PERCENT: 83.4 %
PDW BLD-RTO: 14.6 % (ref 12.4–15.4)
PLATELET # BLD: 259 K/UL (ref 135–450)
PMV BLD AUTO: 7.5 FL (ref 5–10.5)
POTASSIUM SERPL-SCNC: 4.8 MMOL/L (ref 3.5–5.1)
RBC # BLD: 5.08 M/UL (ref 4.2–5.9)
SODIUM BLD-SCNC: 138 MMOL/L (ref 136–145)
WBC # BLD: 13.4 K/UL (ref 4–11)

## 2021-08-29 PROCEDURE — 6360000002 HC RX W HCPCS: Performed by: INTERNAL MEDICINE

## 2021-08-29 PROCEDURE — 80048 BASIC METABOLIC PNL TOTAL CA: CPT

## 2021-08-29 PROCEDURE — 36415 COLL VENOUS BLD VENIPUNCTURE: CPT

## 2021-08-29 PROCEDURE — 6370000000 HC RX 637 (ALT 250 FOR IP): Performed by: INTERNAL MEDICINE

## 2021-08-29 PROCEDURE — 94761 N-INVAS EAR/PLS OXIMETRY MLT: CPT

## 2021-08-29 PROCEDURE — 2700000000 HC OXYGEN THERAPY PER DAY

## 2021-08-29 PROCEDURE — 2500000003 HC RX 250 WO HCPCS: Performed by: INTERNAL MEDICINE

## 2021-08-29 PROCEDURE — 99233 SBSQ HOSP IP/OBS HIGH 50: CPT | Performed by: INTERNAL MEDICINE

## 2021-08-29 PROCEDURE — 2060000000 HC ICU INTERMEDIATE R&B

## 2021-08-29 PROCEDURE — 94640 AIRWAY INHALATION TREATMENT: CPT

## 2021-08-29 PROCEDURE — 85025 COMPLETE CBC W/AUTO DIFF WBC: CPT

## 2021-08-29 PROCEDURE — 2580000003 HC RX 258: Performed by: INTERNAL MEDICINE

## 2021-08-29 PROCEDURE — 86140 C-REACTIVE PROTEIN: CPT

## 2021-08-29 RX ADMIN — REMDESIVIR 100 MG: 100 INJECTION, POWDER, LYOPHILIZED, FOR SOLUTION INTRAVENOUS at 09:45

## 2021-08-29 RX ADMIN — DEXAMETHASONE SODIUM PHOSPHATE 20 MG: 4 INJECTION, SOLUTION INTRA-ARTICULAR; INTRALESIONAL; INTRAMUSCULAR; INTRAVENOUS; SOFT TISSUE at 11:27

## 2021-08-29 RX ADMIN — Medication 2 PUFF: at 16:08

## 2021-08-29 RX ADMIN — Medication 2 PUFF: at 08:03

## 2021-08-29 RX ADMIN — AMLODIPINE BESYLATE 10 MG: 5 TABLET ORAL at 09:41

## 2021-08-29 RX ADMIN — ENOXAPARIN SODIUM 100 MG: 100 INJECTION SUBCUTANEOUS at 21:08

## 2021-08-29 RX ADMIN — Medication 2 PUFF: at 20:40

## 2021-08-29 RX ADMIN — MUPIROCIN: 20 OINTMENT TOPICAL at 09:47

## 2021-08-29 RX ADMIN — MUPIROCIN: 20 OINTMENT TOPICAL at 21:08

## 2021-08-29 RX ADMIN — Medication 2 PUFF: at 11:25

## 2021-08-29 RX ADMIN — ENOXAPARIN SODIUM 100 MG: 100 INJECTION SUBCUTANEOUS at 09:41

## 2021-08-29 ASSESSMENT — PAIN SCALES - GENERAL
PAINLEVEL_OUTOF10: 0
PAINLEVEL_OUTOF10: 0

## 2021-08-29 NOTE — PROGRESS NOTES
Progress Note - Dr. Eula Simmonds - Internal Medicine  PCP: Etelvina Bass  [place] [id number] / [facility] Day: 10  Code Status: Full Code  Current Diet: ADULT DIET; Regular        CC: follow up on medical issues    Subjective:   Swati Zimmerman is a 69 y.o. male. He admits to problems    feeks fatigued, short of breath  Remains on 13-15L o2 - high flow  Patient is day 9 of IV Remdesivir. No benefit has been shown for continuing IV Remdesivir for more than 10 days.  -Has already received IV Actemra. Case d/w clinical pharmacy    He denies chest pain, complains of shortness of breath, denies nausea,  denies emesis. 10 system Review of Systems is reviewed with patient, and pertinent positives are noted in HPI above . Otherwise, Review of systems is negative. I have reviewed the patient's medical and social history in detail and updated the computerized patient record. To recap: He  has a past medical history of Essential hypertension and Pneumonia. . He  has a past surgical history that includes Appendectomy; skin biopsy; and Tonsillectomy. [place] He  reports that he has never smoked. He does not have any smokeless tobacco history on file. He reports that he does not drink alcohol and does not use drugs. .        Active Hospital Problems    Diagnosis Date Noted    Acute respiratory failure with hypoxia (HCC) [J96.01] 08/20/2021    COVID-19 [U07.1] 08/20/2021    Essential hypertension [I10] 08/20/2021    Pneumonia [J18.9] 08/19/2021       Current Facility-Administered Medications: hydrALAZINE (APRESOLINE) injection 10 mg, 10 mg, IntraVENous, Q6H PRN  0.9 % sodium chloride bolus, 500 mL, IntraVENous, PRN  potassium chloride (KLOR-CON M) extended release tablet 40 mEq, 40 mEq, Oral, PRN **OR** potassium bicarb-citric acid (EFFER-K) effervescent tablet 40 mEq, 40 mEq, Oral, PRN **OR** potassium chloride 10 mEq/100 mL IVPB (Peripheral Line), 10 mEq, IntraVENous, PRN  remdesivir 100 mg in sodium chloride 0.9 % 250 mL IVPB, 100 mg, IntraVENous, Q24H  dexamethasone (DECADRON) 20 mg in sodium chloride 0.9 % IVPB, 20 mg, IntraVENous, Daily  albuterol sulfate  (90 Base) MCG/ACT inhaler 2 puff, 2 puff, Inhalation, 4x daily  ipratropium (ATROVENT HFA) 17 MCG/ACT inhaler 2 puff, 2 puff, Inhalation, 4x daily  amLODIPine (NORVASC) tablet 10 mg, 10 mg, Oral, Daily  enoxaparin (LOVENOX) injection 100 mg, 1 mg/kg, Subcutaneous, BID  0.9 % sodium chloride bolus, 30 mL, IntraVENous, PRN  mupirocin (BACTROBAN) 2 % ointment, , Topical, BID         Objective:  BP (!) 146/87   Pulse 50   Temp 97.7 °F (36.5 °C) (Axillary)   Resp 18   Ht 5' 10\" (1.778 m)   Wt 220 lb (99.8 kg)   SpO2 95%   BMI 31.57 kg/m²      Patient Vitals for the past 24 hrs:   BP Temp Temp src Pulse Resp SpO2   08/29/21 0759 -- -- -- -- 18 95 %   08/29/21 0533 (!) 146/87 97.7 °F (36.5 °C) Axillary 50 18 95 %   08/29/21 0313 -- -- -- -- 17 96 %   08/29/21 0004 137/72 97.9 °F (36.6 °C) Axillary 53 18 100 %   08/28/21 2304 -- -- -- -- -- 100 %   08/28/21 2300 -- -- -- -- 18 100 %   08/28/21 2011 135/71 97.7 °F (36.5 °C) Bladder 63 12 96 %   08/28/21 1927 -- -- -- -- 23 94 %   08/28/21 1921 -- -- -- -- -- 98 %   08/28/21 1616 -- -- -- -- -- 95 %   08/28/21 1600 (!) 143/83 97.9 °F (36.6 °C) Oral 56 16 95 %   08/28/21 1215 (!) 116/57 98.2 °F (36.8 °C) Oral 66 18 92 %   08/28/21 1153 -- -- -- -- -- 92 %   08/28/21 0830 119/67 97.7 °F (36.5 °C) Oral 80 18 97 %     No data found.         Intake/Output Summary (Last 24 hours) at 8/29/2021 0805  Last data filed at 8/29/2021 0530  Gross per 24 hour   Intake 240 ml   Output 825 ml   Net -585 ml         Physical Exam:   BP (!) 146/87   Pulse 50   Temp 97.7 °F (36.5 °C) (Axillary)   Resp 18   Ht 5' 10\" (1.778 m)   Wt 220 lb (99.8 kg)   SpO2 95%   BMI 31.57 kg/m²   General appearance: alert, appears stated age and cooperative  Head: Normocephalic, without obvious abnormality, atraumatic  Lungs: clear to auscultation bilaterally  Heart: regular rate and rhythm, S1, S2 normal, no murmur, click, rub or gallop  Abdomen: soft, non-tender; bowel sounds normal; no masses,  no organomegaly  Extremities: extremities normal, atraumatic, no cyanosis or edema    Labs:  Lab Results   Component Value Date    WBC 13.4 (H) 08/29/2021    HGB 14.9 08/29/2021    HCT 44.9 08/29/2021     08/29/2021    ALT 32 08/20/2021    AST 39 (H) 08/20/2021     08/29/2021    K 4.8 08/29/2021     08/29/2021    CREATININE 0.9 08/29/2021    BUN 34 (H) 08/29/2021    CO2 26 08/29/2021    PSA 2.18 11/18/2010     Lab Results   Component Value Date    CKTOTAL 325 (H) 08/19/2021    TROPONINI <0.01 08/19/2021       Recent Imaging Results are Reviewed:  XR CHEST PORTABLE    Result Date: 8/25/2021  EXAMINATION: ONE XRAY VIEW OF THE CHEST 8/25/2021 11:43 am COMPARISON: Chest x-ray dated 08/21/2021 HISTORY: ORDERING SYSTEM PROVIDED HISTORY: persistent Hypoxemia TECHNOLOGIST PROVIDED HISTORY: Reason for exam:->persistent Hypoxemia Reason for Exam: persistent Hypoxemia FINDINGS: Persistent bilateral airspace opacities, unchanged from prior study. Cardiac silhouette is enlarged. No pleural effusion or pneumothorax. Persistent bilateral airspace opacities, unchanged from prior study. XR CHEST PORTABLE    Result Date: 8/21/2021  EXAMINATION: ONE XRAY VIEW OF THE CHEST 8/21/2021 1:34 pm COMPARISON: 08/19/2021 HISTORY: ORDERING SYSTEM PROVIDED HISTORY: follow up respiratory failure TECHNOLOGIST PROVIDED HISTORY: Reason for exam:->follow up respiratory failure Reason for Exam: Follow up respiratory failure Acuity: Acute Type of Exam: Initial FINDINGS: The heart size is stable. Bilateral airspace opacities are not significantly changed. No pneumothorax. No pleural effusion.      Persistent bilateral airspace opacities suggesting atypical pneumonia     XR CHEST PORTABLE    Result Date: 8/19/2021  EXAMINATION: ONE XRAY VIEW OF THE CHEST 8/19/2021 12:19 pm COMPARISON: None. HISTORY: ORDERING SYSTEM PROVIDED HISTORY: hypoxia TECHNOLOGIST PROVIDED HISTORY: Reason for exam:->hypoxia Reason for Exam: hypoxia Acuity: Unknown Type of Exam: Unknown FINDINGS: There are scattered pulmonary infiltrates that is worse on the right compared to left. There is no effusion. There is no pneumothorax. The mediastinal structures are unremarkable. The upper abdomen unremarkable. The extrathoracic soft tissues are unremarkable. Scattered bilateral pulmonary infiltrates consistent with pneumonia. CT CHEST PULMONARY EMBOLISM W CONTRAST    Result Date: 8/19/2021  EXAMINATION: CTA OF THE CHEST 8/19/2021 1:31 pm TECHNIQUE: CTA of the chest was performed after the administration of intravenous contrast.  Multiplanar reformatted images are provided for review. MIP images are provided for review. Dose modulation, iterative reconstruction, and/or weight based adjustment of the mA/kV was utilized to reduce the radiation dose to as low as reasonably achievable. COMPARISON: None. HISTORY: ORDERING SYSTEM PROVIDED HISTORY: hypoxia - covid? ?? - PE TECHNOLOGIST PROVIDED HISTORY: Reason for exam:->hypoxia - covid? ?? - PE Decision Support Exception - unselect if not a suspected or confirmed emergency medical condition->Emergency Medical Condition (MA) Reason for Exam: hypoxia - covid? ?? - PE Acuity: Acute Type of Exam: Initial FINDINGS: Pulmonary Arteries: Pulmonary arteries are adequately opacified for evaluation. No evidence of intraluminal filling defect to suggest pulmonary embolism. Main pulmonary artery is normal in caliber. Mediastinum: No evidence of mediastinal lymphadenopathy. The heart and pericardium demonstrate no acute abnormality. There is no acute abnormality of the thoracic aorta. Lungs/pleura: There is scattered ground-glass opacities throughout the lungs bilaterally. There is no effusion. There is no pneumothorax.   The tracheobronchial tree is patent. Upper Abdomen: Limited images of the upper abdomen are unremarkable. Soft Tissues/Bones: No acute bone or soft tissue abnormality. Scattered ground-glass opacities throughout the lungs bilaterally consistent with atypical pneumonia. No evidence for acute or chronic pulmonary embolism. Assessment and Plan: Active Problems:    Pneumonia -Established problem. Stable. Viral in nature  Plan: cont tx - on day 9 remdesivir; already actemra x 2    Acute respiratory failure with hypoxia ([address] -Established problem. Uncontrolled. On 13-15L high flow still  Plan: monitor closely, if worsens, may need airvo - transfer to icu    COVID-19 -Established problem. Uncontrolled. Plan: as above    Essential hypertension -Established problem. A little high. 146/87  Plan: cont same meds.  Iv hydralazine ordered prn      Total critical care time caring for this patient with life threatening illness, including direct patient contact, management of life support systems, review of data including imaging and labs, discussions with other team members and physicians at least 31 minutes today                   Antonio Barajas MD  8/29/2021

## 2021-08-29 NOTE — PROGRESS NOTES
P Pulmonary and Critical Care    Follow Up Note    Subjective:   CHIEF COMPLAINT / HPI:   Chief Complaint   Patient presents with    Shortness of Breath     Pt endorses shortness of breath and cough for \"a couple weeks. \" Pt is not vaccinated against covid-19. Pt arrived on RA with oxygen saturation 83%. Pt placed on 6L NC with rebound to 94%.  Cough       Interval history: Patient continues to improve. Wearing BiPAP at night. His wife thinks that he has obstructive sleep apnea. She has witnessed apneas in the middle of the night. He is interested in having a sleep study but wants to have it done through the [employer]. Past Medical History:    Reviewed; no changes    Social History:    Reviewed; no changes    REVIEW OF SYSTEMS:    CONSTITUTIONAL:  negative for fevers and chills  RESPIRATORY:  See HPI  CARDIOVASCULAR:  negative for chest pain, palpitations, edema  GASTROINTESTINAL:  negative for nausea, vomiting, diarrhea, constipation and abdominal pain    Objective:   PHYSICAL EXAM:        VITALS:  /67   Pulse 82   Temp 98 °F (36.7 °C) (Oral)   Resp 16   Ht 5' 10\" (1.778 m)   Wt 220 lb (99.8 kg)   SpO2 92%   BMI 31.57 kg/m²  on 14L NC    24HR INTAKE/OUTPUT:      Intake/Output Summary (Last 24 hours) at 8/29/2021 1215  Last data filed at 8/29/2021 1214  Gross per 24 hour   Intake --   Output 1225 ml   Net -1225 ml       CONSTITUTIONAL:  awake, alert,  no apparent distress, and appears stated age  LUNGS:  No increased work of breathing and basilar crackles to auscultation. CARDIOVASCULAR: S1 and S2 and no JVD  ABDOMEN:  normal bowel sounds, non-distended and non-tender to palpation  EXT: No edema, no calf tenderness. Pulses are present bilaterally. NEUROLOGIC:  Mental Status Exam:  Level of Alertness:   awake  Orientation:   person, place, time.  Non focal  SKIN:  normal skin color, texture, turgor, no redness, warmth, or swelling at IV sites    DATA:    CBC:  Recent Labs     08/27/21  0609 08/28/21  0824 08/29/21  0707   WBC 13.2* 14.7* 13.4*   RBC 4.90 5.32 5.08   HGB 14.2 15.7 14.9   HCT 42.8 46.8 44.9    288 259   MCV 87.4 87.9 88.4   MCH 28.9 29.4 29.3   MCHC 33.1 33.5 33.1   RDW 14.4 14.5 14.6      BMP:  Recent Labs     08/27/21  0609 08/28/21  0824 08/29/21  0707    139 138   K 4.8 4.7 4.8    101 103   CO2 24 27 26   BUN 29* 31* 34*   CREATININE 1.0 0.9 0.9   CALCIUM 8.7 9.1 8.8   GLUCOSE 278* 187* 185*      ABG:  No results for input(s): PHART, RJN0QOA, PO2ART, QYD4DOC, S1VDWUCO, BEART in the last 72 hours. Procalcitonin  No results for input(s): PROCAL in the last 72 hours. Radiology Review:  Pertinent images / reports were reviewed as a part of this visit. Assessment:     1. Acute hypoxemic respiratory failure  2. COVID-19 pneumonia  3. Hypercoagulable state  4. Obstructive sleep apnea    Plan:     I have reviewed laboratories, medical records and images for this visit  I reviewed chest imaging from 8/25/2021 which reveals multifocal airspace disease consistent with COVID-19 pneumonia  Covid PCR is positive  Day 9/10 remdesivir  CRP is 4.4. Decadron is 10 mg daily  Has received 2 doses Actemra  He is tolerating 9 L/min HFNC. Maintaining saturations in the mid 90s  Once he is tolerating 4 L/min HFNC, we can consider discharge home with supplemental oxygen  Has a good history supportive of obstructive sleep apnea  He would like to have his sleep study through the [facility].   Would recommend outpatient sleep study  Continue BiPAP with sleep at night in the hospital.

## 2021-08-29 NOTE — PROGRESS NOTES
Department of Internal Medicine  General Internal Medicine   Progress Note      SUBJECTIVE:  Still moderate dyspnea and increased lethargy   History obtained from chart review and the patient  General ROS: positive for  - fatigue and malaise  negative for - chills, fever or night sweats  Psychological ROS: negative  Ophthalmic ROS: negative  Respiratory ROS: positive for - cough, shortness of breath, tachypnea and wheezing  negative for - hemoptysis, stridor or wheezing  Cardiovascular ROS: positive for - dyspnea on exertion and shortness of breath  negative for - chest pain or palpitations  Gastrointestinal ROS: no abdominal pain, change in bowel habits, or black or bloody stools  Genito-Urinary ROS: no dysuria, trouble voiding, or hematuria  Musculoskeletal ROS: negative  Neurological ROS: no TIA or stroke symptoms  Dermatological ROS: negative    OBJECTIVE      Medications      Current Facility-Administered Medications: hydrALAZINE (APRESOLINE) injection 10 mg, 10 mg, IntraVENous, Q6H PRN  0.9 % sodium chloride bolus, 500 mL, IntraVENous, PRN  potassium chloride (KLOR-CON M) extended release tablet 40 mEq, 40 mEq, Oral, PRN **OR** potassium bicarb-citric acid (EFFER-K) effervescent tablet 40 mEq, 40 mEq, Oral, PRN **OR** potassium chloride 10 mEq/100 mL IVPB (Peripheral Line), 10 mEq, IntraVENous, PRN  remdesivir 100 mg in sodium chloride 0.9 % 250 mL IVPB, 100 mg, IntraVENous, Q24H  dexamethasone (DECADRON) 20 mg in sodium chloride 0.9 % IVPB, 20 mg, IntraVENous, Daily  albuterol sulfate  (90 Base) MCG/ACT inhaler 2 puff, 2 puff, Inhalation, 4x daily  ipratropium (ATROVENT HFA) 17 MCG/ACT inhaler 2 puff, 2 puff, Inhalation, 4x daily  amLODIPine (NORVASC) tablet 10 mg, 10 mg, Oral, Daily  enoxaparin (LOVENOX) injection 100 mg, 1 mg/kg, Subcutaneous, BID  0.9 % sodium chloride bolus, 30 mL, IntraVENous, PRN  mupirocin (BACTROBAN) 2 % ointment, , Topical, BID    Physical      Vitals: /72   Pulse 53 Temp 97.9 °F (36.6 °C) (Axillary)   Resp 18   Ht 5' 10\" (1.778 m)   Wt 220 lb (99.8 kg)   SpO2 100%   BMI 31.57 kg/m²   Temp: Temp: 97.9 °F (36.6 °C)  Max: Temp  Av.8 °F (36.6 °C)  Min: 97.6 °F (36.4 °C)  Max: 98.2 °F (36.8 °C)  Respiration range:  Resp  Av  Min: 12  Max: 23  Pulse Range:  Pulse  Av.2  Min: 53  Max: 80  Blood pressure range:  Systolic (86KMH), NFS:841 , Min:116 , PTF:580   , Diastolic (70CAX), IKA:01, Min:57, Max:83    SpO2  Av.8 %  Min: 92 %  Max: 100 %    Intake/Output Summary (Last 24 hours) at 2021 0017  Last data filed at 2021 1911  Gross per 24 hour   Intake 240 ml   Output 525 ml   Net -285 ml       Vent settings:  Pulse  Av  Min: 49  Max: 87  Resp  Av.3  Min: 12  Max: 30  SpO2  Av %  Min: 70 %  Max: 100 %    CONSTITUTIONAL:  fatigued, alert, cooperative, mild distress, appears stated age and normal weight  EYES:  Unremarkable   NECK:  Mild JVD  and supple, symmetrical, trachea midline  BACK:  symmetric and no curvature  LUNGS:  tachypneic, Severe respiratory distress, moderate air exchange, no retractions and crackles diffuse, rhonchi diffuse, wheeze diffuse  CARDIOVASCULAR:  normal apical pulses, regular rate and rhythm, normal S1 and S2, no S3 and no S4  ABDOMEN:  Soft non tender BS =   MUSCULOSKELETAL:  Trace edema   NEUROLOGIC:  Grossly intact   SKIN:  Warm and dry  and no bruising or bleeding    Data      Recent Results (from the past 96 hour(s))   CBC    Collection Time: 21  9:59 AM   Result Value Ref Range    WBC 7.0 4.0 - 11.0 K/uL    RBC 5.18 4.20 - 5.90 M/uL    Hemoglobin 15.5 13.5 - 17.5 g/dL    Hematocrit 45.2 40.5 - 52.5 %    MCV 87.2 80.0 - 100.0 fL    MCH 29.8 26.0 - 34.0 pg    MCHC 34.2 31.0 - 36.0 g/dL    RDW 14.3 12.4 - 15.4 %    Platelets 787 106 - 851 K/uL    MPV 7.8 5.0 - 10.5 fL   Basic metabolic panel    Collection Time: 21  9:59 AM   Result Value Ref Range    Sodium 140 136 - 145 mmol/L    Potassium 4.3 3.5 WBC 14.7 (H) 4.0 - 11.0 K/uL    RBC 5.32 4.20 - 5.90 M/uL    Hemoglobin 15.7 13.5 - 17.5 g/dL    Hematocrit 46.8 40.5 - 52.5 %    MCV 87.9 80.0 - 100.0 fL    MCH 29.4 26.0 - 34.0 pg    MCHC 33.5 31.0 - 36.0 g/dL    RDW 14.5 12.4 - 15.4 %    Platelets 888 319 - 074 K/uL    MPV 8.2 5.0 - 10.5 fL    Neutrophils % 87.8 %    Lymphocytes % 7.7 %    Monocytes % 4.3 %    Eosinophils % 0.0 %    Basophils % 0.2 %    Neutrophils Absolute 12.9 (H) 1.7 - 7.7 K/uL    Lymphocytes Absolute 1.1 1.0 - 5.1 K/uL    Monocytes Absolute 0.6 0.0 - 1.3 K/uL    Eosinophils Absolute 0.0 0.0 - 0.6 K/uL    Basophils Absolute 0.0 0.0 - 0.2 K/uL       ASSESSMENT AND PLAN     Active Problems:    Pneumonia    Acute respiratory failure with hypoxia (HCC)    COVID-19    Essential hypertension  Resolved Problems:    * No resolved hospital problems.  *     ct Bipap  And high flow O2    DVt prophylaxis   Prognosis guarded

## 2021-08-30 LAB
ANION GAP SERPL CALCULATED.3IONS-SCNC: 10 MMOL/L (ref 3–16)
BASOPHILS ABSOLUTE: 0 K/UL (ref 0–0.2)
BASOPHILS RELATIVE PERCENT: 0.2 %
BUN BLDV-MCNC: 31 MG/DL (ref 7–20)
CALCIUM SERPL-MCNC: 8.7 MG/DL (ref 8.3–10.6)
CHLORIDE BLD-SCNC: 100 MMOL/L (ref 99–110)
CO2: 24 MMOL/L (ref 21–32)
CREAT SERPL-MCNC: 0.9 MG/DL (ref 0.8–1.3)
EOSINOPHILS ABSOLUTE: 0 K/UL (ref 0–0.6)
EOSINOPHILS RELATIVE PERCENT: 0 %
GFR AFRICAN AMERICAN: >60
GFR NON-AFRICAN AMERICAN: >60
GLUCOSE BLD-MCNC: 225 MG/DL (ref 70–99)
HCT VFR BLD CALC: 44 % (ref 40.5–52.5)
HEMOGLOBIN: 14.7 G/DL (ref 13.5–17.5)
LYMPHOCYTES ABSOLUTE: 1 K/UL (ref 1–5.1)
LYMPHOCYTES RELATIVE PERCENT: 8 %
MCH RBC QN AUTO: 29.3 PG (ref 26–34)
MCHC RBC AUTO-ENTMCNC: 33.4 G/DL (ref 31–36)
MCV RBC AUTO: 87.8 FL (ref 80–100)
MONOCYTES ABSOLUTE: 0.6 K/UL (ref 0–1.3)
MONOCYTES RELATIVE PERCENT: 5 %
NEUTROPHILS ABSOLUTE: 10.5 K/UL (ref 1.7–7.7)
NEUTROPHILS RELATIVE PERCENT: 86.8 %
PDW BLD-RTO: 14.5 % (ref 12.4–15.4)
PLATELET # BLD: 201 K/UL (ref 135–450)
PMV BLD AUTO: 8.4 FL (ref 5–10.5)
POTASSIUM SERPL-SCNC: 4.9 MMOL/L (ref 3.5–5.1)
RBC # BLD: 5.02 M/UL (ref 4.2–5.9)
SODIUM BLD-SCNC: 134 MMOL/L (ref 136–145)
WBC # BLD: 12.1 K/UL (ref 4–11)

## 2021-08-30 PROCEDURE — 2580000003 HC RX 258: Performed by: INTERNAL MEDICINE

## 2021-08-30 PROCEDURE — 6370000000 HC RX 637 (ALT 250 FOR IP): Performed by: INTERNAL MEDICINE

## 2021-08-30 PROCEDURE — 2060000000 HC ICU INTERMEDIATE R&B

## 2021-08-30 PROCEDURE — 80048 BASIC METABOLIC PNL TOTAL CA: CPT

## 2021-08-30 PROCEDURE — 94761 N-INVAS EAR/PLS OXIMETRY MLT: CPT

## 2021-08-30 PROCEDURE — 2700000000 HC OXYGEN THERAPY PER DAY

## 2021-08-30 PROCEDURE — 36415 COLL VENOUS BLD VENIPUNCTURE: CPT

## 2021-08-30 PROCEDURE — 94640 AIRWAY INHALATION TREATMENT: CPT

## 2021-08-30 PROCEDURE — 85025 COMPLETE CBC W/AUTO DIFF WBC: CPT

## 2021-08-30 PROCEDURE — 99232 SBSQ HOSP IP/OBS MODERATE 35: CPT | Performed by: INTERNAL MEDICINE

## 2021-08-30 PROCEDURE — 94660 CPAP INITIATION&MGMT: CPT

## 2021-08-30 PROCEDURE — 6360000002 HC RX W HCPCS: Performed by: INTERNAL MEDICINE

## 2021-08-30 RX ADMIN — Medication 2 PUFF: at 12:22

## 2021-08-30 RX ADMIN — DEXAMETHASONE SODIUM PHOSPHATE 20 MG: 4 INJECTION, SOLUTION INTRA-ARTICULAR; INTRALESIONAL; INTRAMUSCULAR; INTRAVENOUS; SOFT TISSUE at 08:49

## 2021-08-30 RX ADMIN — MUPIROCIN: 20 OINTMENT TOPICAL at 08:44

## 2021-08-30 RX ADMIN — Medication 2 PUFF: at 16:23

## 2021-08-30 RX ADMIN — MUPIROCIN: 20 OINTMENT TOPICAL at 20:18

## 2021-08-30 RX ADMIN — ENOXAPARIN SODIUM 100 MG: 100 INJECTION SUBCUTANEOUS at 20:18

## 2021-08-30 RX ADMIN — Medication 2 PUFF: at 08:09

## 2021-08-30 RX ADMIN — Medication 2 PUFF: at 19:31

## 2021-08-30 RX ADMIN — Medication 2 PUFF: at 08:08

## 2021-08-30 RX ADMIN — AMLODIPINE BESYLATE 10 MG: 5 TABLET ORAL at 07:57

## 2021-08-30 RX ADMIN — ENOXAPARIN SODIUM 100 MG: 100 INJECTION SUBCUTANEOUS at 07:58

## 2021-08-30 RX ADMIN — Medication 2 PUFF: at 19:29

## 2021-08-30 ASSESSMENT — PAIN SCALES - GENERAL
PAINLEVEL_OUTOF10: 0
PAINLEVEL_OUTOF10: 0

## 2021-08-30 NOTE — PLAN OF CARE
Problem: Falls - Risk of:  Goal: Will remain free from falls  Description: Will remain free from falls  8/30/2021 1236 by Sebastian Barreto RN  Outcome: Ongoing  8/29/2021 2314 by La Shelby RN  Outcome: Ongoing  Goal: Absence of physical injury  Description: Absence of physical injury  8/30/2021 1236 by Sebastian Barreto RN  Outcome: Ongoing  8/29/2021 2314 by La Shelby RN  Outcome: Ongoing     Problem: Airway Clearance - Ineffective  Goal: Achieve or maintain patent airway  Outcome: Ongoing     Problem: Gas Exchange - Impaired  Goal: Absence of hypoxia  8/30/2021 1236 by Sebastian Barreto RN  Outcome: Ongoing  8/29/2021 2314 by La Shelby RN  Outcome: Ongoing  Goal: Promote optimal lung function  Outcome: Ongoing     Problem: Breathing Pattern - Ineffective  Goal: Ability to achieve and maintain a regular respiratory rate  8/30/2021 1236 by Sebastian Barreto RN  Outcome: Ongoing  8/29/2021 2314 by La Shelby RN  Outcome: Ongoing     Problem:  Body Temperature -  Risk of, Imbalanced  Goal: Ability to maintain a body temperature within defined limits  8/30/2021 1236 by Sebastian Barreto RN  Outcome: Ongoing  8/29/2021 2314 by La Shelby RN  Outcome: Ongoing  Goal: Will regain or maintain usual level of consciousness  Outcome: Ongoing  Goal: Complications related to the disease process, condition or treatment will be avoided or minimized  Outcome: Ongoing     Problem: Isolation Precautions - Risk of Spread of Infection  Goal: Prevent transmission of infection  8/30/2021 1236 by Sebastian Barreto RN  Outcome: Ongoing  8/29/2021 2314 by La Shelby RN  Outcome: Ongoing     Problem: Nutrition Deficits  Goal: Optimize nutritional status  Outcome: Ongoing     Problem: Risk for Fluid Volume Deficit  Goal: Maintain normal heart rhythm  Outcome: Ongoing  Goal: Maintain absence of muscle cramping  Outcome: Ongoing  Goal: Maintain normal serum potassium, sodium, calcium, phosphorus, and pH  Outcome: Ongoing     Problem: Loneliness or Risk for Loneliness  Goal: Demonstrate positive use of time alone when socialization is not possible  Outcome: Ongoing     Problem: Fatigue  Goal: Verbalize increase energy and improved vitality  Outcome: Ongoing     Problem: Patient Education: Go to Patient Education Activity  Goal: Patient/Family Education  Outcome: Ongoing     Problem: Musculor/Skeletal Functional Status  Goal: Highest potential functional level  Outcome: Ongoing  Goal: Absence of falls  Outcome: Ongoing

## 2021-08-30 NOTE — DISCHARGE INSTR - COC
Continuity of Care Form    Patient Name: Amara Yeung   :  1950  MRN:  [id number]    Admit date:  2021  Discharge date:  2021    Code Status Order: Full Code   Advance Directives:      Admitting Physician:  Emily Aguilar MD  PCP: Huey Ricci MD    Discharging Nurse: Eda Patel [facility] Unit/Room#: 0IB-2139/7550-58  Discharging Unit Phone Number: [phone]    Emergency Contact:   Extended Emergency Contact Information  Primary Emergency Contact: Alba Lima  Address: [address], [address]  Home Phone: [phone]  Relation: Spouse  Secondary Emergency Contact: Bryan Cates Phone: [phone]  Relation: Child    Past Surgical History:  Past Surgical History:   Procedure Laterality Date    APPENDECTOMY      SKIN BIOPSY      TONSILLECTOMY         Immunization History: There is no immunization history on file for this patient.     Active Problems:  Patient Active Problem List   Diagnosis Code    Pneumonia J18.9    Acute respiratory failure with hypoxia (HCC) J96.01    COVID-19 U07.1    Essential hypertension I10       Isolation/Infection:   Isolation            Droplet Plus          Patient Infection Status       Infection Onset Added Last Indicated Last Indicated By Review Planned Expiration Resolved Resolved By    COVID-19 21 COVID-19 21      Resolved    COVID-19 Rule Out 21 COVID-19 (Ordered)   21 Rule-Out Test Resulted            Nurse Assessment:  Last Vital Signs: /71   Pulse 58   Temp 98.9 °F (37.2 °C) (Temporal)   Resp 18   Ht 5' 10\" (1.778 m)   Wt 220 lb (99.8 kg)   SpO2 91%   BMI 31.57 kg/m²     Last documented pain score (0-10 scale): Pain Level: 0  Last Weight:   Wt Readings from Last 1 Encounters:   21 220 lb (99.8 kg)     Mental Status:  oriented, alert, coherent, logical, thought processes intact and able to concentrate and follow conversation    IV Access:  - None    Nursing Mobility/ADLs:  Walking   Independent  Transfer  Independent  Bathing  Independent  Dressing  Independent  Toileting  Independent  Feeding  Independent  Med Admin  Assisted  Med Delivery   whole    Wound Care Documentation and Therapy:        Elimination:  Continence:   · Bowel: Yes  · Bladder: Yes  Urinary Catheter: None   Colostomy/Ileostomy/Ileal Conduit: No       Date of Last BM: 8/30/2021    Intake/Output Summary (Last 24 hours) at 8/30/2021 1339  Last data filed at 8/30/2021 0949  Gross per 24 hour   Intake 300 ml   Output 600 ml   Net -300 ml     I/O last 3 completed shifts: In: 180 [P.O.:180]  Out: 1000 [Urine:1000]    Safety Concerns:     None    Impairments/Disabilities:      None    Nutrition Therapy:  Current Nutrition Therapy:   - Oral Diet:  General    Routes of Feeding: Oral  Liquids: No Restrictions  Daily Fluid Restriction: no  Last Modified Barium Swallow with Video (Video Swallowing Test): not done    Treatments at the Time of Hospital Discharge:   Respiratory Treatments: Albuterol and Atrovent  Oxygen Therapy:  is on oxygen at 3 L/min per nasal cannula.   Ventilator:    - No ventilator support    Rehab Therapies: Physical Therapy and Occupational Therapy  Weight Bearing Status/Restrictions: No weight bearing restirctions  Other Medical Equipment (for information only, NOT a DME order):  n/a  Other Treatments: n/a    Patient's personal belongings (please select all that are sent with patient):  None    RN SIGNATURE:  Electronically signed by Rosi Mae RN on 8/31/21 at 1:54 PM EDT    CASE MANAGEMENT/SOCIAL WORK SECTION    Inpatient Status Date: 8/19/2021    Readmission Risk Assessment Score:  Readmission Risk              Risk of Unplanned Readmission:  11           Discharging to Facility/ Agency   · Name:   · Address:  · Phone:  · Fax:    Dialysis Facility (if applicable)   · Name:  · Address:  · Dialysis Schedule:  · Phone:  · Fax:    Case Manager/ signature: Kee Copeland RN, BSN  [phone]       PHYSICIAN SECTION    Prognosis: Guarded    Condition at Discharge: Stable    Rehab Potential (if transferring to Rehab): Fair    Recommended Labs or Other Treatments After Discharge: [facility] and Home O2 , Home PT OT and nurse visits , follow up with PCP and Pulmonary consultant     Physician Certification: I certify the above information and transfer of Leisa Mera  is necessary for the continuing treatment of the diagnosis listed and that he requires [address] for less 30 days.      Update Admission H&P: No change in H&P    PHYSICIAN SIGNATURE:  Electronically signed by Cristina Coates MD on 8/31/21 at 10:58 AM EDT

## 2021-08-30 NOTE — PROGRESS NOTES
VSS. A&O. Morning medications given. Pt expresses no needs at this time. Bed alarm on. Call light within reach. Will continue to monitor.

## 2021-08-30 NOTE — PLAN OF CARE
Problem: Falls - Risk of:  Goal: Will remain free from falls  Description: Will remain free from falls  Outcome: Ongoing  Goal: Absence of physical injury  Description: Absence of physical injury  Outcome: Ongoing     Problem: Gas Exchange - Impaired  Goal: Absence of hypoxia  Outcome: Ongoing     Problem: Breathing Pattern - Ineffective  Goal: Ability to achieve and maintain a regular respiratory rate  Outcome: Ongoing     Problem: Body Temperature -  Risk of, Imbalanced  Goal: Ability to maintain a body temperature within defined limits  Outcome: Ongoing     Problem: Isolation Precautions - Risk of Spread of Infection  Goal: Prevent transmission of infection  Outcome: Ongoing   Pt has maintained oxygen saturation above 90% with BIPAP raising oxygen saturation to at least 95% and above. Pt has maintained effective breathing and patent airway as well as body temperature. Pt remains free of falls and injury and calls out appropriately when needing to go to . [facility].

## 2021-08-30 NOTE — CARE COORDINATION
CM reviewed chart and pt is on 4 liters of oxygen at this time. Pt needs updated therapy evaluations and CM sent message to [place] with therapy. Pt may be able to d/c home with hc and oxygen. CM will follow for d/c plan.     Baudilio Grayson RN, BSN  [phone]

## 2021-08-30 NOTE — PROGRESS NOTES
P Pulmonary and Critical Care    Follow Up Note    Subjective:   CHIEF COMPLAINT / HPI:   Chief Complaint   Patient presents with    Shortness of Breath     Pt endorses shortness of breath and cough for \"a couple weeks. \" Pt is not vaccinated against covid-19. Pt arrived on RA with oxygen saturation 83%. Pt placed on 6L NC with rebound to 94%.  Cough       Interval history: He is feeling better. He is able to ambulate in the room without desaturating. He is even had his oxygen off at times and maintain saturations in the 90s for at least a short time. At the moment, he is on 5 L/min with saturations between 94 and 96%. Past Medical History:    Reviewed; no changes    Social History:    Reviewed; no changes    REVIEW OF SYSTEMS:    CONSTITUTIONAL:  negative for fevers and chills  RESPIRATORY:  See HPI  CARDIOVASCULAR:  negative for chest pain, palpitations, edema  GASTROINTESTINAL:  negative for nausea, vomiting, diarrhea, constipation and abdominal pain    Objective:   PHYSICAL EXAM:        VITALS:  /69   Pulse 54   Temp 97.6 °F (36.4 °C) (Oral)   Resp 18   Ht 5' 10\" (1.778 m)   Wt 220 lb (99.8 kg)   SpO2 93%   BMI 31.57 kg/m²  on 5L NC    24HR INTAKE/OUTPUT:      Intake/Output Summary (Last 24 hours) at 8/30/2021 1122  Last data filed at 8/30/2021 0949  Gross per 24 hour   Intake 300 ml   Output 1000 ml   Net -700 ml       CONSTITUTIONAL:  awake, alert,  no apparent distress, and appears stated age  LUNGS:  No increased work of breathing and basilar crackles to auscultation. CARDIOVASCULAR: S1 and S2 and no JVD  ABDOMEN:  normal bowel sounds, non-distended and non-tender to palpation  EXT: No edema, no calf tenderness. Pulses are present bilaterally. NEUROLOGIC:  Mental Status Exam:  Level of Alertness:   awake  Orientation:   person, place, time.  Non focal  SKIN:  normal skin color, texture, turgor, no redness, warmth, or swelling at IV sites    DATA:    CBC:  Recent Labs 08/28/21  0824 08/29/21  0707 08/30/21  0612   WBC 14.7* 13.4* 12.1*   RBC 5.32 5.08 5.02   HGB 15.7 14.9 14.7   HCT 46.8 44.9 44.0    259 201   MCV 87.9 88.4 87.8   MCH 29.4 29.3 29.3   MCHC 33.5 33.1 33.4   RDW 14.5 14.6 14.5      BMP:  Recent Labs     08/28/21  0824 08/29/21  0707 08/30/21  0612    138 134*   K 4.7 4.8 4.9    103 100   CO2 27 26 24   BUN 31* 34* 31*   CREATININE 0.9 0.9 0.9   CALCIUM 9.1 8.8 8.7   GLUCOSE 187* 185* 225*      ABG:  No results for input(s): PHART, ZMP5CRH, PO2ART, WZS0KDW, Z8OMYOOX, BEART in the last 72 hours. Procalcitonin  No results for input(s): PROCAL in the last 72 hours. Radiology Review:  Pertinent images / reports were reviewed as a part of this visit. Assessment:     1. Acute hypoxemic respiratory failure  2. COVID-19 pneumonia  3. Hypercoagulable state  4. Obstructive sleep apnea    Plan:     I have reviewed laboratories, medical records and images for this visit  I reviewed chest imaging from 8/25/2021 which reveals multifocal airspace disease consistent with COVID-19 pneumonia  Covid PCR is positive  He has completed remdesivir, 10 days  CRP is 4.4  Decrease Decadron to 10 mg/day  He has received 2 doses of Actemra  Now tolerating oxygen at 5 L/min HFNC  Once we get him stable on 4 L/min HFNC we can consider discharge to home or rehab. Patient needs outpatient sleep study for probable obstructive sleep apnea. He wants to do this through the [employer].

## 2021-08-30 NOTE — CARE COORDINATION
CM called and spoke to pt and then spoke to spouse about poc. Pt is currently on 4 liters of oxygen. Pt will need updated therapy evaluations to determine if home care is still recommended. Pt and spouse aware may need oxygen at d/c and will refer to company if needed at d/c. Pt concerned about insurance covering everything. Spouse states he needs to not worry about that. CM will continue to follow for d/c needs.     Jackie Beaulieu RN, BSN  [phone]

## 2021-08-31 VITALS
HEART RATE: 50 BPM | OXYGEN SATURATION: 93 % | SYSTOLIC BLOOD PRESSURE: 126 MMHG | TEMPERATURE: 97.9 F | HEIGHT: 70 IN | RESPIRATION RATE: 18 BRPM | BODY MASS INDEX: 31.5 KG/M2 | DIASTOLIC BLOOD PRESSURE: 73 MMHG | WEIGHT: 220 LBS

## 2021-08-31 PROCEDURE — 99233 SBSQ HOSP IP/OBS HIGH 50: CPT | Performed by: INTERNAL MEDICINE

## 2021-08-31 PROCEDURE — 94640 AIRWAY INHALATION TREATMENT: CPT

## 2021-08-31 PROCEDURE — 2700000000 HC OXYGEN THERAPY PER DAY

## 2021-08-31 PROCEDURE — 94680 O2 UPTK RST&XERS DIR SIMPLE: CPT

## 2021-08-31 PROCEDURE — 94660 CPAP INITIATION&MGMT: CPT

## 2021-08-31 PROCEDURE — 94761 N-INVAS EAR/PLS OXIMETRY MLT: CPT

## 2021-08-31 PROCEDURE — 6360000002 HC RX W HCPCS: Performed by: INTERNAL MEDICINE

## 2021-08-31 RX ORDER — ALBUTEROL SULFATE 90 UG/1
2 AEROSOL, METERED RESPIRATORY (INHALATION) 4 TIMES DAILY
Qty: 1 INHALER | Refills: 3 | Status: SHIPPED | OUTPATIENT
Start: 2021-08-31 | End: 2022-02-02 | Stop reason: ALTCHOICE

## 2021-08-31 RX ORDER — DEXAMETHASONE SODIUM PHOSPHATE 10 MG/ML
10 INJECTION, SOLUTION INTRAMUSCULAR; INTRAVENOUS DAILY
Status: DISCONTINUED | OUTPATIENT
Start: 2021-08-31 | End: 2021-08-31

## 2021-08-31 RX ORDER — DEXAMETHASONE 4 MG/1
6 TABLET ORAL DAILY
Status: DISCONTINUED | OUTPATIENT
Start: 2021-09-01 | End: 2021-08-31 | Stop reason: HOSPADM

## 2021-08-31 RX ORDER — DEXAMETHASONE 6 MG/1
6 TABLET ORAL DAILY
Qty: 5 TABLET | Refills: 0 | Status: SHIPPED | OUTPATIENT
Start: 2021-08-31 | End: 2021-09-05

## 2021-08-31 RX ADMIN — Medication 2 PUFF: at 08:38

## 2021-08-31 RX ADMIN — Medication 2 PUFF: at 11:43

## 2021-08-31 RX ADMIN — MUPIROCIN: 20 OINTMENT TOPICAL at 08:17

## 2021-08-31 RX ADMIN — DEXAMETHASONE SODIUM PHOSPHATE 10 MG: 10 INJECTION, SOLUTION INTRAMUSCULAR; INTRAVENOUS at 08:16

## 2021-08-31 RX ADMIN — ENOXAPARIN SODIUM 100 MG: 100 INJECTION SUBCUTANEOUS at 08:19

## 2021-08-31 ASSESSMENT — PAIN SCALES - GENERAL: PAINLEVEL_OUTOF10: 0

## 2021-08-31 NOTE — PROGRESS NOTES
Physical/Occupational Therapy  Naila Rivet    Attempted to see pt for PT/OT treatment. Pt declined therapy at this time stating he wants to conserve energy for discharge home this date. Educated on role of acute PT/OT. Patient reports he has been ambulating short distances in room independently and able to manage O2 tubing without difficulty. Discussed current recommendations of home PT/OT and patient agreeable. Continue to recommend home therapy services due to need for energy conservation techniques and endurance training. Will follow up with pt if discharge delayed.  Thanks, Ko Fong, PT, DPT [id number], Brianda Salvador OTR/L [id number]

## 2021-08-31 NOTE — PROGRESS NOTES
Department of Internal Medicine  General Internal Medicine   Progress Note      SUBJECTIVE:  Breathing and O2 requirements distinctly improved   History obtained from chart review and the patient  General ROS: positive for  - fatigue and malaise  negative for - chills, fever or night sweats  Psychological ROS: negative  Ophthalmic ROS: negative  Respiratory ROS: positive for - cough, shortness of breath, tachypnea and wheezing  negative for - hemoptysis, stridor or wheezing  Cardiovascular ROS: positive for - dyspnea on exertion and shortness of breath  negative for - chest pain or palpitations  Gastrointestinal ROS: no abdominal pain, change in bowel habits, or black or bloody stools  Genito-Urinary ROS: no dysuria, trouble voiding, or hematuria  Musculoskeletal ROS: negative  Neurological ROS: no TIA or stroke symptoms  Dermatological ROS: negative    OBJECTIVE      Medications      Current Facility-Administered Medications: dexamethasone (DECADRON) tablet 6 mg, 6 mg, Oral, Daily  hydrALAZINE (APRESOLINE) injection 10 mg, 10 mg, IntraVENous, Q6H PRN  0.9 % sodium chloride bolus, 500 mL, IntraVENous, PRN  potassium chloride (KLOR-CON M) extended release tablet 40 mEq, 40 mEq, Oral, PRN **OR** potassium bicarb-citric acid (EFFER-K) effervescent tablet 40 mEq, 40 mEq, Oral, PRN **OR** potassium chloride 10 mEq/100 mL IVPB (Peripheral Line), 10 mEq, IntraVENous, PRN  albuterol sulfate  (90 Base) MCG/ACT inhaler 2 puff, 2 puff, Inhalation, 4x daily  ipratropium (ATROVENT HFA) 17 MCG/ACT inhaler 2 puff, 2 puff, Inhalation, 4x daily  0.9 % sodium chloride bolus, 30 mL, IntraVENous, PRN  mupirocin (BACTROBAN) 2 % ointment, , Topical, BID    Physical      Vitals: /73   Pulse 50   Temp 97.9 °F (36.6 °C) (Oral)   Resp 18   Ht 5' 10\" (1.778 m)   Wt 220 lb (99.8 kg)   SpO2 93%   BMI 31.57 kg/m²   Temp: Temp: 97.9 °F (36.6 °C)  Max: Temp  Av °F (36.7 °C)  Min: 97.6 °F (36.4 °C)  Max: 98.9 °F (37.2 °C)  Respiration range:  Resp  Av.5  Min: 14  Max: 21  Pulse Range:  Pulse  Av.3  Min: 50  Max: 76  Blood pressure range:  Systolic (50VZB), JFV:271 , Min:121 , GHU:747   , Diastolic (46ZQB), FLT:81, Min:55, Max:73    SpO2  Av.8 %  Min: 91 %  Max: 97 %    Intake/Output Summary (Last 24 hours) at 2021 1057  Last data filed at 2021 0926  Gross per 24 hour   Intake 480 ml   Output --   Net 480 ml       Vent settings:  Pulse  Av.4  Min: 42  Max: 87  Resp  Av.9  Min: 12  Max: 30  SpO2  Av %  Min: 70 %  Max: 100 %    CONSTITUTIONAL:  fatigued, alert, cooperative, mild distress, appears stated age and normal weight  EYES:  Unremarkable   NECK:  Mild JVD  and supple, symmetrical, trachea midline  BACK:  symmetric and no curvature  LUNGS:  tachypneic, Severe respiratory distress, moderate air exchange, no retractions and crackles diffuse, rhonchi diffuse, wheeze diffuse  CARDIOVASCULAR:  normal apical pulses, regular rate and rhythm, normal S1 and S2, no S3 and no S4  ABDOMEN:  Soft non tender BS =   MUSCULOSKELETAL:  Trace edema   NEUROLOGIC:  Grossly intact   SKIN:  Warm and dry  and no bruising or bleeding    Data      Recent Results (from the past 96 hour(s))   Basic Metabolic Panel    Collection Time: 21  8:24 AM   Result Value Ref Range    Sodium 139 136 - 145 mmol/L    Potassium 4.7 3.5 - 5.1 mmol/L    Chloride 101 99 - 110 mmol/L    CO2 27 21 - 32 mmol/L    Anion Gap 11 3 - 16    Glucose 187 (H) 70 - 99 mg/dL    BUN 31 (H) 7 - 20 mg/dL    CREATININE 0.9 0.8 - 1.3 mg/dL    GFR Non-African American >60 >60    GFR African American >60 >60    Calcium 9.1 8.3 - 10.6 mg/dL   CBC Auto Differential    Collection Time: 21  8:24 AM   Result Value Ref Range    WBC 14.7 (H) 4.0 - 11.0 K/uL    RBC 5.32 4.20 - 5.90 M/uL    Hemoglobin 15.7 13.5 - 17.5 g/dL    Hematocrit 46.8 40.5 - 52.5 %    MCV 87.9 80.0 - 100.0 fL    MCH 29.4 26.0 - 34.0 pg    MCHC 33.5 31.0 - 36.0 g/dL    RDW 14.5 12.4 - 15.4 %    Platelets 889 784 - 233 K/uL    MPV 8.2 5.0 - 10.5 fL    Neutrophils % 87.8 %    Lymphocytes % 7.7 %    Monocytes % 4.3 %    Eosinophils % 0.0 %    Basophils % 0.2 %    Neutrophils Absolute 12.9 (H) 1.7 - 7.7 K/uL    Lymphocytes Absolute 1.1 1.0 - 5.1 K/uL    Monocytes Absolute 0.6 0.0 - 1.3 K/uL    Eosinophils Absolute 0.0 0.0 - 0.6 K/uL    Basophils Absolute 0.0 0.0 - 0.2 K/uL   Basic Metabolic Panel    Collection Time: 08/29/21  7:07 AM   Result Value Ref Range    Sodium 138 136 - 145 mmol/L    Potassium 4.8 3.5 - 5.1 mmol/L    Chloride 103 99 - 110 mmol/L    CO2 26 21 - 32 mmol/L    Anion Gap 9 3 - 16    Glucose 185 (H) 70 - 99 mg/dL    BUN 34 (H) 7 - 20 mg/dL    CREATININE 0.9 0.8 - 1.3 mg/dL    GFR Non-African American >60 >60    GFR African American >60 >60    Calcium 8.8 8.3 - 10.6 mg/dL   CBC Auto Differential    Collection Time: 08/29/21  7:07 AM   Result Value Ref Range    WBC 13.4 (H) 4.0 - 11.0 K/uL    RBC 5.08 4.20 - 5.90 M/uL    Hemoglobin 14.9 13.5 - 17.5 g/dL    Hematocrit 44.9 40.5 - 52.5 %    MCV 88.4 80.0 - 100.0 fL    MCH 29.3 26.0 - 34.0 pg    MCHC 33.1 31.0 - 36.0 g/dL    RDW 14.6 12.4 - 15.4 %    Platelets 578 630 - 803 K/uL    MPV 7.5 5.0 - 10.5 fL    Neutrophils % 83.4 %    Lymphocytes % 9.7 %    Monocytes % 6.4 %    Eosinophils % 0.1 %    Basophils % 0.4 %    Neutrophils Absolute 11.2 (H) 1.7 - 7.7 K/uL    Lymphocytes Absolute 1.3 1.0 - 5.1 K/uL    Monocytes Absolute 0.9 0.0 - 1.3 K/uL    Eosinophils Absolute 0.0 0.0 - 0.6 K/uL    Basophils Absolute 0.0 0.0 - 0.2 K/uL   C-Reactive Protein    Collection Time: 08/29/21  7:07 AM   Result Value Ref Range    CRP <3.0 0.0 - 5.1 mg/L   CBC Auto Differential    Collection Time: 08/30/21  6:12 AM   Result Value Ref Range    WBC 12.1 (H) 4.0 - 11.0 K/uL    RBC 5.02 4.20 - 5.90 M/uL    Hemoglobin 14.7 13.5 - 17.5 g/dL    Hematocrit 44.0 40.5 - 52.5 %    MCV 87.8 80.0 - 100.0 fL    MCH 29.3 26.0 - 34.0 pg    MCHC 33.4 31.0 - 36.0 g/dL    RDW 14.5 12.4 - 15.4 %    Platelets 783 820 - 395 K/uL    MPV 8.4 5.0 - 10.5 fL    Neutrophils % 86.8 %    Lymphocytes % 8.0 %    Monocytes % 5.0 %    Eosinophils % 0.0 %    Basophils % 0.2 %    Neutrophils Absolute 10.5 (H) 1.7 - 7.7 K/uL    Lymphocytes Absolute 1.0 1.0 - 5.1 K/uL    Monocytes Absolute 0.6 0.0 - 1.3 K/uL    Eosinophils Absolute 0.0 0.0 - 0.6 K/uL    Basophils Absolute 0.0 0.0 - 0.2 K/uL   Basic Metabolic Panel    Collection Time: 08/30/21  6:12 AM   Result Value Ref Range    Sodium 134 (L) 136 - 145 mmol/L    Potassium 4.9 3.5 - 5.1 mmol/L    Chloride 100 99 - 110 mmol/L    CO2 24 21 - 32 mmol/L    Anion Gap 10 3 - 16    Glucose 225 (H) 70 - 99 mg/dL    BUN 31 (H) 7 - 20 mg/dL    CREATININE 0.9 0.8 - 1.3 mg/dL    GFR Non-African American >60 >60    GFR African American >60 >60    Calcium 8.7 8.3 - 10.6 mg/dL       ASSESSMENT AND PLAN     Active Problems:    Pneumonia    Acute respiratory failure with hypoxia (HCC)    COVID-19    Essential hypertension  Resolved Problems:    * No resolved hospital problems.  *    Keep weaning O2 ,  Wean steroids   Will need DME Home O2   HHc will be required

## 2021-08-31 NOTE — PROGRESS NOTES
Home Oxygen Evaluation           -88% or less on room air at rest and awake      O2 sat on room air at rest =86%       O2 sat on 1LPM oxygen at rest = 88%       O2 sat on 2LPM oxygen at rest = 93%               O2 sat on 1 LPM oxygen with exertion = 87%       O2 sat on 2LPM oxygen with exertion = 88%         O2 sat on 3LPM oxygen with exertion = 92%

## 2021-08-31 NOTE — CARE COORDINATION
RAI spoke to [facility] at [address] [address] [address] main number [phone] and was told to call [facility] which CM informed her I did that was on hold for extended time and no answer. RAI called Krystian Weiss [phone] and on hold again for 10 minutes. CM  is going is assisting and on hold for [facility]. Rai called wife back she has same phone numbers and fax number that I do. RAI informed her of issues with no return call and that I faxed all necessary paperwork to two fax numbers. She states she will try to call and have company call me. Rai also called a [phone] that wife had and on hold for almost another 10 minutes. Pt cannot d/c home until he has oxygen delivered to go home.      Gucci Willingham, RN, BSN  [phone]

## 2021-08-31 NOTE — CARE COORDINATION
CM faxed home oxygen evaluation to [address] to both fax numbers CM has : [phone] and [phone]. CM phone number is on fax paper. CM will attempt to call company again later.     Reshma Marcial RN, BSN  [phone]

## 2021-08-31 NOTE — PROGRESS NOTES
VSS except HR of 50 (Ag Goldne MD). A&O x4. Morning medications given. No other needs expressed by pt at this time. Call light within reach. Will continue to monitor.

## 2021-08-31 NOTE — PLAN OF CARE
Problem: Falls - Risk of:  Goal: Will remain free from falls  Description: Will remain free from falls  8/30/2021 2103 by Merlyn Chavez RN  Outcome: Ongoing  8/30/2021 1236 by Greg Valle RN  Outcome: Ongoing  Goal: Absence of physical injury  Description: Absence of physical injury  8/30/2021 2103 by Merlyn Chavez RN  Outcome: Ongoing  8/30/2021 1236 by Greg Valle RN  Outcome: Ongoing     Problem: Airway Clearance - Ineffective  Goal: Achieve or maintain patent airway  8/30/2021 2103 by Merlyn Chavez RN  Outcome: Ongoing  8/30/2021 1236 by Greg Valle RN  Outcome: Ongoing     Problem: Gas Exchange - Impaired  Goal: Absence of hypoxia  8/30/2021 2103 by Merlyn Chavez RN  Outcome: Ongoing  8/30/2021 1236 by Greg Valle RN  Outcome: Ongoing  Goal: Promote optimal lung function  8/30/2021 2103 by Merlyn Chavez RN  Outcome: Ongoing  8/30/2021 1236 by Greg Valle RN  Outcome: Ongoing     Problem: Breathing Pattern - Ineffective  Goal: Ability to achieve and maintain a regular respiratory rate  8/30/2021 2103 by Merlyn Chavez RN  Outcome: Ongoing  8/30/2021 1236 by Greg Valle RN  Outcome: Ongoing     Problem:  Body Temperature -  Risk of, Imbalanced  Goal: Ability to maintain a body temperature within defined limits  8/30/2021 2103 by Merlyn Chavez RN  Outcome: Ongoing  8/30/2021 1236 by Greg Valle RN  Outcome: Ongoing  Goal: Will regain or maintain usual level of consciousness  8/30/2021 2103 by Merlyn Chavez RN  Outcome: Ongoing  8/30/2021 1236 by Greg Valle RN  Outcome: Ongoing  Goal: Complications related to the disease process, condition or treatment will be avoided or minimized  8/30/2021 2103 by Merlyn Chavez RN  Outcome: Ongoing  8/30/2021 1236 by Greg Valle RN  Outcome: Ongoing     Problem: Isolation Precautions - Risk of Spread of Infection  Goal: Prevent transmission of infection  8/30/2021 2103 by Merlyn Chavez RN  Outcome: Ongoing  8/30/2021 1236 by Tez August Onel Hagen RN  Outcome: Ongoing     Problem: Nutrition Deficits  Goal: Optimize nutritional status  8/30/2021 2103 by Mandy Penny RN  Outcome: Ongoing  8/30/2021 1236 by Tushar Arthur RN  Outcome: Ongoing     Problem: Risk for Fluid Volume Deficit  Goal: Maintain normal heart rhythm  8/30/2021 2103 by Mandy Penny RN  Outcome: Ongoing  8/30/2021 1236 by Tushar Arthur RN  Outcome: Ongoing  Goal: Maintain absence of muscle cramping  8/30/2021 2103 by Mandy Penny RN  Outcome: Ongoing  8/30/2021 1236 by Tushar Arthur RN  Outcome: Ongoing  Goal: Maintain normal serum potassium, sodium, calcium, phosphorus, and pH  8/30/2021 2103 by Mandy Penny RN  Outcome: Ongoing  8/30/2021 1236 by Tushar Arthur RN  Outcome: Ongoing     Problem: Loneliness or Risk for Loneliness  Goal: Demonstrate positive use of time alone when socialization is not possible  8/30/2021 2103 by Mandy Penny RN  Outcome: Ongoing  8/30/2021 1236 by Tushar Arthur RN  Outcome: Ongoing     Problem: Fatigue  Goal: Verbalize increase energy and improved vitality  8/30/2021 2103 by Mandy Penny RN  Outcome: Ongoing  8/30/2021 1236 by Tushar Arthur RN  Outcome: Ongoing     Problem: Patient Education: Go to Patient Education Activity  Goal: Patient/Family Education  8/30/2021 2103 by Mandy Penny RN  Outcome: Ongoing  8/30/2021 1236 by Tushar Arthur RN  Outcome: Ongoing     Problem: Musculor/Skeletal Functional Status  Goal: Highest potential functional level  8/30/2021 2103 by Mandy Penny RN  Outcome: Ongoing  8/30/2021 1236 by Tushar Arthur RN  Outcome: Ongoing  Goal: Absence of falls  8/30/2021 2103 by Mandy Penny RN  Outcome: Ongoing  8/30/2021 1236 by Tushar Arthur RN  Outcome: Ongoing

## 2021-08-31 NOTE — PROGRESS NOTES
Shift assessment completed. Routine vitals stable. On 3L Scheduled medications given. Patient is awake, alert and oriented. Respirations are easy and unlabored. Patient does not appear to be in distress. Call light within reach.

## 2021-08-31 NOTE — PROGRESS NOTES
P Pulmonary and Critical Care    Follow Up Note    Subjective:   CHIEF COMPLAINT / HPI:   Chief Complaint   Patient presents with    Shortness of Breath     Pt endorses shortness of breath and cough for \"a couple weeks. \" Pt is not vaccinated against covid-19. Pt arrived on RA with oxygen saturation 83%. Pt placed on 6L NC with rebound to 94%.  Cough       Interval history: He fels better and is anxious for discharge home    Past Medical History:    Reviewed; no changes    Social History:    Reviewed; no changes    REVIEW OF SYSTEMS:    CONSTITUTIONAL:  negative for fevers and chills  RESPIRATORY:  See HPI  CARDIOVASCULAR:  negative for chest pain, palpitations, edema  GASTROINTESTINAL:  negative for nausea, vomiting, diarrhea, constipation and abdominal pain    Objective:   PHYSICAL EXAM:        VITALS:  /73   Pulse 50   Temp 97.9 °F (36.6 °C) (Oral)   Resp 18   Ht 5' 10\" (1.778 m)   Wt 220 lb (99.8 kg)   SpO2 93%   BMI 31.57 kg/m²  on 14L NC    24HR INTAKE/OUTPUT:      Intake/Output Summary (Last 24 hours) at 8/31/2021 1051  Last data filed at 8/31/2021 1936  Gross per 24 hour   Intake 480 ml   Output --   Net 480 ml       CONSTITUTIONAL:  awake, alert,  no apparent distress, and appears stated age  LUNGS:  No increased work of breathing and basilar crackles to auscultation. CARDIOVASCULAR: S1 and S2 and no JVD  ABDOMEN:  normal bowel sounds, non-distended and non-tender to palpation  EXT: No edema, no calf tenderness. Pulses are present bilaterally. NEUROLOGIC:  Mental Status Exam:  Level of Alertness:   awake  Orientation:   person, place, time.  Non focal  SKIN:  normal skin color, texture, turgor, no redness, warmth, or swelling at IV sites    DATA:    CBC:  Recent Labs     08/29/21  0707 08/30/21  0612   WBC 13.4* 12.1*   RBC 5.08 5.02   HGB 14.9 14.7   HCT 44.9 44.0    201   MCV 88.4 87.8   MCH 29.3 29.3   MCHC 33.1 33.4   RDW 14.6 14.5      BMP:  Recent Labs     08/29/21  0707 08/30/21  0612    134*   K 4.8 4.9    100   CO2 26 24   BUN 34* 31*   CREATININE 0.9 0.9   CALCIUM 8.8 8.7   GLUCOSE 185* 225*      ABG:  No results for input(s): PHART, PUD3SPT, PO2ART, JXQ0QTQ, A9AYAVXP, BEART in the last 72 hours. Procalcitonin  No results for input(s): PROCAL in the last 72 hours. Radiology Review:  Pertinent images / reports were reviewed as a part of this visit. Assessment:     1. Acute hypoxemic respiratory failure  2. COVID-19 pneumonia  3. Hypercoagulable state  4. Obstructive sleep apnea    Plan:     I have reviewed laboratories, medical records and images for this visit  I reviewed chest imaging from 8/25/2021 which reveals multifocal airspace disease consistent with COVID-19 pneumonia  Covid PCR is positive  Completed 10 days Remedsivir  CRP is <3  Change to Decadron 6 mg po x 5 days additional  Has received 2 doses Actemra  He is tolerating 3 LPM O2 supplement  Check rest exercise O2 saturation  If 88% or less, DC on home O@  F/U in my office in 2 weeks with a CXR. I will arrange.

## 2021-08-31 NOTE — CARE COORDINATION
RN states oxygen has arrived. Patient discharged 8/31/2021 to home with [facility] oxygen and [employer]  [facility]. All discharge needs met per case management.       Michelle Taylor RN, BSN  [phone]

## 2021-09-01 ENCOUNTER — TELEPHONE (OUTPATIENT)
Dept: PULMONOLOGY | Age: 71
End: 2021-09-01

## 2021-09-01 ENCOUNTER — CARE COORDINATION (OUTPATIENT)
Dept: CASE MANAGEMENT | Age: 71
End: 2021-09-01

## 2021-09-01 DIAGNOSIS — U07.1 COVID-19: Primary | ICD-10-CM

## 2021-09-01 PROCEDURE — 1111F DSCHRG MED/CURRENT MED MERGE: CPT | Performed by: INTERNAL MEDICINE

## 2021-09-01 NOTE — CARE COORDINATION
[facility] Transitions Initial Follow Up Call    Call within 2 business days of discharge: Yes    Patient: Pilo Heller Patient : 1950   MRN: [id number]  Reason for Admission:   Discharge Date: 21 RARS: Readmission Risk Score: 11      Last Discharge [address] [id number]       Complaint Diagnosis Description Type Department Provider    21 Shortness of Breath; Cough Suspected COVID-19 virus infection . .. ED to Hosp-Admission (Discharged) (ADMITTED) F[facility] 5T Bart Osullivan MD; Charmayne Coward Pen. .. Non-face-to-face services provided:  Obtained and reviewed discharge summary and/or continuity of care documents  [id number] completed     Transitions of Care Initial Call    Was this an external facility discharge? No Discharge Facility:     Challenges to be reviewed by the provider   Additional needs identified to be addressed with provider: No  none             Method of communication with provider : none      Advance Care Planning:   Does patient have an Advance Directive: not on file; education provided. Was this a readmission? No  Patient stated reason for admission: SOB, cough  Patients top risk factors for readmission: medical condition-COVID    Care Transition Nurse (CTN) contacted the patient by telephone to perform post hospital discharge assessment. Verified name and  with patient as identifiers. Provided introduction to self, and explanation of the CTN role. CTN reviewed discharge instructions, medical action plan and red flags with patient who verbalized understanding. Patient given an opportunity to ask questions and does not have any further questions or concerns at this time. Were discharge instructions available to patient? Yes. Reviewed appropriate site of care based on symptoms and resources available to patient including: When to call 911. The patient agrees to contact the PCP office for questions related to their healthcare.      Medication reconciliation was performed with patient, who verbalizes understanding of administration of home medications. Advised obtaining a 90-day supply of all daily and as-needed medications. Covid Risk Education     Educated patient about risk for severe COVID-19 due to risk factors according to CDC guidelines. CTN reviewed discharge instructions, medical action plan and red flag symptoms with the patient who verbalized understanding. Discussed COVID vaccination status: Yes. Education provided on COVID-19 vaccination as appropriate. Discussed exposure protocols and quarantine with CDC Guidelines. Patient was given an opportunity to verbalize any questions and concerns and agrees to contact CTN or health care provider for questions related to their healthcare. Reviewed and educated patient on any new and changed medications related to discharge diagnosis. Was patient discharged with a pulse oximeter? No, but has one. Discussed and confirmed pulse oximeter discharge instructions and when to notify provider or seek emergency care. Pt states doing well, denies SOB, CP, cough, fever. Pt states the oxygen tank that they were d/c'd with from the hospital was empty when he got home. His sats were 93% through the night. He woke up every 2 hours to check. Currently reading 93% at rest. [facility] nurse was out this morning and called [facility] and the will be delivering oxygen shortly. He stated that the [facility] nurse wants him to follow up with her either way, when it comes or if it does not arrive soon. Agreed to more CTC f/u calls. CTN provided contact information. Plan for follow-up call in 5-7 days based on severity of symptoms and risk factors.   Plan for next call: self management-COVID; O2, HC, F/U appts      Care Transitions 24 Hour Call    Do you have any ongoing symptoms?: No  Do you have a copy of your discharge instructions?: Yes  Do you have all of your prescriptions and are they filled?: Yes  Have you been contacted by a [employer] [address]?: No  Have you scheduled your follow up appointment?: No  Were you discharged with any Home Care or Post Acute Services: Yes  Post Acute Services: Home Health  Do you feel like you have everything you need to keep you well at home?: Yes  Care Transitions Interventions  No Identified Needs         Follow Up  No future appointments.     Rafael Lloyd, RN

## 2021-09-01 NOTE — TELEPHONE ENCOUNTER
----- Message from Ghassan Mckinnon MD sent at 8/31/2021 10:53 AM EDT -----   CXR and Appt in two weeks

## 2021-09-03 ENCOUNTER — CARE COORDINATION (OUTPATIENT)
Dept: CASE MANAGEMENT | Age: 71
End: 2021-09-03

## 2021-09-03 NOTE — CARE COORDINATION
[facility] Transitions Follow Up Call    9/3/2021    Patient: Michael Lou  Patient : 1950   MRN: [id number]  Reason for Admission:   Discharge Date: 21 RARS: Readmission Risk Score: 11         Spoke with: Magda Transitions Subsequent and Final Call    Subsequent and Final Calls  Do you have any ongoing symptoms?: No  Have your medications changed?: No  Do you have any questions related to your medications?: No  Do you currently have any active services?: Yes  Are you currently active with any services?: Home Health  Do you have any needs or concerns that I can assist you with?: No  Identified Barriers: None  Care Transitions Interventions  No Identified Needs  Other Interventions:         Pt states doing well, no issues or concerns. Oxygen company delivered him fresh tanks and supplies the same day the [facility] [employer] called them. \"all is good\" per the pt.  Agreed to more CTC f/u calls      Follow Up  Future Appointments   Date Time Provider Uriel Arreguin   2021 10:30 AM Marcela Salmeron MD PULM [facility] CC [facility]   2021  2:30 PM Beto Butler APRN - NP Penney Schaumann - DYD Berwyn Shock, RN

## 2021-09-10 ENCOUNTER — CARE COORDINATION (OUTPATIENT)
Dept: CASE MANAGEMENT | Age: 71
End: 2021-09-10

## 2021-09-10 NOTE — CARE COORDINATION
[facility] Transitions Follow Up Call    9/10/2021    Patient: Khang Lr  Patient : 1950   MRN: [id number]  Reason for Admission:   Discharge Date: 21 RARS: Readmission Risk Score: 6         Spoke with: wife    States pt is currently napping. She is not listed on a HIPAA form, will reach to pt again at a later date.      Follow Up  Future Appointments   Date Time Provider Uriel Arreguin   2021 10:30 AM Debora Hancock MD [facility]   2021  2:30 PM JEAN Cardoza - NP Allyson Blount RN

## 2021-09-14 ENCOUNTER — HOSPITAL ENCOUNTER (OUTPATIENT)
Dept: GENERAL RADIOLOGY | Age: 71
Discharge: HOME | End: 2021-09-14
Payer: MEDICARE

## 2021-09-14 ENCOUNTER — OFFICE VISIT (OUTPATIENT)
Dept: PULMONOLOGY | Age: 71
End: 2021-09-14
Payer: MEDICARE

## 2021-09-14 ENCOUNTER — HOSPITAL ENCOUNTER (OUTPATIENT)
Age: 71
Discharge: HOME | End: 2021-09-14
Payer: MEDICARE

## 2021-09-14 VITALS — OXYGEN SATURATION: 95 % | HEART RATE: 81 BPM

## 2021-09-14 DIAGNOSIS — J12.82 PNEUMONIA DUE TO COVID-19 VIRUS: Primary | ICD-10-CM

## 2021-09-14 DIAGNOSIS — J96.11 CHRONIC HYPOXEMIC RESPIRATORY FAILURE (HCC): ICD-10-CM

## 2021-09-14 DIAGNOSIS — U07.1 PNEUMONIA DUE TO COVID-19 VIRUS: Primary | ICD-10-CM

## 2021-09-14 DIAGNOSIS — U07.1 COVID-19: ICD-10-CM

## 2021-09-14 PROCEDURE — 71046 X-RAY EXAM CHEST 2 VIEWS: CPT

## 2021-09-14 PROCEDURE — 99214 OFFICE O/P EST MOD 30 MIN: CPT | Performed by: INTERNAL MEDICINE

## 2021-09-14 NOTE — PROGRESS NOTES
[facility]  Follow Up Note  MD Camden Underwoodparul Maciasjoycelyn   YOB: 1950    Date of Visit:  9/14/2021    Assessment/Plan:  1. Pneumonia due to COVID-19 virus  I reviewed chest imaging from today. He has persistent right greater than left multifocal airspace disease  This is improved compared with his admitting chest x-ray but similar to other images from his hospitalization. Has an albuterol inhaler which she has never used  Advised him to try using the albuterol in advance of activity to see if it helped with his dyspnea  Advised him to take a Covid vaccine in about 90 days. He is reluctant to take this vaccination. 2. Chronic hypoxemic respiratory failure (HCC)  Oxygen saturation on 3 L/min supplement is 98%  At rest on room air, he is 95%  We will get exercise oximetry    Addendum: Continues to desaturate with minor exertion while on room air. Continue supplemental oxygen with exertion and sleep        Plan repeat chest x-ray in 1 month      Chief Complaint   Patient presents with    Shortness of Breath     HFU Had CXR done on way down to office today On 3 liters        HPI  The patient presents with a chief complaint of shortness of breath. The patient was hospitalized with COVID-19 pneumonia in August 2021. He was discharged home with supplemental oxygen which she continues to wear. He is using oxygen with sleep and sometimes while at rest during the day. He generally takes it off when he is exerting himself. He has noticed when he is at rest that his oxygen level will be in the mid 90s. He reports shortness of breath with exertion such as climbing a slight incline on his driveway when he comes up from the street. He has mild associated cough. He has no prior pulmonary problems. Review of Systems  No complaint of chest pain, nausea or vomiting    History  I have reviewed past medical, surgical, social and family history.  This is documented elsewhere in the medical record. Physical Exam:  Well developed, well nourished  Alert and oriented  Sclera is clear  No cervical adenopathy  No JVD. Chest examination is faint crackles at the right lung base. Cardiac examination reveals regular rate and rhythm without murmur, gallop or rub. The abdomen is soft, nontender and nondistended. There is no clubbing, cyanosis or edema of the extremities. There is no obvious skin rash. No focal neuro deficicts  Normal mood and affect    No Known Allergies  Prior to Visit Medications    Medication Sig Taking? Authorizing Provider   albuterol sulfate  (90 Base) MCG/ACT inhaler Inhale 2 puffs into the lungs 4 times daily  Franco Londono MD   ipratropium (ATROVENT HFA) 17 MCG/ACT inhaler Inhale 2 puffs into the lungs 4 times daily  Franco Londono MD   mupirocin (BACTROBAN) 2 % cream Apply  topically 3 times daily. Historical Provider, MD       Vitals:    09/14/21 1109   Pulse: 81   SpO2: 95%     There is no height or weight on file to calculate BMI.      Wt Readings from Last 3 Encounters:   08/19/21 220 lb (99.8 kg)     BP Readings from Last 3 Encounters:   08/31/21 126/73        Social History     Tobacco Use   Smoking Status Never Smoker   Smokeless Tobacco Never Used

## 2021-09-15 ENCOUNTER — CARE COORDINATION (OUTPATIENT)
Dept: CASE MANAGEMENT | Age: 71
End: 2021-09-15

## 2021-09-15 NOTE — CARE COORDINATION
[facility] Transitions Follow Up Call    9/15/2021    Patient: Amara Yeung  Patient : 1950   MRN: [id number]  Reason for Admission:   Discharge Date: 21 RARS: Readmission Risk Score: 11         Sp[facility] with:[facility] Transitions Subsequent and Final Call    Subsequent and Final Calls  Do you have any ongoing symptoms?: No  Have your medications changed?: No  Do you have any questions related to your medications?: No  Do you currently have any active services?: Yes  Are you currently active with any services?: Home Health  Do you have any needs or concerns that I can assist you with?: No  Identified Barriers: None  Care Transitions Interventions  No Identified Needs  Other Interventions:         Pt states doing well, no issues or concerns. HC continues to come out. F/U appts listed below. No need for further f/u CTC calls per pt, has [address] .            Follow Up  Future Appointments   Date Time Provider Uriel Arreguin   2021  2:30 PM JEAN Cruz - PERRY [id number] - DYD   10/15/2021 10:00 AM Robbie Hanson MD PULM [facility] Hawkins RN

## 2021-09-23 ENCOUNTER — OFFICE VISIT (OUTPATIENT)
Dept: FAMILY MEDICINE CLINIC | Age: 71
End: 2021-09-23
Payer: MEDICARE

## 2021-09-23 VITALS
HEIGHT: 68 IN | OXYGEN SATURATION: 93 % | TEMPERATURE: 97.5 F | DIASTOLIC BLOOD PRESSURE: 66 MMHG | SYSTOLIC BLOOD PRESSURE: 126 MMHG | BODY MASS INDEX: 32.37 KG/M2 | HEART RATE: 95 BPM | WEIGHT: 213.6 LBS

## 2021-09-23 DIAGNOSIS — Z00.00 PREVENTATIVE HEALTH CARE: Primary | ICD-10-CM

## 2021-09-23 DIAGNOSIS — H91.90 DECREASED HEARING, UNSPECIFIED LATERALITY: ICD-10-CM

## 2021-09-23 DIAGNOSIS — Z13.29 SCREENING FOR THYROID DISORDER: ICD-10-CM

## 2021-09-23 DIAGNOSIS — Z12.5 SCREENING FOR MALIGNANT NEOPLASM OF PROSTATE: ICD-10-CM

## 2021-09-23 DIAGNOSIS — E66.09 CLASS 1 OBESITY DUE TO EXCESS CALORIES WITHOUT SERIOUS COMORBIDITY WITH BODY MASS INDEX (BMI) OF 32.0 TO 32.9 IN ADULT: ICD-10-CM

## 2021-09-23 DIAGNOSIS — Z12.11 SCREEN FOR COLON CANCER: ICD-10-CM

## 2021-09-23 DIAGNOSIS — R73.9 HYPERGLYCEMIA: ICD-10-CM

## 2021-09-23 PROCEDURE — 99204 OFFICE O/P NEW MOD 45 MIN: CPT | Performed by: NURSE PRACTITIONER

## 2021-09-23 ASSESSMENT — PATIENT HEALTH QUESTIONNAIRE - PHQ9
SUM OF ALL RESPONSES TO PHQ QUESTIONS 1-9: 0
2. FEELING DOWN, DEPRESSED OR HOPELESS: 0
SUM OF ALL RESPONSES TO PHQ QUESTIONS 1-9: 0
1. LITTLE INTEREST OR PLEASURE IN DOING THINGS: 0
SUM OF ALL RESPONSES TO PHQ9 QUESTIONS 1 & 2: 0
SUM OF ALL RESPONSES TO PHQ QUESTIONS 1-9: 0

## 2021-09-23 NOTE — PROGRESS NOTES
213 lb 9.6 oz (96.9 kg)    Height: 5' 7.5\" (1.715 m)      Body mass index is 32.96 kg/m². Constitutional: He is oriented to person, place, and time. He appears well-developed and well-nourished. No distress. HEENT:   Head: Normocephalic and atraumatic. Right Ear: Tympanic membrane, external ear and ear canal normal, [place]  Left Ear: Tympanic membrane, external ear and ear canal normal, [place]   Mouth/Throat: Oropharynx is clear and moist and mucous membranes are normal. No oropharyngeal exudate or posterior oropharyngeal erythema. He has no cervical adenopathy. Eyes: Conjunctivae and extraocular motions are normal. Pupils are equal, round, and reactive to light. Neck:  Supple. No JVD present. Carotid bruit is not present. No mass and no thyromegaly present. Cardiovascular: Normal rate, regular rhythm, normal heart sounds and intact distal pulses. Exam reveals no gallop and no friction rub. No murmur heard. Pulmonary/Chest: Effort normal and breath sounds normal. No respiratory distress. He has no wheezes, rhonchi or rales. Decreased inspirations, dysnea with exertion  Abdominal: Soft, non-tender. Bowel sounds and aorta are normal. There is no organomegaly, mass or bruit. Genitourinary:  rectal not indicated by age criteria and lack of symptoms. Musculoskeletal: Normal range of motion, no synovitis. He exhibits no edema. Neurological: He is alert and oriented to person, place, and time. He has normal reflexes. No cranial nerve deficit. Coordination normal.   Skin: Skin is warm, dry and intact. No suspicious lesions are noted. Psychiatric: He has a normal mood and affect.  His speech is normal and behavior is normal. Judgment, cognition and memory are normal.     Preventive Care:  Health Maintenance   Topic Date Due    Hepatitis C screen  Never done    COVID-19 Vaccine (1) Never done    DTaP/Tdap/Td vaccine (1 - Tdap) Never done    Lipid screen  Never done    Colon cancer screen colonoscopy  Never done    Shingles Vaccine (1 of 2) Never done    Pneumococcal 65+ years Vaccine (1 of 1 - PPSV23) Never done   Derek Guthrie Annual Wellness Visit (AWV)  Never done    Flu vaccine (1) Never done    Potassium monitoring  08/30/2022    Creatinine monitoring  08/30/2022    Hepatitis A vaccine  Aged Out    Hepatitis B vaccine  Aged Out    Hib vaccine  Aged Out    Meningococcal (ACWY) vaccine  Aged Out             Preventive plan of care for Ana Pinzon        9/23/2021           Preventive Measures Status       Recommendations for screening   Prostate Cancer Screen  Lab Results   Component Value Date    PSA 2.18 11/18/2010      Test recommended and ordered    Colon Cancer Screen  Last colonoscopy: NA Test recommended and ordered   Diabetes Screen  Glucose (mg/dL)   Date Value   08/30/2021 225 (H)    Test recommended and ordered   Cholesterol Screen  No results found for: CHOL, TRIG, HDL, LDLCALC, LDLDIRECT Test recommended and ordered   Aspirin for Cardiovascular Prevention   Yes Continue daily aspirin   Weight: Body mass index is 32.96 kg/m². 5' 7.5\" (1.715 m)213 lb 9.6 oz (96.9 kg)  Your BMI is 25 or greater, which indicates that you are overweight   Living Will: No Patient declined    Recommended Immunizations      There is no immunization history on file for this patient. Influenza vaccine:  recommended every fall         Other Recommendations ·   Follow up in this office every 12 months for re-evaluation of your chronic medical issues             Assessment/Plan:  Viola Scott was seen today for new patient. Diagnoses and all orders for this visit:    Preventative health care  -     CBC Auto Differential; Future  -     Comprehensive Metabolic Panel, Fasting; Future  -     Lipid, Fasting; Future  -     Microalbumin / Creatinine Urine Ratio; Future  -     Hepatitis C Antibody; Future  -     HIV Screen; Future    Screening for malignant neoplasm of prostate  -     PSA screening;  Future    Screening for thyroid disorder  - TSH with Reflex; Future    Class 1 obesity due to excess calories without serious comorbidity with body mass index (BMI) of 32.0 to 32.9 in adult  -     Hemoglobin A1C; Future  -     Lipid, Fasting; Future    Hyperglycemia  -     Hemoglobin A1C; Future    Screen for colon cancer  -     AFL - Stella Zimmerman MD, Gastroenterology, [facility]    Decreased hearing, unspecified laterality  -     [facility] [place], Audiology, [facility]        Current Outpatient Medications on File Prior to Visit   Medication Sig Dispense Refill    albuterol sulfate  (90 Base) MCG/ACT inhaler Inhale 2 puffs into the lungs 4 times daily 1 Inhaler 3    ipratropium (ATROVENT HFA) 17 MCG/ACT inhaler Inhale 2 puffs into the lungs 4 times daily 1 Inhaler 3    mupirocin (BACTROBAN) 2 % cream Apply  topically 3 times daily. No current facility-administered medications on file prior to visit.       No Known Allergies  Past Medical History:   Diagnosis Date    Essential hypertension 8/20/2021    Pneumonia 8/19/2021      Past Surgical History:   Procedure Laterality Date    APPENDECTOMY      SKIN BIOPSY      TONSILLECTOMY        Family History   Problem Relation Age of Onset    Diabetes Father     Stroke Father       Social History     Tobacco Use    Smoking status: Never Smoker    Smokeless tobacco: Never Used   Substance Use Topics    Alcohol use: No        Review of Systems    Objective:    /66 (Site: Left Upper Arm, Position: Sitting, Cuff Size: Large Adult)   Pulse 95   Temp 97.5 °F (36.4 °C)   Ht 5' 7.5\" (1.715 m)   Wt 213 lb 9.6 oz (96.9 kg)   SpO2 93%   BMI 32.96 kg/m²   Weight: 213 lb 9.6 oz (96.9 kg)     BP Readings from Last 3 Encounters:   09/23/21 126/66   08/31/21 126/73     Wt Readings from Last 3 Encounters:   09/23/21 213 lb 9.6 oz (96.9 kg)   08/19/21 220 lb (99.8 kg)     BMI Readings from Last 3 Encounters:   09/23/21 32.96 kg/m²   08/25/21 31.57 kg/m²       Physical Exam    Assessment/Plan    1. Preventative health care  Advised routine dental and vision  Advised follow up pulmonology for sleep study  Advised COVID/ influenza vaccination  TDAP  - CBC Auto Differential; Future  - Comprehensive Metabolic Panel, Fasting; Future  - Lipid, Fasting; Future  - Microalbumin / Creatinine Urine Ratio; Future  - Hepatitis C Antibody; Future  - HIV Screen; Future    2. Screening for malignant neoplasm of prostate  - PSA screening; Future    3. Screening for thyroid disorder  - TSH with Reflex; Future    4. Class 1 obesity due to excess calories without serious comorbidity with body mass index (BMI) of 32.0 to 32.9 in adult  Advised increased exercise as tolerated  Weight loss and healthy meal choice  - Hemoglobin A1C; Future  - Lipid, Fasting; Future    5. Hyperglycemia  - Hemoglobin A1C; Future    6. Screen for colon cancer  - AFL - Priscilla Osman MD, Gastroenterology, [facility]    7. Decreased hearing, unspecified laterality  - [facility] [place] JOSE, Audiology, [facility]      Return in about 1 year (around 9/23/2022) for annual check up. This dictation was generated by voice recognition computer software. Although all attempts are made to edit the dictation for accuracy, there may be errors in the transcription that are not intended.

## 2021-10-04 ENCOUNTER — PROCEDURE VISIT (OUTPATIENT)
Dept: AUDIOLOGY | Age: 71
End: 2021-10-04
Payer: MEDICARE

## 2021-10-04 DIAGNOSIS — E78.2 MIXED HYPERLIPIDEMIA: Primary | ICD-10-CM

## 2021-10-04 DIAGNOSIS — H90.3 SENSORINEURAL HEARING LOSS (SNHL) OF BOTH EARS: Primary | ICD-10-CM

## 2021-10-04 DIAGNOSIS — R97.20 ELEVATED PSA: ICD-10-CM

## 2021-10-04 PROCEDURE — 92567 TYMPANOMETRY: CPT | Performed by: AUDIOLOGIST

## 2021-10-04 PROCEDURE — 92557 COMPREHENSIVE HEARING TEST: CPT | Performed by: AUDIOLOGIST

## 2021-10-04 RX ORDER — ATORVASTATIN CALCIUM 10 MG/1
10 TABLET, FILM COATED ORAL DAILY
Qty: 90 TABLET | Refills: 1 | Status: SHIPPED | OUTPATIENT
Start: 2021-10-04 | End: 2021-10-07 | Stop reason: SDUPTHER

## 2021-10-04 NOTE — PROGRESS NOTES
[facility]  Division of Audiology/Otolaryngology    10/4/2021     PCP: Tiffanie Mc MD   MRN: [id number]     AUDIOLOGIC AND OTHER PERTINENT MEDICAL HISTORY:      Concetta Ma was seen for hearing and middle ear evaluation. Primary Care Provider is referral source of today's appointment. Patient presents with hearing loss which has progressed over time. Reports no debilitating effects or concerns with hearing status, however, spouse does. Denies tinnitus. ASSESSMENT AND FINDINGS:     Otoscopy revealed: Clear ear canals bilaterally    RIGHT EAR:  Hearing Sensitivity: Mild-severe sensory loss   Word Recognition: Very Poor (0-59%), based on NU-6   Tympanometry: Normal peak pressure and compliance, Type A tympanogram, consistent with normal middle ear function. Acoustic Reflexes: Ipsilateral: Present at normal sensation levels and Present at elevated sensation levels. LEFT EAR:  Hearing Sensitivity: Normal-moderate sensory loss   Word Recognition: Good (80-89%), based on NU-6   Tympanometry: Normal peak pressure and compliance, Type A tympanogram, consistent with normal middle ear function. Acoustic Reflexes: Ipsilateral: Present at normal sensation levels and Present at elevated sensation levels. Quick SIN (hearing performance in noise) assessment was administered. Concetta Ma scored  16/25.5, consistent with 9.5 dB signal-noise ratio loss. This suggests difficulty hearing during the presence of noise or complex listening enviornments. COUNSELING:        Reviewed purpose of testing completed today, general auditory system function, and results obtained today. Patient was provided with a copy of audiogram.   I discussed the possible associated impairment and disability. I advised of the need for annual and as needed hearing tests. Heavy emphasis was placed on the benefits of hearing aids, with respect to cognition.           Degree of   Hearing Sensitivity dB Range   Within Normal Limits (WNL) 0 - 20   Mild 20 - 40   Moderate 40 - 55   Moderately-Severe 55 - 70   Severe 70 - 90   Profound 90 +          RECOMMENDATIONS:          Return annually to monitor hearing levels. Patient is a candidate for amplification. Will consult with insurance.        Electronically signed by Raya Griffith on 10/4/21 at 8:48 AM.

## 2021-10-07 ENCOUNTER — OFFICE VISIT (OUTPATIENT)
Dept: PULMONOLOGY | Age: 71
End: 2021-10-07
Payer: MEDICARE

## 2021-10-07 VITALS
HEIGHT: 68 IN | SYSTOLIC BLOOD PRESSURE: 138 MMHG | OXYGEN SATURATION: 94 % | DIASTOLIC BLOOD PRESSURE: 72 MMHG | WEIGHT: 213.8 LBS | HEART RATE: 63 BPM | BODY MASS INDEX: 32.4 KG/M2

## 2021-10-07 DIAGNOSIS — U07.1 PNEUMONIA DUE TO COVID-19 VIRUS: ICD-10-CM

## 2021-10-07 DIAGNOSIS — J12.82 PNEUMONIA DUE TO COVID-19 VIRUS: ICD-10-CM

## 2021-10-07 DIAGNOSIS — E78.2 MIXED HYPERLIPIDEMIA: ICD-10-CM

## 2021-10-07 DIAGNOSIS — E66.09 CLASS 1 OBESITY DUE TO EXCESS CALORIES WITH SERIOUS COMORBIDITY AND BODY MASS INDEX (BMI) OF 32.0 TO 32.9 IN ADULT: ICD-10-CM

## 2021-10-07 DIAGNOSIS — J96.11 CHRONIC HYPOXEMIC RESPIRATORY FAILURE (HCC): ICD-10-CM

## 2021-10-07 DIAGNOSIS — G47.33 OSA (OBSTRUCTIVE SLEEP APNEA): Primary | ICD-10-CM

## 2021-10-07 PROCEDURE — 99214 OFFICE O/P EST MOD 30 MIN: CPT | Performed by: INTERNAL MEDICINE

## 2021-10-07 RX ORDER — ATORVASTATIN CALCIUM 10 MG/1
10 TABLET, FILM COATED ORAL DAILY
Qty: 90 TABLET | Refills: 1 | Status: SHIPPED | OUTPATIENT
Start: 2021-10-07 | End: 2022-02-02

## 2021-10-07 RX ORDER — TRAZODONE HYDROCHLORIDE 50 MG/1
50 TABLET ORAL NIGHTLY
Qty: 2 TABLET | Refills: 0 | Status: SHIPPED | OUTPATIENT
Start: 2021-10-07 | End: 2022-02-02 | Stop reason: ALTCHOICE

## 2021-10-07 ASSESSMENT — SLEEP AND FATIGUE QUESTIONNAIRES
ESS TOTAL SCORE: 8
HOW LIKELY ARE YOU TO NOD OFF OR FALL ASLEEP IN A CAR, WHILE STOPPED FOR A FEW MINUTES IN TRAFFIC: 0
HOW LIKELY ARE YOU TO NOD OFF OR FALL ASLEEP WHILE SITTING QUIETLY AFTER LUNCH WITHOUT ALCOHOL: 1
HOW LIKELY ARE YOU TO NOD OFF OR FALL ASLEEP WHILE WATCHING TV: 3
HOW LIKELY ARE YOU TO NOD OFF OR FALL ASLEEP WHILE LYING DOWN TO REST IN THE AFTERNOON WHEN CIRCUMSTANCES PERMIT: 3
HOW LIKELY ARE YOU TO NOD OFF OR FALL ASLEEP WHILE SITTING INACTIVE IN A PUBLIC PLACE: 0
HOW LIKELY ARE YOU TO NOD OFF OR FALL ASLEEP WHILE SITTING AND READING: 1
HOW LIKELY ARE YOU TO NOD OFF OR FALL ASLEEP WHILE SITTING AND TALKING TO SOMEONE: 0
HOW LIKELY ARE YOU TO NOD OFF OR FALL ASLEEP WHEN YOU ARE A PASSENGER IN A CAR FOR AN HOUR WITHOUT A BREAK: 0

## 2021-10-07 NOTE — PROGRESS NOTES
Patient seen , discharge dictated scripts given , arrangements made , CRISELDA completed .  Discussed with nursing staff  And   If applicable ,  Discussed with  Patient's family , all questions answered and concerns addressed  When applicable

## 2021-10-07 NOTE — PROGRESS NOTES
PULMONARY OFFICE FOLLOW-UP VISIT    CONSULTING PHYSICIAN:  Sola Kenney MD , No ref. provider found     REASON FOR VISIT:   Chief Complaint   Patient presents with    New Patient     Ref: Dr. Jamilah Bui Snoring     witnessed apnea    Daytime Sleepiness       DATE OF VISIT: 10/7/2021    HISTORY OF PRESENT ILLNESS: 69y.o. year old male comes into the pulmonary office for follow-up. Patient suffered with COVID-19 infection in September of this year. Had a prolonged course in the hospital.  During the hospitalization was seen to have snoring, hypoxia and was placed on CPAP therapy. He was able to tolerate the CPAP therapy and felt better with it. Patient's wife reports that for the last several years he has been having snoring, gasping, choking at nighttime with poor quality sleep. He is very restless during the sleep and frequently wakes up. Sleep is unrefreshing and feels tired during the daytime. Currently using oxygen with ambulation and at nighttime. Weight is stable. Sleep Medicine 10/7/2021   Sitting and reading 1   Watching TV 3   Sitting, inactive in a public place (e.g. a theatre or a meeting) 0   As a passenger in a car for an hour without a break 0   Lying down to rest in the afternoon when circumstances permit 3   Sitting and talking to someone 0   Sitting quietly after a lunch without alcohol 1   In a car, while stopped for a few minutes in traffic 0   Total score 8       STOP-BANG Questionnaire    Snore Loudly Yes   Often feel Tired/Fatigued/Sleepy Yes   Observed breathing pauses Yes   Blood pressure problems No   BMI >35 Kg/m2 Body mass index is 32.99 kg/m². Age>50 69 y.o. Neck Circumference>16 inches      Gender Male? male     Total 5       REVIEW OF SYSTEMS:   CONSTITUTIONAL SYMPTOMS: The patient denies fever, fatigue, night sweats, weight loss or weight gain. HEENT: No vision changes. No tinnitus, Denies sinus pain. No hoarseness, or dysphagia.    NECK: Patient denies swelling in the neck. CARDIOVASCULAR: Denies chest pain, palpitation, syncope. RESPIRATORY: Denies shortness of breath or cough. GASTROINTESTINAL: Denies nausea, abdominal pain or change in bowel function. GENITOURINARY: Denies obstructive symptoms. No history of incontinence. BREASTS: No masses or lumps in the breasts. SKIN: No rashes or itching. MUSCULOSKELETAL: Denies weakness or bone pain. NEUROLOGICAL: No headaches or seizures. PSYCHIATRIC: Denies mood swings or depression. ENDOCRINE: Denies heat or cold intolerance or excessive thirst.  HEMATOLOGIC/LYMPHATIC: Denies easy bruising or lymph node swelling. ALLERGIC/IMMUNOLOGIC: No environmental allergies. PAST MEDICAL HISTORY:   Past Medical History:   Diagnosis Date    Essential hypertension 8/20/2021    Pneumonia 8/19/2021       PAST SURGICAL HISTORY:   Past Surgical History:   Procedure Laterality Date    APPENDECTOMY      SKIN BIOPSY      TONSILLECTOMY         SOCIAL HISTORY:   Social History     Tobacco Use    Smoking status: Never Smoker    Smokeless tobacco: Never Used   Vaping Use    Vaping Use: Never used   Substance Use Topics    Alcohol use: No    Drug use: No       FAMILY HISTORY:   Family History   Problem Relation Age of Onset    Diabetes Father     Stroke Father        MEDICATIONS:     Current Outpatient Medications on File Prior to Visit   Medication Sig Dispense Refill    atorvastatin (LIPITOR) 10 MG tablet Take 1 tablet by mouth daily (Patient not taking: Reported on 10/7/2021) 90 tablet 1    albuterol sulfate  (90 Base) MCG/ACT inhaler Inhale 2 puffs into the lungs 4 times daily (Patient not taking: Reported on 10/7/2021) 1 Inhaler 3    ipratropium (ATROVENT HFA) 17 MCG/ACT inhaler Inhale 2 puffs into the lungs 4 times daily (Patient not taking: Reported on 10/7/2021) 1 Inhaler 3    mupirocin (BACTROBAN) 2 % cream Apply  topically 3 times daily.        No current facility-administered medications on file prior to visit. ALLERGIES:   Allergies as of 10/07/2021    (No Known Allergies)      OBJECTIVE:   height is 5' 7.5\" (1.715 m) and weight is 213 lb 12.8 oz (97 kg). His blood pressure is 138/72 and his pulse is 63. His oxygen saturation is 94%. PHYSICAL EXAM:    CONSTITUTIONAL: He is a 69y.o.-year-old who appears his stated age. He is alert and oriented x 3 and in no acute distress. HEENT: PERRLA, EOMI. No scleral icterus. No thrush, atraumatic, normocephalic. Mallampati class 2 airway. NECK: Supple, without cervical or supraclavicular lymphadenopathy:  CARDIOVASCULAR: S1 S2 RRR. Without murmer  RESPIRATORY & CHEST: Lungs are clear to auscultation and percussion. No wheezing, no crackles. Good air movement  GASTROINTESTINAL & ABDOMEN: Soft, nontender, positive bowel sounds in all quadrants, non-distended, without hepatosplenomegaly. GENITOURINARY: Deferred. MUSCULOSKELETAL: No tenderness to palpation of the axial skeleton. There is no clubbing. No cyanosis. No edema of the lower extremities. SKIN OF BODY: No rash or jaundice. PSYCHIATRIC EVALUATION: Normal affect. Patient answers questions appropriately. HEMATOLOGIC/LYMPHATIC/ IMMUNOLOGIC: No palpable lymphadenopathy. NEUROLOGIC: Alert and oriented x 3. Groslly non-focal. Motor strength is 5+/5 in all muscle groups. The patient has a normal sensorium globally. LABS:    Lab Summary Latest Ref Rng & Units 9/30/2021 8/30/2021 8/29/2021   WBC 4.0 - 11.0 K/uL 8.5 12. 1(H) 13. 4(H)   Hgb 13.5 - 17.5 g/dL 13.7 14.7 14.9   Hct 40.5 - 52.5 % 41.2 44.0 44.9   Platelets 215 - 952 K/uL 320 201 259   Sodium 136 - 145 mmol/L 143 134(L) 138   Potassium 3.5 - 5.1 mmol/L 4.1 4.9 4.8   BUN 7 - 20 mg/dL 13 31(H) 34(H)   Creatinine 0.8 - 1.3 mg/dL 1.1 0.9 0.9   Glucose 70 - 99 mg/dL - 225(H) 185(H)   Calcium 8.3 - 10.6 mg/dL 9.6 8.7 8.8   Alk Phos 40 - 129 U/L 86 - -   Albumin 3.4 - 5.0 g/dL 4.1 - -   Bilirubin 0.0 - 1.0 mg/dL 0.5 - -   AST 15 - 37 U/L 29 - -   ALT 10 - 40 U/L 45(H) - -   HDL cholesterol 40 - 60 mg/dL 30(L) - -   LDL calc <100 mg/dL 160(H) - -   VLDL calc Not Established mg/dL 47 - -   TSH 0.27 - 4.20 uIU/mL 2.42 - -   Some recent data might be hidden       All labs were personally reviewed by me any my interpretation is: CBC is normal. Chem 8 is dyslipidemia. IMAGING:    Narrative   EXAMINATION:   TWO XRAY VIEWS OF THE CHEST       9/14/2021 11:04 am       FINDINGS:   HEART/MEDIASTINUM: The cardiomediastinal silhouette is within normal limits.       PLEURA/LUNGS: There is patchy hazy opacities in the periphery of the lungs   bilaterally consistent with multifocal airspace disease, which has improved   from the prior exam.  There are no pleural effusions. There is no appreciable   pneumothorax.       BONES/SOFT TISSUE: No acute abnormality.           Impression   Persistent multifocal airspace disease, which has improved from the prior   exam.  Continued follow-up to resolution is recommended.               IMPRESSION AND RECOMMENDATIONS:     1. LEILANI (obstructive sleep apnea)  -Patient has several features which are suspicious for obstructive sleep apnea. -I will order for split-night sleep study in order to investigate this possibility further.  -Patient is still recovering from COVID-19 pneumonia. Has chronic hypoxic respiratory failure and uses oxygen at nighttime. He is a poor candidate for home sleep testing.  -I will give him trazodone 50 mg p.o. nightly to take prior to the sleep study.  - Sleep Study with PAP Titration; Future    2. Pneumonia due to COVID-19 virus  -Slowly improving. 3. Chronic hypoxemic respiratory failure ([address]  -Patient to continue using oxygen at 2 L/min at ambulation as well as while sleeping. 4. Class 1 obesity due to excess calories with serious comorbidity and body mass index (BMI) of 32.0 to 32.9 in adult  -I strongly advised the patient to make efforts to lose weight.   I discussed various modalities including moderate intensity intermittent exercises, diet control and bariatric surgery. If the patient loses even 10 to 15% of current body weight, it will be beneficial in improving the overall health. Return in about 6 weeks (around 11/18/2021) for sita. Morgan Stack MD  Pulmonary Critical Care and Sleep Medicine  10/7/2021, 9:57 AM    This note was completed using dragon medical speech recognition software. Grammatical errors, random word insertions, pronoun errors and incomplete sentences are occasional consequences of this technology due to software limitations. If there are questions or concerns about the content of this note of information contained within the body of this dictation they should be addressed with the provider for clarification.

## 2021-10-08 NOTE — DISCHARGE SUMMARY
[facility]                     [address] [facility], [address] [facility]                               DISCHARGE SUMMARY    PATIENT NAME: Cesar Seat                      :        1950  MED REC NO:   [id number]                          ROOM:       [id number]  ACCOUNT NO:   [de-identified]                           ADMIT DATE: 2021  PROVIDER:     Ambar Campbell MD                  DISCHARGE DATE:  2021    FINAL DIAGNOSES:  1. Acute respiratory failure. 2.  COVID-19 pneumonia. 3.  Hypertension. DISCHARGE MEDICATIONS:  1. Albuterol sulfate two puffs every four hours p.r.n.  2.  Atrovent two puffs every four hours p.r.n.  3.  Dexamethasone 6 mg daily for five doses. 4.  Mupirocin ointment as directed topically in the nostril. HOSPITAL COURSE:  This 16-year-old white American gentleman came to the  Emergency Room at [facility].  He is otherwise healthy,  does not see a doctor much. His PCP is Dr. Nohelia Marshall. The patient  was admitted with COVID-19 pneumonia. He was not vaccinated. His past  medical history totally unremarkable, otherwise in good health. Temperature went up to 101.5, blood pressure 150/71, respirations 22,  heart rate 77. O2 sat 95% on 6 liters. The patient's chest x-ray shows  bilateral extensive infiltrative process, ground-glass opacity all  consistent with COVID pneumonia. The patient was treated with IV  hydration, IV tofacitinib or Actemra. The patient was also treated with  IV remdesivir, high dose dexamethasone. The patient underwent DVT  prophylaxis with full therapeutic dose of Lovenox. Hemoglobin,  hematocrit, white blood cell count and renal function were constantly  monitored. The patient had a very long hospital course, was treated for  10 full days. Pulmonary consultation comanagement was also obtained. Dr. Timur Borges saw the patient on a daily basis for his  respiratory failure.   On many occasions, the patient went for a very  high-flow oxygen requirement and was also on heated airflow. Finally,  the oxygen levels were weaned off and the patient was weaned down to  almost 5 to 6 liter nasal cannula oxygen. The patient was given PT/OT  evaluation. Home healthcare arrangements were made. The patient was  discharged in stable condition. The patient will be following up with  Dr. Navi Perdomo and Pulmonary Service.         Faye Garcia MD    D: 10/07/2021 7:30:19       T: 10/07/2021 9:44:12     [email or web address]  Job#: [id number]     Doc#: [id number]    CC:

## 2021-10-11 ENCOUNTER — TELEPHONE (OUTPATIENT)
Dept: SLEEP CENTER | Age: 71
End: 2021-10-11

## 2021-10-11 NOTE — TELEPHONE ENCOUNTER
Called to schedule split night sleep study per Johana cristina for the pt to return my call   Not sure about shots     [employer]

## 2021-10-15 ENCOUNTER — HOSPITAL ENCOUNTER (OUTPATIENT)
Dept: GENERAL RADIOLOGY | Age: 71
Discharge: HOME | End: 2021-10-15
Payer: MEDICARE

## 2021-10-15 ENCOUNTER — OFFICE VISIT (OUTPATIENT)
Dept: PULMONOLOGY | Age: 71
End: 2021-10-15
Payer: MEDICARE

## 2021-10-15 ENCOUNTER — TELEPHONE (OUTPATIENT)
Dept: PULMONOLOGY | Age: 71
End: 2021-10-15

## 2021-10-15 ENCOUNTER — HOSPITAL ENCOUNTER (OUTPATIENT)
Age: 71
Discharge: HOME | End: 2021-10-15
Payer: MEDICARE

## 2021-10-15 VITALS — OXYGEN SATURATION: 99 % | HEART RATE: 80 BPM

## 2021-10-15 DIAGNOSIS — J96.11 CHRONIC HYPOXEMIC RESPIRATORY FAILURE (HCC): ICD-10-CM

## 2021-10-15 DIAGNOSIS — U07.1 PNEUMONIA DUE TO COVID-19 VIRUS: ICD-10-CM

## 2021-10-15 DIAGNOSIS — J96.11 CHRONIC HYPOXEMIC RESPIRATORY FAILURE (HCC): Primary | ICD-10-CM

## 2021-10-15 DIAGNOSIS — J84.10 PULMONARY FIBROSIS (HCC): ICD-10-CM

## 2021-10-15 DIAGNOSIS — J84.10 PULMONARY FIBROSIS (HCC): Primary | ICD-10-CM

## 2021-10-15 DIAGNOSIS — J12.82 PNEUMONIA DUE TO COVID-19 VIRUS: ICD-10-CM

## 2021-10-15 PROCEDURE — 71046 X-RAY EXAM CHEST 2 VIEWS: CPT

## 2021-10-15 PROCEDURE — 99214 OFFICE O/P EST MOD 30 MIN: CPT | Performed by: INTERNAL MEDICINE

## 2021-10-15 NOTE — PROGRESS NOTES
[facility]  Follow Up Note  MD Ramiro Mantilla Alana   YOB: 1950    Date of Visit:  10/15/2021    Assessment/Plan:  1. Pulmonary fibrosis ([address]  I reviewed imaging for the patient today. Chest x-ray has shown continued clearing of the infiltrate  However, there remains generally increased interstitial marking and areas of more dense consolidation  I suspect he is developing pulmonary fibrosis following COVID-19 pneumonia    2. Chronic hypoxemic respiratory failure (HCC)  Continues to have oxygen desaturation with exertion  Saturations dropped to 87% just walking down the hallway  The patient would benefit from a portable oxygen concentrator at home. He is mobile within his home and pretty active in general.  Advised him to continue oxygen with exertion and sleep    3. Pneumonia due to COVID-19 virus  COVID-19 pneumonia 8/21  He is feeling better with improvement in chest imaging  Still has shortness of breath and exercise-induced hypoxemia. 4. Pre-op  The patient is needing surgery. I think he will tolerate that with attention to his supplemental O2 requiremenst.      Have him back in 2 months with a chest x-ray. Chief Complaint   Patient presents with    Shortness of Breath     had CXR done this morning O2 sat RA at rest 95% O2 sat RA walking/exercising 87% O2 sat 2 liters walking/exercising 95% O2 sat 2 liters at rest 99%       HPI  The patient presents with a chief complaint of shortness of breath. Patient feels like he is improving slowly. Working in the yard without his supplemental oxygen. He feels like his dyspnea is less. He is not having cough. He was treated for Covid pneumonia in August 2021. CT imaging of the time showed patchy bilateral airspace disease. Follow-up imaging has shown gradual clearing but persistent interstitial markings.     Review of Systems  No complaint of chest pain, nausea or vomiting    History  I have reviewed past medical, surgical, social and family history. This is documented elsewhere in the medical record. Physical Exam:  Well developed, well nourished  Alert and oriented  Sclera is clear  No cervical adenopathy  No JVD. Chest examination is clear. Cardiac examination reveals regular rate and rhythm without murmur, gallop or rub. The abdomen is soft, nontender and nondistended. There is no clubbing, cyanosis or edema of the extremities. There is no obvious skin rash. No focal neuro deficicts  Normal mood and affect    No Known Allergies  Prior to Visit Medications    Medication Sig Taking? Authorizing Provider   traZODone (DESYREL) 50 MG tablet Take 1 tablet by mouth nightly  Mikaela Malone MD   atorvastatin (LIPITOR) 10 MG tablet Take 1 tablet by mouth daily  JEAN Lemus NP   albuterol sulfate  (90 Base) MCG/ACT inhaler Inhale 2 puffs into the lungs 4 times daily  Patient not taking: Reported on 10/7/2021  Corby Monsivais MD   ipratropium (ATROVENT HFA) 17 MCG/ACT inhaler Inhale 2 puffs into the lungs 4 times daily  Patient not taking: Reported on 10/7/2021  Corby Monsivais MD   mupirocin (BACTROBAN) 2 % cream Apply  topically 3 times daily. Historical Provider, MD       Vitals:    10/15/21 1031   Pulse: 80   SpO2: 99%     There is no height or weight on file to calculate BMI.      Wt Readings from Last 3 Encounters:   10/07/21 213 lb 12.8 oz (97 kg)   09/23/21 213 lb 9.6 oz (96.9 kg)   08/19/21 220 lb (99.8 kg)     BP Readings from Last 3 Encounters:   10/07/21 138/72   09/23/21 126/66   08/31/21 126/73        Social History     Tobacco Use   Smoking Status Never Smoker   Smokeless Tobacco Never Used

## 2021-10-15 NOTE — TELEPHONE ENCOUNTER
Pt stopped back in office and asked if order for xray should be put in.     Call him to let him know

## 2021-10-18 ENCOUNTER — TELEPHONE (OUTPATIENT)
Dept: PULMONOLOGY | Age: 71
End: 2021-10-18

## 2021-10-18 NOTE — TELEPHONE ENCOUNTER
Received fax asking for clearance for pt to have TRUSP Biopsy. Just seen in office 10-15-21 Can you made addendum for clearance?

## 2021-11-05 DIAGNOSIS — G47.33 OSA (OBSTRUCTIVE SLEEP APNEA): ICD-10-CM

## 2021-11-06 LAB — SARS-COV-2: NOT DETECTED

## 2021-11-10 ENCOUNTER — HOSPITAL ENCOUNTER (OUTPATIENT)
Dept: SLEEP CENTER | Age: 71
Discharge: HOME | End: 2021-11-10
Payer: MEDICARE

## 2021-11-10 DIAGNOSIS — G47.33 OSA (OBSTRUCTIVE SLEEP APNEA): ICD-10-CM

## 2021-11-10 PROCEDURE — 95811 POLYSOM 6/>YRS CPAP 4/> PARM: CPT

## 2021-11-11 PROCEDURE — 95811 POLYSOM 6/>YRS CPAP 4/> PARM: CPT | Performed by: INTERNAL MEDICINE

## 2021-11-15 ENCOUNTER — TELEPHONE (OUTPATIENT)
Dept: PULMONOLOGY | Age: 71
End: 2021-11-15

## 2021-12-08 LAB — PATHOLOGY/CYTOLOGY REPORT: NORMAL

## 2021-12-15 ENCOUNTER — HOSPITAL ENCOUNTER (OUTPATIENT)
Age: 71
Discharge: HOME | End: 2021-12-15
Payer: MEDICARE

## 2021-12-15 ENCOUNTER — HOSPITAL ENCOUNTER (OUTPATIENT)
Dept: CT IMAGING | Age: 71
Discharge: HOME | End: 2021-12-15
Payer: MEDICARE

## 2021-12-15 ENCOUNTER — HOSPITAL ENCOUNTER (OUTPATIENT)
Dept: NUCLEAR MEDICINE | Age: 71
Discharge: HOME | End: 2021-12-15
Payer: MEDICARE

## 2021-12-15 DIAGNOSIS — C61 PROSTATE CA (HCC): ICD-10-CM

## 2021-12-15 DIAGNOSIS — R97.20 ELEVATED PROSTATE SPECIFIC ANTIGEN (PSA): ICD-10-CM

## 2021-12-15 LAB
BUN BLDV-MCNC: 18 MG/DL (ref 7–20)
CREAT SERPL-MCNC: 1 MG/DL (ref 0.8–1.3)
GFR AFRICAN AMERICAN: >60
GFR NON-AFRICAN AMERICAN: >60

## 2021-12-15 PROCEDURE — 6360000004 HC RX CONTRAST MEDICATION: Performed by: UROLOGY

## 2021-12-15 PROCEDURE — 71260 CT THORAX DX C+: CPT

## 2021-12-15 PROCEDURE — 82565 ASSAY OF CREATININE: CPT

## 2021-12-15 PROCEDURE — 36415 COLL VENOUS BLD VENIPUNCTURE: CPT

## 2021-12-15 PROCEDURE — 84520 ASSAY OF UREA NITROGEN: CPT

## 2021-12-15 PROCEDURE — 74178 CT ABD&PLV WO CNTR FLWD CNTR: CPT

## 2021-12-15 PROCEDURE — 78306 BONE IMAGING WHOLE BODY: CPT

## 2021-12-15 PROCEDURE — 2580000003 HC RX 258: Performed by: UROLOGY

## 2021-12-15 PROCEDURE — 3430000000 HC RX DIAGNOSTIC RADIOPHARMACEUTICAL: Performed by: UROLOGY

## 2021-12-15 PROCEDURE — A9503 TC99M MEDRONATE: HCPCS | Performed by: UROLOGY

## 2021-12-15 RX ORDER — SODIUM CHLORIDE 0.9 % (FLUSH) 0.9 %
5-40 SYRINGE (ML) INJECTION PRN
Status: DISCONTINUED | OUTPATIENT
Start: 2021-12-15 | End: 2021-12-16 | Stop reason: HOSPADM

## 2021-12-15 RX ORDER — TC 99M MEDRONATE 20 MG/10ML
26.1 INJECTION, POWDER, LYOPHILIZED, FOR SOLUTION INTRAVENOUS
Status: COMPLETED | OUTPATIENT
Start: 2021-12-15 | End: 2021-12-15

## 2021-12-15 RX ADMIN — TC 99M MEDRONATE 26.1 MILLICURIE: 20 INJECTION, POWDER, LYOPHILIZED, FOR SOLUTION INTRAVENOUS at 07:45

## 2021-12-15 RX ADMIN — Medication 10 ML: at 07:46

## 2021-12-15 RX ADMIN — IOPAMIDOL 75 ML: 755 INJECTION, SOLUTION INTRAVENOUS at 07:05

## 2021-12-28 ENCOUNTER — TELEPHONE (OUTPATIENT)
Dept: FAMILY MEDICINE CLINIC | Age: 71
End: 2021-12-28

## 2021-12-28 NOTE — TELEPHONE ENCOUNTER
Pierre Tavera with [employer] [phone] is calling to see if we have received orders for plan of care and for skilled nursing for the patient. She states that she hasn't received them back yet.        Please advise

## 2021-12-30 ENCOUNTER — TELEPHONE (OUTPATIENT)
Dept: FAMILY MEDICINE CLINIC | Age: 71
End: 2021-12-30

## 2021-12-30 NOTE — TELEPHONE ENCOUNTER
Henny Rose form [facility] [phone] is calling to request for the Nurse to refax the signed orders because, it was missing a page.  Please

## 2022-01-11 ENCOUNTER — VIRTUAL VISIT (OUTPATIENT)
Dept: PULMONOLOGY | Age: 72
End: 2022-01-11
Payer: MEDICARE

## 2022-01-11 ENCOUNTER — HOSPITAL ENCOUNTER (OUTPATIENT)
Dept: GENERAL RADIOLOGY | Age: 72
Discharge: HOME | End: 2022-01-11
Payer: MEDICARE

## 2022-01-11 ENCOUNTER — HOSPITAL ENCOUNTER (OUTPATIENT)
Age: 72
Discharge: HOME | End: 2022-01-11
Payer: MEDICARE

## 2022-01-11 DIAGNOSIS — U07.1 PNEUMONIA DUE TO COVID-19 VIRUS: Primary | ICD-10-CM

## 2022-01-11 DIAGNOSIS — J12.82 PNEUMONIA DUE TO COVID-19 VIRUS: Primary | ICD-10-CM

## 2022-01-11 DIAGNOSIS — J84.10 PULMONARY FIBROSIS (HCC): ICD-10-CM

## 2022-01-11 DIAGNOSIS — J96.11 CHRONIC HYPOXEMIC RESPIRATORY FAILURE (HCC): ICD-10-CM

## 2022-01-11 PROBLEM — E11.9 TYPE 2 DIABETES MELLITUS (HCC): Status: ACTIVE | Noted: 2022-01-11

## 2022-01-11 PROCEDURE — 99213 OFFICE O/P EST LOW 20 MIN: CPT | Performed by: INTERNAL MEDICINE

## 2022-01-11 PROCEDURE — 71046 X-RAY EXAM CHEST 2 VIEWS: CPT

## 2022-01-11 NOTE — PROGRESS NOTES
[facility]  Follow Up Note  Hanna Miller MD    Pulmonology Video Visit    Pursuant to the emergency declaration under the [facility] waiver authority and the Coronavirus Preparedness and Response Supplemental Appropriations Act this Video Visit was insisted, with patient's consent, to reduce the patient's risk of exposure to COVID-19 and provide continuity of care for an established patient. The patient was at home, while the provider was at the clinic. Services were provided through a synchronous discussion through a Video Visit to substitute for in-person clinic visit, and coded as such. Adithya Lares   YOB: 1950    Date of Visit:  1/11/2022    Assessment/Plan:  1. Pulmonary fibrosis ([address]  Current chest imaging is clear  No evidence of established fibrosis    2. Chronic hypoxemic respiratory failure (HCC)  Acceptable oxygen saturations on room air during waking hours  Will get overnight room air oximetry. If he passes this we can get rid of his oxygen  Recommend COVID-19 vaccination. 3. Pneumonia due to COVID-19 virus  COVID-19 pneumonia 8/21  Feels like his breathing is back to normal  Current chest x-ray is clear with no infiltrate or obvious fibrosis    Anticipate as needed follow-up if his overnight oximetry is normal.      Chief Complaint   Patient presents with    Shortness of Breath     had his CXR done this morning       HPI  The patient presents for follow-up of COVID-19 pneumonia. He first had pneumonia in August 2021. He was discharged home with supplemental oxygen. His improvement has been slow. He did have chest x-ray before today's appointment. I have reviewed the imaging. He feels like his breathing is now back to normal.  He is reporting oxygen saturations in the high 90s during the day when he is on room air.   He is still wearing his oxygen with sleep    Review of Systems  No complaint of chest pain, nausea or vomiting    History  I have reviewed past medical, surgical, social and family history. This is documented elsewhere in the medical record. Physical Exam:   Video visit    No Known Allergies  Prior to Visit Medications    Medication Sig Taking? Authorizing Provider   traZODone (DESYREL) 50 MG tablet Take 1 tablet by mouth nightly  Margie Rodriguez MD   atorvastatin (LIPITOR) 10 MG tablet Take 1 tablet by mouth daily  JEAN William - NP   albuterol sulfate  (90 Base) MCG/ACT inhaler Inhale 2 puffs into the lungs 4 times daily  Patient not taking: Reported on 10/7/2021  Delfina Vail MD   ipratropium (ATROVENT HFA) 17 MCG/ACT inhaler Inhale 2 puffs into the lungs 4 times daily  Patient not taking: Reported on 10/7/2021  Delfina Vail MD   mupirocin (BACTROBAN) 2 % cream Apply  topically 3 times daily. Historical Provider, MD       There were no vitals filed for this visit. There is no height or weight on file to calculate BMI.      Wt Readings from Last 3 Encounters:   10/07/21 213 lb 12.8 oz (97 kg)   09/23/21 213 lb 9.6 oz (96.9 kg)   08/19/21 220 lb (99.8 kg)     BP Readings from Last 3 Encounters:   10/07/21 138/72   09/23/21 126/66   08/31/21 126/73        Social History     Tobacco Use   Smoking Status Never Smoker   Smokeless Tobacco Never Used

## 2022-01-19 ENCOUNTER — TELEPHONE (OUTPATIENT)
Dept: PULMONOLOGY | Age: 72
End: 2022-01-19

## 2022-01-19 NOTE — TELEPHONE ENCOUNTER
Patients wife stated there was suppose to be a machine for patient to check to see if he is needing O2. She wasn't entirely sure of the terms or what the doctor stated. Said he lays on it in bed and it determines if he continues with O2 or not.      Please advise

## 2022-01-31 NOTE — PROGRESS NOTES
Name_______________________________________Printed:____________________  Date and time of surgery______2/3/22 0730_________________Arrival Time:_______0600 main_________   1. The instructions given regarding when and if a patient needs to stop oral intake prior to surgery varies. Follow the specific instructions you were given                  __x_Nothing to eat or to drink after Midnight the night before.                   ____Carbo loading or ERAS instructions will be given to select patients-if you have been given those instructions -please do the following                           The evening before your surgery after dinner before midnight drink 40 ounces of gatorade. If you are diabetic use sugar free. The morning of surgery drink 40 ounces of water. This needs to be finished 3 hours prior to your surgery start time. 2. Take the following pills with a small sip of water on the morning of surgery___________________________________________________                  Do not take blood pressure medications ending in pril or sartan the jaylin prior to surgery or the morning of surgery_   3. Aspirin, Ibuprofen, Advil, Naproxen, Vitamin E and other Anti-inflammatory products and supplements should be stopped for 5 -7days before surgery or as directed by your physician. 4. Check with your Doctor regarding stopping Plavix, Coumadin,Eliquis, Lovenox,Effient,Pradaxa,Xarelto, Fragmin or other blood thinners and follow their instructions. 5. Do not smoke, and do not drink any alcoholic beverages 24 hours prior to surgery. This includes NA Beer. Refrain from the usage of any recreational drugs. 6. You may brush your teeth and gargle the morning of surgery. DO NOT SWALLOW WATER   7. You MUST make arrangements for a responsible adult to stay on site while you are here and take you home after your surgery. You will not be allowed to leave alone or drive yourself home.   It is strongly suggested someone stay with you the first 24 hrs. Your surgery will be cancelled if you do not have a ride home. 8. A parent/legal guardian must accompany a child scheduled for surgery and plan to stay at the hospital until the child is discharged. Please do not bring other children with you. 9. Please wear simple, loose fitting clothing to the hospital.  Yara Farmer not bring valuables (money, credit cards, checkbooks, etc.) Do not wear any makeup (including no eye makeup) or nail polish on your fingers or toes. 10. DO NOT wear any jewelry or piercings on day of surgery. All body piercing jewelry must be removed. 11. If you have ___dentures, they will be removed before going to the OR; we will provide you a container. If you wear ___contact lenses or ___glasses, they will be removed; please bring a case for them. 12. Please see your family doctor/pediatrician for a history & physical and/or concerning medications. Bring any test results/reports from your physician's office. PCP________x__________Phone___________H&P Appt. Date________             13 If you  have a Living Will and Durable Power of  for Healthcare, please bring in a copy. 15. Notify your Surgeon if you develop any illness between now and surgery  time, cough, cold, fever, sore throat, nausea, vomiting, etc.  Please notify your surgeon if you experience dizziness, shortness of breath or blurred vision between now & the time of your surgery             15. DO NOT shave your operative site 96 hours prior to surgery. For face & neck surgery, men may use an electric razor 48 hours prior to surgery. 16. Shower the night before or morning of surgery using an antibacterial soap or as you have been instructed. 17. To provide excellent care visitors will be limited to one in the room at any given time. 18.  Please bring picture ID and insurance card.              19.  Visit our web site for additional information:  [email or web address]              20.During flu season no children under the age of 15 are permitted in the hospital for the safety of all patients. 21. If you take a long acting insulin in the evening only  take half of your usual  dose the night  before your procedure              22. If you use a c-pap please bring DOS if staying overnight,             23.For your convenience [facility] [place] has a pharmacy on site to fill your prescriptions. 24. If you use oxygen and have a portable tank please bring it  with you the DOS             25. Bring a complete list of all your medications with name and dose include any supplements. 26. Other__________________________________________   *Please call pre admission testing if you any further questions   [facility]    [address] [place]  [phone]   [facility] [address] JAGJIT CISNEROS   [phone]           COVID TESTING    _x__ Covid test to be done 3-5 days prior to scheduled surgery -patient aware they are REQUIRED to bring a copy of the negative result DOS-if they receive a positive result to notify their surgeon         If known - indicate where patient is getting covid test done ____________2/1/22 mff_______________________________________________    ___ Rapid - DOS    ___ Other___________________________________      [facility] POLICY(subject to change)    There is a one visitor policy at [facility] for all surgeries and endoscopies. Whether the visitor can stay or will be asked to wait in the car will depend on the current policy and if social distancing can be maintained. The policy is subject to change at any time. Please make sure the visitor has a cell phone that is on,charged and able to accept calls, as this may be the way that the staff communicates with them. Pain management is NO VISITOR policyThe patients ride is expected to remain in the car with a cell phone for communication. If the ride is leaving the hospital grounds please make sure they are back in time for pickup. Have the patient inform the staff on arrival what their rides plans are while the patient is in the facility. At the MAIN there is one visitor allowed. Please note that the visitor policy is subject to change. All above information reviewed with patient in person or by phone. Patient verbalizes understanding. All questions and concerns addressed.                                                                                                  Patient/Rep________pt____________                                                                                                                                    PRE OP INSTRUCTIONS

## 2022-02-01 ENCOUNTER — TELEPHONE (OUTPATIENT)
Dept: FAMILY MEDICINE CLINIC | Age: 72
End: 2022-02-01

## 2022-02-01 ENCOUNTER — HOSPITAL ENCOUNTER (OUTPATIENT)
Age: 72
Discharge: HOME | DRG: 708 | End: 2022-02-01
Payer: MEDICARE

## 2022-02-01 DIAGNOSIS — Z01.818 PREOP TESTING: ICD-10-CM

## 2022-02-01 LAB
A/G RATIO: 1.6 (ref 1.1–2.2)
ABO/RH: NORMAL
ALBUMIN SERPL-MCNC: 4.1 G/DL (ref 3.4–5)
ALP BLD-CCNC: 81 U/L (ref 40–129)
ALT SERPL-CCNC: 43 U/L (ref 10–40)
ANION GAP SERPL CALCULATED.3IONS-SCNC: 15 MMOL/L (ref 3–16)
ANTIBODY SCREEN: NORMAL
APTT: 32.7 SEC (ref 26.2–38.6)
AST SERPL-CCNC: 26 U/L (ref 15–37)
BASOPHILS ABSOLUTE: 0 K/UL (ref 0–0.2)
BASOPHILS RELATIVE PERCENT: 0.5 %
BILIRUB SERPL-MCNC: 0.3 MG/DL (ref 0–1)
BUN BLDV-MCNC: 22 MG/DL (ref 7–20)
CALCIUM SERPL-MCNC: 10.1 MG/DL (ref 8.3–10.6)
CHLORIDE BLD-SCNC: 101 MMOL/L (ref 99–110)
CO2: 24 MMOL/L (ref 21–32)
CREAT SERPL-MCNC: 1.2 MG/DL (ref 0.8–1.3)
EOSINOPHILS ABSOLUTE: 0.1 K/UL (ref 0–0.6)
EOSINOPHILS RELATIVE PERCENT: 1.8 %
GFR AFRICAN AMERICAN: >60
GFR NON-AFRICAN AMERICAN: 59
GLUCOSE BLD-MCNC: 203 MG/DL (ref 70–99)
HCT VFR BLD CALC: 48.3 % (ref 40.5–52.5)
HEMOGLOBIN: 15.9 G/DL (ref 13.5–17.5)
INR BLD: 0.94 (ref 0.88–1.12)
LYMPHOCYTES ABSOLUTE: 2 K/UL (ref 1–5.1)
LYMPHOCYTES RELATIVE PERCENT: 35.3 %
MCH RBC QN AUTO: 27.6 PG (ref 26–34)
MCHC RBC AUTO-ENTMCNC: 33 G/DL (ref 31–36)
MCV RBC AUTO: 83.7 FL (ref 80–100)
MONOCYTES ABSOLUTE: 0.3 K/UL (ref 0–1.3)
MONOCYTES RELATIVE PERCENT: 4.7 %
NEUTROPHILS ABSOLUTE: 3.3 K/UL (ref 1.7–7.7)
NEUTROPHILS RELATIVE PERCENT: 57.7 %
PDW BLD-RTO: 14.5 % (ref 12.4–15.4)
PLATELET # BLD: 253 K/UL (ref 135–450)
PMV BLD AUTO: 6.7 FL (ref 5–10.5)
POTASSIUM SERPL-SCNC: 4.9 MMOL/L (ref 3.5–5.1)
PROTHROMBIN TIME: 10.6 SEC (ref 9.9–12.7)
RBC # BLD: 5.78 M/UL (ref 4.2–5.9)
SODIUM BLD-SCNC: 140 MMOL/L (ref 136–145)
TOTAL PROTEIN: 6.7 G/DL (ref 6.4–8.2)
WBC # BLD: 5.7 K/UL (ref 4–11)

## 2022-02-01 PROCEDURE — 80053 COMPREHEN METABOLIC PANEL: CPT

## 2022-02-01 PROCEDURE — 86850 RBC ANTIBODY SCREEN: CPT

## 2022-02-01 PROCEDURE — 85730 THROMBOPLASTIN TIME PARTIAL: CPT

## 2022-02-01 PROCEDURE — U0003 INFECTIOUS AGENT DETECTION BY NUCLEIC ACID (DNA OR RNA); SEVERE ACUTE RESPIRATORY SYNDROME CORONAVIRUS 2 (SARS-COV-2) (CORONAVIRUS DISEASE [COVID-19]), AMPLIFIED PROBE TECHNIQUE, MAKING USE OF HIGH THROUGHPUT TECHNOLOGIES AS DESCRIBED BY CMS-2020-01-R: HCPCS

## 2022-02-01 PROCEDURE — 36415 COLL VENOUS BLD VENIPUNCTURE: CPT

## 2022-02-01 PROCEDURE — 86900 BLOOD TYPING SEROLOGIC ABO: CPT

## 2022-02-01 PROCEDURE — 85610 PROTHROMBIN TIME: CPT

## 2022-02-01 PROCEDURE — U0005 INFEC AGEN DETEC AMPLI PROBE: HCPCS

## 2022-02-01 PROCEDURE — 85025 COMPLETE CBC W/AUTO DIFF WBC: CPT

## 2022-02-01 PROCEDURE — 86901 BLOOD TYPING SEROLOGIC RH(D): CPT

## 2022-02-01 NOTE — TELEPHONE ENCOUNTER
[facility] Provider signed [facility] and has been faxed to home care agency.

## 2022-02-02 ENCOUNTER — OFFICE VISIT (OUTPATIENT)
Dept: FAMILY MEDICINE CLINIC | Age: 72
End: 2022-02-02
Payer: MEDICARE

## 2022-02-02 VITALS
HEART RATE: 71 BPM | HEIGHT: 68 IN | DIASTOLIC BLOOD PRESSURE: 70 MMHG | TEMPERATURE: 97.2 F | OXYGEN SATURATION: 97 % | SYSTOLIC BLOOD PRESSURE: 132 MMHG | BODY MASS INDEX: 32.81 KG/M2 | WEIGHT: 216.5 LBS

## 2022-02-02 DIAGNOSIS — R97.20 ELEVATED PSA: Primary | ICD-10-CM

## 2022-02-02 DIAGNOSIS — Z01.818 PRE-OP EXAM: ICD-10-CM

## 2022-02-02 LAB — SARS-COV-2: NOT DETECTED

## 2022-02-02 PROCEDURE — 99213 OFFICE O/P EST LOW 20 MIN: CPT | Performed by: NURSE PRACTITIONER

## 2022-02-02 PROCEDURE — 93000 ELECTROCARDIOGRAM COMPLETE: CPT | Performed by: NURSE PRACTITIONER

## 2022-02-02 NOTE — PROGRESS NOTES
Preoperative Consultation    Mila Franz  YOB: 1950    This patient presents to the office today for a preoperative consultation at the request of surgeon, Dr. Mulu Madison, who plans on performing robotic prostatectomy on February 3 at 0600. Planned anesthesia: General   Known anesthesia problems: None   Bleeding risk: No recent or remote history of abnormal bleeding  Personal or FH of DVT/PE: No      Patient Active Problem List   Diagnosis    Pneumonia    Acute respiratory failure with hypoxia ([address]    Pneumonia due to COVID-19 virus    Essential hypertension    Chronic hypoxemic respiratory failure (HCC)    Pulmonary fibrosis ([address]    LEILANI (obstructive sleep apnea)    Type 2 diabetes mellitus     Past Surgical History:   Procedure Laterality Date    APPENDECTOMY      SKIN BIOPSY      TONSILLECTOMY         No Known Allergies  No outpatient medications have been marked as taking for the 2/2/22 encounter (Office Visit) with JEAN Bangura NP. Social History     Tobacco Use    Smoking status: Never Smoker    Smokeless tobacco: Never Used   Substance Use Topics    Alcohol use: Yes     Comment: occas     Family History   Problem Relation Age of Onset    Diabetes Father     Stroke Father        Review of Systems  A comprehensive review of systems was negative except for what was noted in the HPI. Physical Exam   /70   Pulse 71   Temp 97.2 °F (36.2 °C)   Ht 5' 7.5\" (1.715 m)   Wt 216 lb 8 oz (98.2 kg)   SpO2 97%   BMI 33.41 kg/m²   Weight: 216 lb 8 oz (98.2 kg)   Constitutional: He is oriented to person, place, and time. He appears well-developed and well-nourished. No distress. HENT:   Head: Normocephalic and atraumatic. Mouth/Throat: Uvula is midline, oropharynx is clear and moist and mucous membranes are normal.   Eyes: Conjunctivae and EOM are normal. Pupils are equal, round, and reactive to light.    Neck: Trachea normal and normal range of motion. Neck supple. No JVD present. Carotid bruit is not present. No mass and no thyromegaly present. Cardiovascular: Normal rate, regular rhythm, normal heart sounds and intact distal pulses. Exam reveals no gallop and no friction rub. No murmur heard. Pulmonary/Chest: Effort normal and breath sounds normal. No respiratory distress. He has no wheezes. He has no rales. Abdominal: Soft. Normal aorta and bowel sounds are normal. He exhibits no distension and no mass. There is no hepatosplenomegaly. No tenderness. Musculoskeletal: He exhibits no edema and no tenderness. Neurological: He is alert and oriented to person, place, and time. He has normal strength. No cranial nerve deficit or sensory deficit. Coordination and gait normal.   Skin: Skin is warm and dry. No rash noted. No erythema. EKG Interpretation:  normal EKG, normal sinus rhythm, unchanged from previous tracings. Lab Review Yes, waiting on covid result       Assessment:       Mickie Rios was seen today for pre-op exam.    Diagnoses and all orders for this visit:    Elevated PSA    Pre-op exam  -     EKG 12 Lead      66 y.o. patient  approved for Surgery         Plan:     1. Preoperative workup as follows: ECG  2. Change in medication regimen before surgery: Hold all medications on morning of surgery  3. No contraindications to planned surgery    Electronically signed by JEAN Arambula NP on 2/2/22 at 1:55 PM EST     This dictation was generated by voice recognition computer software. Although all attempts are made to edit the dictation for accuracy, there may be errors in the transcription that are not intended.

## 2022-02-03 ENCOUNTER — ANESTHESIA (OUTPATIENT)
Dept: OPERATING ROOM | Age: 72
DRG: 708 | End: 2022-02-03
Payer: MEDICARE

## 2022-02-03 ENCOUNTER — ANESTHESIA EVENT (OUTPATIENT)
Dept: OPERATING ROOM | Age: 72
DRG: 708 | End: 2022-02-03
Payer: MEDICARE

## 2022-02-03 ENCOUNTER — HOSPITAL ENCOUNTER (INPATIENT)
Age: 72
LOS: 1 days | Discharge: HOME | DRG: 708 | End: 2022-02-04
Attending: UROLOGY | Admitting: UROLOGY
Payer: MEDICARE

## 2022-02-03 VITALS
SYSTOLIC BLOOD PRESSURE: 158 MMHG | TEMPERATURE: 95.9 F | RESPIRATION RATE: 10 BRPM | OXYGEN SATURATION: 100 % | DIASTOLIC BLOOD PRESSURE: 70 MMHG

## 2022-02-03 DIAGNOSIS — Z01.818 PREOP TESTING: Primary | ICD-10-CM

## 2022-02-03 DIAGNOSIS — C61 PROSTATE CANCER (HCC): ICD-10-CM

## 2022-02-03 LAB
ABO/RH: NORMAL
ANTIBODY SCREEN: NORMAL
GLUCOSE BLD-MCNC: 104 MG/DL (ref 70–99)
GLUCOSE BLD-MCNC: 136 MG/DL (ref 70–99)
PERFORMED ON: ABNORMAL
PERFORMED ON: ABNORMAL

## 2022-02-03 PROCEDURE — G0378 HOSPITAL OBSERVATION PER HR: HCPCS

## 2022-02-03 PROCEDURE — 6360000002 HC RX W HCPCS: Performed by: ANESTHESIOLOGY

## 2022-02-03 PROCEDURE — 2500000003 HC RX 250 WO HCPCS: Performed by: NURSE ANESTHETIST, CERTIFIED REGISTERED

## 2022-02-03 PROCEDURE — 2500000003 HC RX 250 WO HCPCS: Performed by: ANESTHESIOLOGY

## 2022-02-03 PROCEDURE — 2580000003 HC RX 258: Performed by: NURSE ANESTHETIST, CERTIFIED REGISTERED

## 2022-02-03 PROCEDURE — 36415 COLL VENOUS BLD VENIPUNCTURE: CPT

## 2022-02-03 PROCEDURE — 8E0W4CZ ROBOTIC ASSISTED PROCEDURE OF TRUNK REGION, PERCUTANEOUS ENDOSCOPIC APPROACH: ICD-10-PCS | Performed by: UROLOGY

## 2022-02-03 PROCEDURE — 6360000002 HC RX W HCPCS: Performed by: NURSE ANESTHETIST, CERTIFIED REGISTERED

## 2022-02-03 PROCEDURE — 6360000002 HC RX W HCPCS: Performed by: UROLOGY

## 2022-02-03 PROCEDURE — 86901 BLOOD TYPING SEROLOGIC RH(D): CPT

## 2022-02-03 PROCEDURE — 94150 VITAL CAPACITY TEST: CPT

## 2022-02-03 PROCEDURE — 3700000001 HC ADD 15 MINUTES (ANESTHESIA): Performed by: UROLOGY

## 2022-02-03 PROCEDURE — 2580000003 HC RX 258: Performed by: UROLOGY

## 2022-02-03 PROCEDURE — 6370000000 HC RX 637 (ALT 250 FOR IP): Performed by: UROLOGY

## 2022-02-03 PROCEDURE — 3600000019 HC SURGERY ROBOT ADDTL 15MIN: Performed by: UROLOGY

## 2022-02-03 PROCEDURE — 88307 TISSUE EXAM BY PATHOLOGIST: CPT

## 2022-02-03 PROCEDURE — S2900 ROBOTIC SURGICAL SYSTEM: HCPCS | Performed by: UROLOGY

## 2022-02-03 PROCEDURE — 1200000000 HC SEMI PRIVATE

## 2022-02-03 PROCEDURE — 2709999900 HC NON-CHARGEABLE SUPPLY: Performed by: UROLOGY

## 2022-02-03 PROCEDURE — 96372 THER/PROPH/DIAG INJ SC/IM: CPT

## 2022-02-03 PROCEDURE — A4217 STERILE WATER/SALINE, 500 ML: HCPCS | Performed by: UROLOGY

## 2022-02-03 PROCEDURE — 3700000000 HC ANESTHESIA ATTENDED CARE: Performed by: UROLOGY

## 2022-02-03 PROCEDURE — 86850 RBC ANTIBODY SCREEN: CPT

## 2022-02-03 PROCEDURE — 88305 TISSUE EXAM BY PATHOLOGIST: CPT

## 2022-02-03 PROCEDURE — 3600000009 HC SURGERY ROBOT BASE: Performed by: UROLOGY

## 2022-02-03 PROCEDURE — 0VT04ZZ RESECTION OF PROSTATE, PERCUTANEOUS ENDOSCOPIC APPROACH: ICD-10-PCS | Performed by: UROLOGY

## 2022-02-03 PROCEDURE — 88309 TISSUE EXAM BY PATHOLOGIST: CPT

## 2022-02-03 PROCEDURE — 88342 IMHCHEM/IMCYTCHM 1ST ANTB: CPT

## 2022-02-03 PROCEDURE — 94760 N-INVAS EAR/PLS OXIMETRY 1: CPT

## 2022-02-03 PROCEDURE — C9290 INJ, BUPIVACAINE LIPOSOME: HCPCS | Performed by: UROLOGY

## 2022-02-03 PROCEDURE — 86900 BLOOD TYPING SEROLOGIC ABO: CPT

## 2022-02-03 PROCEDURE — 88341 IMHCHEM/IMCYTCHM EA ADD ANTB: CPT

## 2022-02-03 PROCEDURE — 2500000003 HC RX 250 WO HCPCS: Performed by: UROLOGY

## 2022-02-03 PROCEDURE — 7100000000 HC PACU RECOVERY - FIRST 15 MIN: Performed by: UROLOGY

## 2022-02-03 PROCEDURE — 07TC4ZZ RESECTION OF PELVIS LYMPHATIC, PERCUTANEOUS ENDOSCOPIC APPROACH: ICD-10-PCS | Performed by: UROLOGY

## 2022-02-03 PROCEDURE — 7100000001 HC PACU RECOVERY - ADDTL 15 MIN: Performed by: UROLOGY

## 2022-02-03 RX ORDER — HYDROCODONE BITARTRATE AND ACETAMINOPHEN 5; 325 MG/1; MG/1
1 TABLET ORAL EVERY 6 HOURS PRN
Qty: 20 TABLET | Refills: 0 | Status: SHIPPED | OUTPATIENT
Start: 2022-02-03 | End: 2022-02-08

## 2022-02-03 RX ORDER — BUPIVACAINE HYDROCHLORIDE 5 MG/ML
INJECTION, SOLUTION EPIDURAL; INTRACAUDAL
Status: COMPLETED | OUTPATIENT
Start: 2022-02-03 | End: 2022-02-03

## 2022-02-03 RX ORDER — OXYCODONE HYDROCHLORIDE AND ACETAMINOPHEN 5; 325 MG/1; MG/1
1 TABLET ORAL
Status: DISCONTINUED | OUTPATIENT
Start: 2022-02-03 | End: 2022-02-03 | Stop reason: HOSPADM

## 2022-02-03 RX ORDER — KETAMINE HCL IN NACL, ISO-OSM 100MG/10ML
SYRINGE (ML) INJECTION PRN
Status: DISCONTINUED | OUTPATIENT
Start: 2022-02-03 | End: 2022-02-03 | Stop reason: SDUPTHER

## 2022-02-03 RX ORDER — HEPARIN SODIUM 5000 [USP'U]/ML
5000 INJECTION, SOLUTION INTRAVENOUS; SUBCUTANEOUS EVERY 8 HOURS SCHEDULED
Status: DISCONTINUED | OUTPATIENT
Start: 2022-02-03 | End: 2022-02-04 | Stop reason: HOSPADM

## 2022-02-03 RX ORDER — ONDANSETRON 2 MG/ML
4 INJECTION INTRAMUSCULAR; INTRAVENOUS EVERY 6 HOURS PRN
Status: DISCONTINUED | OUTPATIENT
Start: 2022-02-03 | End: 2022-02-04 | Stop reason: HOSPADM

## 2022-02-03 RX ORDER — HYDROMORPHONE HCL 110MG/55ML
0.5 PATIENT CONTROLLED ANALGESIA SYRINGE INTRAVENOUS EVERY 5 MIN PRN
Status: DISCONTINUED | OUTPATIENT
Start: 2022-02-03 | End: 2022-02-03 | Stop reason: HOSPADM

## 2022-02-03 RX ORDER — LIDOCAINE HYDROCHLORIDE 10 MG/ML
0.5 INJECTION, SOLUTION EPIDURAL; INFILTRATION; INTRACAUDAL; PERINEURAL ONCE
Status: DISCONTINUED | OUTPATIENT
Start: 2022-02-03 | End: 2022-02-03 | Stop reason: HOSPADM

## 2022-02-03 RX ORDER — LIDOCAINE HYDROCHLORIDE 20 MG/ML
INJECTION, SOLUTION EPIDURAL; INFILTRATION; INTRACAUDAL; PERINEURAL PRN
Status: DISCONTINUED | OUTPATIENT
Start: 2022-02-03 | End: 2022-02-03 | Stop reason: SDUPTHER

## 2022-02-03 RX ORDER — SENNA AND DOCUSATE SODIUM 50; 8.6 MG/1; MG/1
1 TABLET, FILM COATED ORAL 2 TIMES DAILY
Status: DISCONTINUED | OUTPATIENT
Start: 2022-02-03 | End: 2022-02-04 | Stop reason: HOSPADM

## 2022-02-03 RX ORDER — AMOXICILLIN 250 MG
1 CAPSULE ORAL 2 TIMES DAILY
Qty: 30 TABLET | Refills: 1 | Status: SHIPPED | OUTPATIENT
Start: 2022-02-03 | End: 2022-03-05

## 2022-02-03 RX ORDER — GLYCOPYRROLATE 1 MG/5 ML
SYRINGE (ML) INTRAVENOUS PRN
Status: DISCONTINUED | OUTPATIENT
Start: 2022-02-03 | End: 2022-02-03 | Stop reason: SDUPTHER

## 2022-02-03 RX ORDER — SUCCINYLCHOLINE/SOD CL,ISO/PF 200MG/10ML
SYRINGE (ML) INTRAVENOUS PRN
Status: DISCONTINUED | OUTPATIENT
Start: 2022-02-03 | End: 2022-02-03 | Stop reason: SDUPTHER

## 2022-02-03 RX ORDER — SODIUM CHLORIDE 9 MG/ML
25 INJECTION, SOLUTION INTRAVENOUS PRN
Status: DISCONTINUED | OUTPATIENT
Start: 2022-02-03 | End: 2022-02-04 | Stop reason: HOSPADM

## 2022-02-03 RX ORDER — HYDROMORPHONE HCL 110MG/55ML
PATIENT CONTROLLED ANALGESIA SYRINGE INTRAVENOUS PRN
Status: DISCONTINUED | OUTPATIENT
Start: 2022-02-03 | End: 2022-02-03 | Stop reason: SDUPTHER

## 2022-02-03 RX ORDER — LABETALOL HYDROCHLORIDE 5 MG/ML
5 INJECTION, SOLUTION INTRAVENOUS EVERY 10 MIN PRN
Status: DISCONTINUED | OUTPATIENT
Start: 2022-02-03 | End: 2022-02-03 | Stop reason: HOSPADM

## 2022-02-03 RX ORDER — HYDROMORPHONE HYDROCHLORIDE 1 MG/ML
0.25 INJECTION, SOLUTION INTRAMUSCULAR; INTRAVENOUS; SUBCUTANEOUS
Status: DISCONTINUED | OUTPATIENT
Start: 2022-02-03 | End: 2022-02-04 | Stop reason: HOSPADM

## 2022-02-03 RX ORDER — MAGNESIUM HYDROXIDE 1200 MG/15ML
LIQUID ORAL
Status: COMPLETED | OUTPATIENT
Start: 2022-02-03 | End: 2022-02-03

## 2022-02-03 RX ORDER — ATROPA BELLADONNA AND OPIUM 16.2; 3 MG/1; MG/1
30 SUPPOSITORY RECTAL EVERY 8 HOURS PRN
Status: DISCONTINUED | OUTPATIENT
Start: 2022-02-03 | End: 2022-02-04 | Stop reason: HOSPADM

## 2022-02-03 RX ORDER — SODIUM CHLORIDE 0.9 % (FLUSH) 0.9 %
10 SYRINGE (ML) INJECTION EVERY 12 HOURS SCHEDULED
Status: DISCONTINUED | OUTPATIENT
Start: 2022-02-03 | End: 2022-02-04 | Stop reason: HOSPADM

## 2022-02-03 RX ORDER — SODIUM CHLORIDE 0.9 % (FLUSH) 0.9 %
10 SYRINGE (ML) INJECTION PRN
Status: DISCONTINUED | OUTPATIENT
Start: 2022-02-03 | End: 2022-02-04 | Stop reason: HOSPADM

## 2022-02-03 RX ORDER — LIDOCAINE HYDROCHLORIDE 20 MG/ML
JELLY TOPICAL
Status: COMPLETED | OUTPATIENT
Start: 2022-02-03 | End: 2022-02-03

## 2022-02-03 RX ORDER — FENTANYL CITRATE 50 UG/ML
INJECTION, SOLUTION INTRAMUSCULAR; INTRAVENOUS PRN
Status: DISCONTINUED | OUTPATIENT
Start: 2022-02-03 | End: 2022-02-03 | Stop reason: SDUPTHER

## 2022-02-03 RX ORDER — ATROPA BELLADONNA AND OPIUM 16.2; 6 MG/1; MG/1
SUPPOSITORY RECTAL
Status: COMPLETED | OUTPATIENT
Start: 2022-02-03 | End: 2022-02-03

## 2022-02-03 RX ORDER — OXYCODONE HYDROCHLORIDE 5 MG/1
5 TABLET ORAL EVERY 4 HOURS PRN
Status: DISCONTINUED | OUTPATIENT
Start: 2022-02-03 | End: 2022-02-04 | Stop reason: HOSPADM

## 2022-02-03 RX ORDER — ONDANSETRON 2 MG/ML
INJECTION INTRAMUSCULAR; INTRAVENOUS PRN
Status: DISCONTINUED | OUTPATIENT
Start: 2022-02-03 | End: 2022-02-03 | Stop reason: SDUPTHER

## 2022-02-03 RX ORDER — PHENYLEPHRINE HCL IN 0.9% NACL 1 MG/10 ML
SYRINGE (ML) INTRAVENOUS PRN
Status: DISCONTINUED | OUTPATIENT
Start: 2022-02-03 | End: 2022-02-03 | Stop reason: SDUPTHER

## 2022-02-03 RX ORDER — HYDRALAZINE HYDROCHLORIDE 20 MG/ML
5 INJECTION INTRAMUSCULAR; INTRAVENOUS EVERY 10 MIN PRN
Status: DISCONTINUED | OUTPATIENT
Start: 2022-02-03 | End: 2022-02-03 | Stop reason: HOSPADM

## 2022-02-03 RX ORDER — PROPOFOL 10 MG/ML
INJECTION, EMULSION INTRAVENOUS PRN
Status: DISCONTINUED | OUTPATIENT
Start: 2022-02-03 | End: 2022-02-03 | Stop reason: SDUPTHER

## 2022-02-03 RX ORDER — HYDROMORPHONE HYDROCHLORIDE 1 MG/ML
0.5 INJECTION, SOLUTION INTRAMUSCULAR; INTRAVENOUS; SUBCUTANEOUS EVERY 4 HOURS PRN
Status: DISCONTINUED | OUTPATIENT
Start: 2022-02-03 | End: 2022-02-04 | Stop reason: HOSPADM

## 2022-02-03 RX ORDER — MEPERIDINE HYDROCHLORIDE 25 MG/ML
12.5 INJECTION INTRAMUSCULAR; INTRAVENOUS; SUBCUTANEOUS EVERY 5 MIN PRN
Status: DISCONTINUED | OUTPATIENT
Start: 2022-02-03 | End: 2022-02-03 | Stop reason: HOSPADM

## 2022-02-03 RX ORDER — ROCURONIUM BROMIDE 10 MG/ML
INJECTION, SOLUTION INTRAVENOUS PRN
Status: DISCONTINUED | OUTPATIENT
Start: 2022-02-03 | End: 2022-02-03 | Stop reason: SDUPTHER

## 2022-02-03 RX ORDER — SODIUM CHLORIDE 9 MG/ML
INJECTION, SOLUTION INTRAVENOUS CONTINUOUS PRN
Status: DISCONTINUED | OUTPATIENT
Start: 2022-02-03 | End: 2022-02-03 | Stop reason: SDUPTHER

## 2022-02-03 RX ORDER — ACETAMINOPHEN 325 MG/1
650 TABLET ORAL EVERY 6 HOURS
Status: DISCONTINUED | OUTPATIENT
Start: 2022-02-03 | End: 2022-02-04 | Stop reason: HOSPADM

## 2022-02-03 RX ORDER — SODIUM CHLORIDE 9 MG/ML
INJECTION, SOLUTION INTRAVENOUS CONTINUOUS
Status: DISCONTINUED | OUTPATIENT
Start: 2022-02-03 | End: 2022-02-03

## 2022-02-03 RX ORDER — DEXTROSE AND SODIUM CHLORIDE 5; .45 G/100ML; G/100ML
INJECTION, SOLUTION INTRAVENOUS CONTINUOUS
Status: DISCONTINUED | OUTPATIENT
Start: 2022-02-03 | End: 2022-02-04 | Stop reason: HOSPADM

## 2022-02-03 RX ORDER — DEXTROSE AND SODIUM CHLORIDE 5; .45 G/100ML; G/100ML
INJECTION, SOLUTION INTRAVENOUS
Status: DISPENSED
Start: 2022-02-03 | End: 2022-02-04

## 2022-02-03 RX ORDER — MAGNESIUM HYDROXIDE 1200 MG/15ML
LIQUID ORAL CONTINUOUS PRN
Status: COMPLETED | OUTPATIENT
Start: 2022-02-03 | End: 2022-02-03

## 2022-02-03 RX ORDER — ONDANSETRON 2 MG/ML
4 INJECTION INTRAMUSCULAR; INTRAVENOUS
Status: COMPLETED | OUTPATIENT
Start: 2022-02-03 | End: 2022-02-03

## 2022-02-03 RX ADMIN — ROCURONIUM BROMIDE 20 MG: 10 INJECTION, SOLUTION INTRAVENOUS at 09:13

## 2022-02-03 RX ADMIN — CEFAZOLIN 2000 MG: 10 INJECTION, POWDER, FOR SOLUTION INTRAVENOUS at 07:27

## 2022-02-03 RX ADMIN — LABETALOL HYDROCHLORIDE 5 MG: 5 INJECTION INTRAVENOUS at 11:36

## 2022-02-03 RX ADMIN — ROCURONIUM BROMIDE 10 MG: 10 INJECTION, SOLUTION INTRAVENOUS at 09:51

## 2022-02-03 RX ADMIN — Medication 100 MCG: at 07:43

## 2022-02-03 RX ADMIN — Medication 160 MG: at 07:37

## 2022-02-03 RX ADMIN — PROPOFOL 180 MG: 10 INJECTION, EMULSION INTRAVENOUS at 07:37

## 2022-02-03 RX ADMIN — HEPARIN SODIUM 5000 UNITS: 5000 INJECTION INTRAVENOUS; SUBCUTANEOUS at 13:35

## 2022-02-03 RX ADMIN — ONDANSETRON 4 MG: 2 INJECTION INTRAMUSCULAR; INTRAVENOUS at 07:57

## 2022-02-03 RX ADMIN — SUGAMMADEX 200 MG: 100 INJECTION, SOLUTION INTRAVENOUS at 10:30

## 2022-02-03 RX ADMIN — ROCURONIUM BROMIDE 40 MG: 10 INJECTION, SOLUTION INTRAVENOUS at 07:45

## 2022-02-03 RX ADMIN — HYDROMORPHONE HYDROCHLORIDE 0.5 MG: 2 INJECTION, SOLUTION INTRAMUSCULAR; INTRAVENOUS; SUBCUTANEOUS at 10:32

## 2022-02-03 RX ADMIN — FENTANYL CITRATE 100 MCG: 50 INJECTION, SOLUTION INTRAMUSCULAR; INTRAVENOUS at 07:37

## 2022-02-03 RX ADMIN — HEPARIN SODIUM 5000 UNITS: 5000 INJECTION INTRAVENOUS; SUBCUTANEOUS at 22:04

## 2022-02-03 RX ADMIN — SODIUM CHLORIDE: 9 INJECTION, SOLUTION INTRAVENOUS at 06:48

## 2022-02-03 RX ADMIN — ACETAMINOPHEN 650 MG: 325 TABLET ORAL at 13:35

## 2022-02-03 RX ADMIN — DEXTROSE AND SODIUM CHLORIDE: 5; 450 INJECTION, SOLUTION INTRAVENOUS at 12:57

## 2022-02-03 RX ADMIN — ROCURONIUM BROMIDE 20 MG: 10 INJECTION, SOLUTION INTRAVENOUS at 08:32

## 2022-02-03 RX ADMIN — Medication 100 MCG: at 08:22

## 2022-02-03 RX ADMIN — ONDANSETRON 4 MG: 2 INJECTION INTRAMUSCULAR; INTRAVENOUS at 11:14

## 2022-02-03 RX ADMIN — Medication 10 ML: at 22:05

## 2022-02-03 RX ADMIN — ROCURONIUM BROMIDE 10 MG: 10 INJECTION, SOLUTION INTRAVENOUS at 07:37

## 2022-02-03 RX ADMIN — SODIUM CHLORIDE: 9 INJECTION, SOLUTION INTRAVENOUS at 07:27

## 2022-02-03 RX ADMIN — ONDANSETRON 4 MG: 2 INJECTION INTRAMUSCULAR; INTRAVENOUS at 10:10

## 2022-02-03 RX ADMIN — Medication 30 MG: at 08:10

## 2022-02-03 RX ADMIN — SENNOSIDES AND DOCUSATE SODIUM 1 TABLET: 50; 8.6 TABLET ORAL at 22:04

## 2022-02-03 RX ADMIN — HYDRALAZINE HYDROCHLORIDE 5 MG: 20 INJECTION INTRAMUSCULAR; INTRAVENOUS at 11:52

## 2022-02-03 RX ADMIN — Medication 0.2 MG: at 08:07

## 2022-02-03 RX ADMIN — SENNOSIDES AND DOCUSATE SODIUM 1 TABLET: 50; 8.6 TABLET ORAL at 13:34

## 2022-02-03 RX ADMIN — LIDOCAINE HYDROCHLORIDE 100 MG: 20 INJECTION, SOLUTION EPIDURAL; INFILTRATION; INTRACAUDAL; PERINEURAL at 07:37

## 2022-02-03 ASSESSMENT — PULMONARY FUNCTION TESTS
PIF_VALUE: 35
PIF_VALUE: 32
PIF_VALUE: 35
PIF_VALUE: 30
PIF_VALUE: 19
PIF_VALUE: 29
PIF_VALUE: 19
PIF_VALUE: 33
PIF_VALUE: 33
PIF_VALUE: 35
PIF_VALUE: 33
PIF_VALUE: 34
PIF_VALUE: 35
PIF_VALUE: 33
PIF_VALUE: 19
PIF_VALUE: 35
PIF_VALUE: 35
PIF_VALUE: 34
PIF_VALUE: 5
PIF_VALUE: 34
PIF_VALUE: 34
PIF_VALUE: 33
PIF_VALUE: 34
PIF_VALUE: 34
PIF_VALUE: 26
PIF_VALUE: 34
PIF_VALUE: 34
PIF_VALUE: 35
PIF_VALUE: 18
PIF_VALUE: 17
PIF_VALUE: 34
PIF_VALUE: 2
PIF_VALUE: 35
PIF_VALUE: 19
PIF_VALUE: 19
PIF_VALUE: 33
PIF_VALUE: 19
PIF_VALUE: 34
PIF_VALUE: 20
PIF_VALUE: 5
PIF_VALUE: 19
PIF_VALUE: 18
PIF_VALUE: 35
PIF_VALUE: 34
PIF_VALUE: 34
PIF_VALUE: 35
PIF_VALUE: 20
PIF_VALUE: 19
PIF_VALUE: 35
PIF_VALUE: 5
PIF_VALUE: 33
PIF_VALUE: 33
PIF_VALUE: 19
PIF_VALUE: 33
PIF_VALUE: 35
PIF_VALUE: 36
PIF_VALUE: 34
PIF_VALUE: 33
PIF_VALUE: 34
PIF_VALUE: 35
PIF_VALUE: 32
PIF_VALUE: 33
PIF_VALUE: 34
PIF_VALUE: 32
PIF_VALUE: 2
PIF_VALUE: 36
PIF_VALUE: 35
PIF_VALUE: 34
PIF_VALUE: 33
PIF_VALUE: 17
PIF_VALUE: 24
PIF_VALUE: 35
PIF_VALUE: 19
PIF_VALUE: 35
PIF_VALUE: 3
PIF_VALUE: 17
PIF_VALUE: 19
PIF_VALUE: 17
PIF_VALUE: 34
PIF_VALUE: 29
PIF_VALUE: 33
PIF_VALUE: 35
PIF_VALUE: 34
PIF_VALUE: 34
PIF_VALUE: 33
PIF_VALUE: 34
PIF_VALUE: 5
PIF_VALUE: 35
PIF_VALUE: 3
PIF_VALUE: 35
PIF_VALUE: 20
PIF_VALUE: 34
PIF_VALUE: 34
PIF_VALUE: 35
PIF_VALUE: 33
PIF_VALUE: 34
PIF_VALUE: 3
PIF_VALUE: 34
PIF_VALUE: 35
PIF_VALUE: 34
PIF_VALUE: 16
PIF_VALUE: 33
PIF_VALUE: 17
PIF_VALUE: 34
PIF_VALUE: 19
PIF_VALUE: 17
PIF_VALUE: 0
PIF_VALUE: 35
PIF_VALUE: 17
PIF_VALUE: 19
PIF_VALUE: 34
PIF_VALUE: 3
PIF_VALUE: 34
PIF_VALUE: 33
PIF_VALUE: 35
PIF_VALUE: 34
PIF_VALUE: 35
PIF_VALUE: 35
PIF_VALUE: 17
PIF_VALUE: 0
PIF_VALUE: 35
PIF_VALUE: 33
PIF_VALUE: 2
PIF_VALUE: 35
PIF_VALUE: 19
PIF_VALUE: 17
PIF_VALUE: 34
PIF_VALUE: 35
PIF_VALUE: 34
PIF_VALUE: 19
PIF_VALUE: 35
PIF_VALUE: 16
PIF_VALUE: 35
PIF_VALUE: 3
PIF_VALUE: 34
PIF_VALUE: 18
PIF_VALUE: 35
PIF_VALUE: 3
PIF_VALUE: 33
PIF_VALUE: 19
PIF_VALUE: 0
PIF_VALUE: 35
PIF_VALUE: 19
PIF_VALUE: 34
PIF_VALUE: 32
PIF_VALUE: 5
PIF_VALUE: 34
PIF_VALUE: 34
PIF_VALUE: 35
PIF_VALUE: 33
PIF_VALUE: 34
PIF_VALUE: 33
PIF_VALUE: 34
PIF_VALUE: 17
PIF_VALUE: 34
PIF_VALUE: 3
PIF_VALUE: 34
PIF_VALUE: 17
PIF_VALUE: 35
PIF_VALUE: 20
PIF_VALUE: 35
PIF_VALUE: 34
PIF_VALUE: 35
PIF_VALUE: 34
PIF_VALUE: 1
PIF_VALUE: 17
PIF_VALUE: 35
PIF_VALUE: 35
PIF_VALUE: 33
PIF_VALUE: 34
PIF_VALUE: 34
PIF_VALUE: 33
PIF_VALUE: 36
PIF_VALUE: 33
PIF_VALUE: 34
PIF_VALUE: 34
PIF_VALUE: 3
PIF_VALUE: 34
PIF_VALUE: 1
PIF_VALUE: 34
PIF_VALUE: 35
PIF_VALUE: 34
PIF_VALUE: 35
PIF_VALUE: 16
PIF_VALUE: 19
PIF_VALUE: 6
PIF_VALUE: 35
PIF_VALUE: 35
PIF_VALUE: 33

## 2022-02-03 ASSESSMENT — PAIN SCALES - GENERAL
PAINLEVEL_OUTOF10: 0

## 2022-02-03 ASSESSMENT — PAIN - FUNCTIONAL ASSESSMENT: PAIN_FUNCTIONAL_ASSESSMENT: 0-10

## 2022-02-03 NOTE — OP NOTE
Urology Operative Report  [facility]    Provider: Tobin Rodriguez MD MD Patient ID:  Admission Date: 2/3/2022 Name: Dee Workman Date: 2/3/2022  MRN: [id number]   Patient Location: OR/NONE : 1950  Attending: Tobin Rodriguez MD Date of Service: 2/3/2022  PCP: JEAN Arambula NP     Date of Operation: 2/3/2022     Preoperative Diagnosis: Prostate Cancer    Postoperative Diagnosis: same    Procedure:    1. Robotic assisted laparoscopic radical prostatectomy  2. Right side pelvic lymphadenectomy  3. Left side pelvic lymphadenectomy  4. Lysis of adhesions (45 minutes total time)    Surgeon:   Tobin Rodriguez MD, CHRISTIAN    Anesthesia: General endotracheal anesthesia    Indications: Claudia Fleming is a 66 y.o. male who presents for the above named surgery. Informed consent was obtained and the risks, benefits, and details of the procedure were explained to the patient who elected to proceed. Details of Procedure:  After informed consent was obtained, making the patient aware of the risks, benefits and alternatives to the procedure, he was taken back to the surgical suite and positioned in a supine position on the surgical table. SCDs were placed on the lower extremities. General anesthesia was induced, and he was transferred to a dorsal lithotomy position. All pressure points were carefully padded. His genitalia and abdomen were prepped and draped in the usual sterile fashion. A timeout procedure was performed, properly identifying the patient, procedure, and operative site. A manjarrez catheter was placed and the bladder was drained. A supraumbilical incision was made, and a Veress needle was used to introduce pneumoperitoneum requiring 1 attempt(s). A camera port was then introduced into the abdomen, and the intra-abdominal contents were inspected for any sign of injury. There was none.  There were extensive adhesions from his prior appendectomy needs were carefully removed with laparoscopic resection. This took approximately 45 minutes. The laparoscopic ports were then placed in the usual fashion, two 8 mm ports in the left lower quadrant, one 8 mm port in the right lower quadrant, an assistant port in the lateral right lower quadrant, and a 5 mm port in the right upper quadrant. The patient was put in steep Trendelenburg position and the robot was docked. The laparoscopic instruments were introduced into the abdomen under direct vision. A retrovesical approach to the prostate was used. An incision was made in the peritoneum and the bilateral seminal vesicles and vas deferens were identified in the midline. The vas was followed out laterally and cut. The seminal vesicles were freed from all surrounding attachments. Care was taken on the side of attempted nerve sparing to avoid using electrical energy. Denonvier's fascia was incised and the posterior plane of dissection extended distally to the apex of the prostate. The bladder was then dropped from its retropubic location and bladder attachments were taken down until the endopelvic fascia was identified. The endopelvic fascia was incised and lateral prostate attachments taken down allowing completion of the lateral dissection of the prostate. This was performed bilaterally. The anterior prostate fat was cleaned off the prostate and removed by the bedside assistant. A dorsal vein stitch of 2-0 vicryl was used to tie off the vascular complex. At this point, attention was turned to the bladder neck. The prostato-vesicle junction was identified and the Everett balloon was deflated. The bladder neck was opened using the monopolar scissors. Care was taken to preserve the bladder neck. The urethral catheter was pulled back and the prostate elevated under tension using the fourth arm. The posterior bladder neck was then divided until the bilateral seminal vesicles and bilateral vas deferens were identified in the midline.  They Everett catheter was placed into the bladder and the balloon was inflated with 10mL of sterile water. The bladder was irrigated with 150 mL of normal saline. There was no evidence of any leakage. The catheter was allowed to remain in the bladder. Hemostasis was again ensured. The midline incision was extended, allowing delivery of the specimen bags through the midline. The midline incision was closed with 0-PDS interrupted stitches for the fascial level followed by 3-0 Vicryl deep dermal stitches. All incisions were closed with 4-0 monofilament and dermabond. The new 18-Turkmen Everett catheter had a statLock positioned. At the end of the procedure all needle, lap, instrument and sponge counts were correct. The patient tolerated the procedure well, was extubated without issue in the operating room, and was transported to the PACU in stable condition. Findings: There did appear to be an adequate surgical margin. There was a watertight urethral vesicle anastomosis. Estimated Blood Loss: Approximately 100 mL                  Drains:   18 Fr urethral Everett catheter to gravity drainage          Specimens:   1. Prostate, bilateral seminal vesicles, and anterior prostate fat. 2. Right side pelvic lymph nodes. 3. Left side pelvic lymph nodes. Complications: none apparent           Disposition:  PACU - hemodynamically stable.     Plan: Follow-up pathology, continue Everett catheter for 1 week           Juan Francisco Garcia MD, [facility]  2/3/2022

## 2022-02-03 NOTE — PROGRESS NOTES
Pt noted to have skin sensitivity along his arms and legs. Slight blotching noted  along abdomen prior to shaving and redness at tourniquet site post removal. OR RN supervisor notified and RN circulator notified. CHG not completed due to skin sensitivity.

## 2022-02-03 NOTE — PROGRESS NOTES
Assessment completed. Patient brought to unit via stretcher from PACU, drowsy at times, but able to complete admission assessment. Denied any pain. Sx incisions to abd CDI. Everett in place and draining clear yellow urine. Medications administered as ordered. POC and education reviewed with patient and spouse and mutually agreed upon. All needs met at this time. Call light in reach. Spouse at bedside. Will continue to monitor.

## 2022-02-03 NOTE — PROGRESS NOTES
Pt arrived to PACU from OR, VSS, pt arouses to voice. Laparoscopic incision sites x6 are CDI, ice applied. Will continue to monitor.

## 2022-02-03 NOTE — ANESTHESIA PRE PROCEDURE
Department of Anesthesiology  Preprocedure Note       Name:  Karsten Osei   Age:  66 y.o.  :  1950                                          MRN:  [id number]         Date:  2/3/2022      Surgeon: Saundra Loo):  Nayana Lugo MD    Procedure: Procedure(s):  ROBOTIC LAPAROSCOPIC RADICAL PROSTATECTOMY WITH BILATERAL LYMPH NODES DISSECTION WITH BILATERAL NERVE SPARING    Medications prior to admission:   Prior to Admission medications    Not on File       Current medications:    Current Facility-Administered Medications   Medication Dose Route Frequency Provider Last Rate Last Admin    ceFAZolin (ANCEF) 2000 mg in dextrose 5 % 50 mL IVPB  2,000 mg IntraVENous On Call to Nieves Leija MD        lidocaine PF 1 % injection 0.5 mL  0.5 mL SubCUTAneous Once Nayana Lugo MD        0.9 % sodium chloride infusion   IntraVENous Continuous Nayana Lugo MD           Allergies:  No Known Allergies    Problem List:    Patient Active Problem List   Diagnosis Code    Pneumonia J18.9    Acute respiratory failure with hypoxia ([facility] J96.01    Pneumonia due to COVID-19 virus U07.1, J12.82    Essential hypertension I10    Chronic hypoxemic respiratory failure (HCC) J96.11    Pulmonary fibrosis ([facility] J84.10    LEILANI (obstructive sleep apnea) G47.33    Type 2 diabetes mellitus E11.9       Past Medical History:        Diagnosis Date    Essential hypertension 2021    Pneumonia 2021    Type 2 diabetes mellitus 2022       Past Surgical History:        Procedure Laterality Date    APPENDECTOMY      SKIN BIOPSY      TONSILLECTOMY         Social History:    Social History     Tobacco Use    Smoking status: Never Smoker    Smokeless tobacco: Never Used   Substance Use Topics    Alcohol use: Yes     Comment: occas                                Counseling given: Not Answered      Vital Signs (Current):   Vitals:    22 1450 22 0609   BP:  (!) 149/79   Pulse:  78   Resp:  18   Temp: 97.9 °F (36.6 °C)   TempSrc:  Tympanic   SpO2:  98%   Weight: 211 lb (95.7 kg) 215 lb (97.5 kg)   Height: 5' 7\" (1.702 m)                                               BP Readings from Last 3 Encounters:   02/03/22 (!) 149/79   02/02/22 132/70   10/07/21 138/72       NPO Status: Time of last liquid consumption: 2100                        Time of last solid consumption: 2100                        Date of last liquid consumption: 02/02/22                        Date of last solid food consumption: 02/01/22    BMI:   Wt Readings from Last 3 Encounters:   02/03/22 215 lb (97.5 kg)   02/02/22 216 lb 8 oz (98.2 kg)   10/07/21 213 lb 12.8 oz (97 kg)     Body mass index is 33.18 kg/m².     CBC:   Lab Results   Component Value Date    WBC 5.7 02/01/2022    RBC 5.78 02/01/2022    HGB 15.9 02/01/2022    HCT 48.3 02/01/2022    MCV 83.7 02/01/2022    RDW 14.5 02/01/2022     02/01/2022       CMP:   Lab Results   Component Value Date     02/01/2022    K 4.9 02/01/2022    K 4.2 08/23/2021     02/01/2022    CO2 24 02/01/2022    BUN 22 02/01/2022    CREATININE 1.2 02/01/2022    GFRAA >60 02/01/2022    GFRAA >60 12/03/2010    AGRATIO 1.6 02/01/2022    LABGLOM 59 02/01/2022    GLUCOSE 203 02/01/2022    PROT 6.7 02/01/2022    CALCIUM 10.1 02/01/2022    BILITOT 0.3 02/01/2022    ALKPHOS 81 02/01/2022    AST 26 02/01/2022    ALT 43 02/01/2022       POC Tests:   Recent Labs     02/03/22  0631   POCGLU 104*       Coags:   Lab Results   Component Value Date    PROTIME 10.6 02/01/2022    INR 0.94 02/01/2022    APTT 32.7 02/01/2022       HCG (If Applicable): No results found for: PREGTESTUR, PREGSERUM, HCG, HCGQUANT     ABGs:   Lab Results   Component Value Date    PHART 7.461 08/20/2021    PO2ART 64.7 08/20/2021    ISA5GXU 30.5 08/20/2021    SHV8HHM 21.7 08/20/2021    BEART -1.0 08/20/2021    L5BWXHCF 93.2 08/20/2021        Type & Screen (If Applicable):  No results found for: LABABO, LABRH    Drug/Infectious Status (If Applicable):  No results found for: HIV, HEPCAB    COVID-19 Screening (If Applicable):   Lab Results   Component Value Date    COVID19 Not Detected 02/01/2022           Anesthesia Evaluation  Patient summary reviewed and Nursing notes reviewed  Airway: Mallampati: II        Dental:          Pulmonary:   (+) sleep apnea:                             Cardiovascular:    (+) hypertension:,                   Neuro/Psych:               GI/Hepatic/Renal:             Endo/Other:    (+) Diabetes, . Abdominal:             Vascular:           Other Findings:             Anesthesia Plan      general     ASA 2                                 Eun Molina MD   2/3/2022

## 2022-02-03 NOTE — ANESTHESIA POSTPROCEDURE EVALUATION
Department of Anesthesiology  Postprocedure Note    Patient: Beck Masterson  MRN: [id number]  YOB: 1950  Date of evaluation: 2/3/2022  Time:  10:51 AM     Procedure Summary     Date: 02/03/22 Room / Location: [address]    Anesthesia Start: 0730 Anesthesia Stop:     Procedure: ROBOTIC LAPAROSCOPIC RADICAL PROSTATECTOMY WITH BILATERAL LYMPH NODES DISSECTION WITH BILATERAL NERVE SPARING (N/A Abdomen) Diagnosis: (C61 MALIGNANT NEOPLASM OF PROSTATE)    Surgeons: Park Clemens MD Responsible Provider: Ling Izaguirre MD    Anesthesia Type: general ASA Status: 2          Anesthesia Type: No value filed. Shannon Phase I: Shannon Score: 10    Shanonn Phase II:      Last vitals: Reviewed and per EMR flowsheets.        Anesthesia Post Evaluation    Patient location during evaluation: PACU  Patient participation: complete - patient participated  Level of consciousness: awake  Airway patency: patent  Nausea & Vomiting: no nausea and no vomiting  Complications: no  Cardiovascular status: blood pressure returned to baseline  Respiratory status: acceptable  Hydration status: euvolemic

## 2022-02-03 NOTE — PROGRESS NOTES
Teaching / education initiated regarding perioperative experience, expectations, and pain management during stay. Patient verbalized understanding. VSS. Pt alert and oriented. All questions answered thus far.

## 2022-02-03 NOTE — PROGRESS NOTES
Incentive Spirometry education and demonstration completed by Respiratory Therapy Yes      Response to education: Excellent     Teaching Time: 5 minutes    Minimum Predicted Vital Capacity - 661 mL. Patient's Actual Vital Capacity - 700 mL. Turning over to Nursing for routine follow-up Yes.     Comments: IS goal met IS turned to nursing    Electronically signed by Sidra Britton on 2/3/2022 at 4:43 PM

## 2022-02-03 NOTE — PROGRESS NOTES
Phase 1 complete, pt seen by anesthesiologist. VSS, pt resting comfortably. Incision sites x6 are CDI, ice applied. Will transfer to [address] [facility], family updated.

## 2022-02-04 VITALS
BODY MASS INDEX: 33.74 KG/M2 | HEIGHT: 67 IN | TEMPERATURE: 98.3 F | DIASTOLIC BLOOD PRESSURE: 56 MMHG | OXYGEN SATURATION: 94 % | WEIGHT: 215 LBS | HEART RATE: 67 BPM | SYSTOLIC BLOOD PRESSURE: 134 MMHG | RESPIRATION RATE: 16 BRPM

## 2022-02-04 LAB
ANION GAP SERPL CALCULATED.3IONS-SCNC: 14 MMOL/L (ref 3–16)
BUN BLDV-MCNC: 18 MG/DL (ref 7–20)
CALCIUM SERPL-MCNC: 8.9 MG/DL (ref 8.3–10.6)
CHLORIDE BLD-SCNC: 105 MMOL/L (ref 99–110)
CO2: 20 MMOL/L (ref 21–32)
CREAT SERPL-MCNC: 1.2 MG/DL (ref 0.8–1.3)
GFR AFRICAN AMERICAN: >60
GFR NON-AFRICAN AMERICAN: 59
GLUCOSE BLD-MCNC: 126 MG/DL (ref 70–99)
HCT VFR BLD CALC: 41.1 % (ref 40.5–52.5)
HEMOGLOBIN: 13.7 G/DL (ref 13.5–17.5)
POTASSIUM SERPL-SCNC: 4.4 MMOL/L (ref 3.5–5.1)
SODIUM BLD-SCNC: 139 MMOL/L (ref 136–145)

## 2022-02-04 PROCEDURE — 80048 BASIC METABOLIC PNL TOTAL CA: CPT

## 2022-02-04 PROCEDURE — G0378 HOSPITAL OBSERVATION PER HR: HCPCS

## 2022-02-04 PROCEDURE — 96372 THER/PROPH/DIAG INJ SC/IM: CPT

## 2022-02-04 PROCEDURE — 85014 HEMATOCRIT: CPT

## 2022-02-04 PROCEDURE — 85018 HEMOGLOBIN: CPT

## 2022-02-04 PROCEDURE — 6370000000 HC RX 637 (ALT 250 FOR IP): Performed by: UROLOGY

## 2022-02-04 PROCEDURE — 6360000002 HC RX W HCPCS: Performed by: UROLOGY

## 2022-02-04 PROCEDURE — 2580000003 HC RX 258: Performed by: UROLOGY

## 2022-02-04 RX ADMIN — HEPARIN SODIUM 5000 UNITS: 5000 INJECTION INTRAVENOUS; SUBCUTANEOUS at 06:34

## 2022-02-04 RX ADMIN — Medication 10 ML: at 10:12

## 2022-02-04 RX ADMIN — SENNOSIDES AND DOCUSATE SODIUM 1 TABLET: 50; 8.6 TABLET ORAL at 09:23

## 2022-02-04 ASSESSMENT — PAIN DESCRIPTION - PAIN TYPE: TYPE: ACUTE PAIN

## 2022-02-04 ASSESSMENT — PAIN DESCRIPTION - LOCATION: LOCATION: ABDOMEN

## 2022-02-04 ASSESSMENT — PAIN DESCRIPTION - ORIENTATION: ORIENTATION: MID

## 2022-02-04 ASSESSMENT — PAIN SCALES - GENERAL: PAINLEVEL_OUTOF10: 3

## 2022-02-04 NOTE — PLAN OF CARE
Problem: Coping:  Goal: Expressions of feelings of enhanced comfort will increase  Description: Expressions of feelings of enhanced comfort will increase  Outcome: Ongoing     Problem: Urinary Elimination:  Goal: Ability to achieve a balanced intake and output will improve  Description: Ability to achieve a balanced intake and output will improve  Outcome: Ongoing  Goal: Ability to recognize the need to void and respond appropriately will improve  Description: Ability to recognize the need to void and respond appropriately will improve  Outcome: Ongoing  Goal: Will remain free from infection  Description: Will remain free from infection  Outcome: Ongoing     Problem: Cardiac:  Goal: Hemodynamic stability will improve  Description: Hemodynamic stability will improve  Outcome: Ongoing  Goal: Complications related to the disease process, condition or treatment will be avoided or minimized  Description: Complications related to the disease process, condition or treatment will be avoided or minimized  Outcome: Ongoing  Goal: Cerebral tissue perfusion will improve  Description: Cerebral tissue perfusion will improve  Outcome: Ongoing     Problem: Infection - Surgical Site:  Goal: Will show no infection signs and symptoms  Description: Will show no infection signs and symptoms  Outcome: Ongoing

## 2022-02-04 NOTE — PROGRESS NOTES
Assessment completed. Patient sitting up in bed, alert and oriented and able to make all his needs known. Sx incisions to abd CDI, well approximated and no drainage noted. Manjarrez in place and draining clear yellow urine. Medications administered as ordered. C/o pain to abd but tolerable for patient and refused pain medications. All needs met at this time. Call light in reach. Will continue to monitor. 1115: Discharge instructions reviewed with patient along with manjarrez cath care and all questions answered. 1130: Patient discharged via w/c with all personal belongings and wife at side.

## 2022-02-04 NOTE — PLAN OF CARE
Problem: Coping:  Goal: Expressions of feelings of enhanced comfort will increase  Description: Expressions of feelings of enhanced comfort will increase  2/4/2022 1104 by Rhea Thomson RN  Outcome: Completed  2/4/2022 0959 by Rhea Thomson RN  Outcome: Ongoing  2/4/2022 0053 by Yari Carlson RN  Outcome: Ongoing     Problem: Urinary Elimination:  Goal: Ability to achieve a balanced intake and output will improve  Description: Ability to achieve a balanced intake and output will improve  2/4/2022 1104 by Rhea Thomson RN  Outcome: Completed  2/4/2022 0959 by Rhea Thomson RN  Outcome: Ongoing  2/4/2022 0053 by Yari Carlson RN  Outcome: Ongoing  Goal: Ability to recognize the need to void and respond appropriately will improve  Description: Ability to recognize the need to void and respond appropriately will improve  2/4/2022 1104 by Rhea Thomson RN  Outcome: Completed  2/4/2022 0959 by Rhea Thomson RN  Outcome: Ongoing  2/4/2022 0053 by Yari Carlson RN  Outcome: Ongoing  Goal: Will remain free from infection  Description: Will remain free from infection  2/4/2022 1104 by Rhea Thomson RN  Outcome: Completed  2/4/2022 0959 by Rhea Thomson RN  Outcome: Ongoing  2/4/2022 0053 by Yari Carlson RN  Outcome: Ongoing     Problem: Cardiac:  Goal: Hemodynamic stability will improve  Description: Hemodynamic stability will improve  2/4/2022 1102 by Rhea Thomson RN  Outcome: Completed  2/4/2022 0959 by Rhea Thomson RN  Outcome: Ongoing  2/4/2022 0053 by Yari Carlson RN  Outcome: Ongoing  Goal: Complications related to the disease process, condition or treatment will be avoided or minimized  Description: Complications related to the disease process, condition or treatment will be avoided or minimized  2/4/2022 1102 by Rhea Thomson RN  Outcome: Completed  2/4/2022 0959 by Rhea Thomson RN  Outcome: Ongoing  2/4/2022 0053 by Yari Carlson RN  Outcome: Ongoing  Goal: Cerebral tissue perfusion will improve  Description: Cerebral tissue perfusion will improve  2/4/2022 1102 by Etta Delgado RN  Outcome: Completed  2/4/2022 0959 by Etta Delgado RN  Outcome: Ongoing  2/4/2022 0053 by Jared Ramirez RN  Outcome: Ongoing     Problem: Pain:  Goal: Pain level will decrease  Description: Pain level will decrease  2/4/2022 1102 by Etta Delgado RN  Outcome: Completed  2/4/2022 0959 by Etta Delgado RN  Outcome: Ongoing  Goal: Control of acute pain  Description: Control of acute pain  2/4/2022 1102 by Etta Delgado RN  Outcome: Completed  2/4/2022 0959 by Etta Delgado RN  Outcome: Ongoing  Goal: Control of chronic pain  Description: Control of chronic pain  2/4/2022 1102 by Etta Delgado RN  Outcome: Completed  2/4/2022 0959 by Etta Delgado RN  Outcome: Ongoing     Problem: Infection - Surgical Site:  Goal: Will show no infection signs and symptoms  Description: Will show no infection signs and symptoms  2/4/2022 1102 by Etta Delgado RN  Outcome: Completed  2/4/2022 0959 by Etta Delgado RN  Outcome: Ongoing  2/4/2022 0053 by Jared Ramirez RN  Outcome: Ongoing

## 2022-02-04 NOTE — DISCHARGE SUMMARY
Urology Discharge Summary  [facility]    Provider: William Washington MD MD Patient ID:  Admission Date: 2/3/2022 Name: Mariella Fountain Date: 2/3/2022 MRN: [id number]   Patient Location: [id number] : 1950  Attending: William Washington MD Date of Service: 2022  PCP: JEAN Zabala NP     Discharge date: 2022    Discharge Diagnoses:   1. Preop testing    2. Prostate cancer [facility])        Reason for Hospitalization: Miracle Castillo is a 66 y.o. male who was admitted post surgery. Operations/Procedures Performed:   1. RALP/BPLND    Discharge Condition: Stable    Hospital Course: The patient was admitted on 2/3/2022 and underwent the above mentioned procedure(s). He tolerated the procedure well with no apparent complications. Please see full operative report for further details regarding the operation. Postoperatively the patient remained in stable condition. Pain was controlled post-operatively with oral and IV medication. Diet was advanced and this was tolerated well. At the time of discharge, the patient was tolerating oral food and hydration, was ambulating without difficulty, and pain was controlled on oral medications. The patient was determined to be suitable for discharge and the patient felt comfortable with that decision. Patient was discharged in good condition. Separate discharge instructions provided to the patient or their caregiver. Consults: None     Significant Diagnostic findings: See operative reports    Discharge Exam:  Blood pressure (!) 134/56, pulse 67, temperature 98.3 °F (36.8 °C), temperature source Oral, resp. rate 16, height 5' 7\" (1.702 m), weight 215 lb (97.5 kg), SpO2 94 %.   Gen - NAD, AOx3  CV - RRR  Resp - CTAB  Abd - soft, NT/ND, inc c/d/i  Ext - warm, well-perfused    See progress note exam on day of discharge    Disposition: Discharged home in good condition    Discharge Medications:  Current Discharge Medication List START taking these medications    Details   HYDROcodone-acetaminophen (NORCO) 5-325 MG per tablet Take 1 tablet by mouth every 6 hours as needed for Pain for up to 5 days. Intended supply: 5 days. Take lowest dose possible to manage pain. Qty: 20 tablet, Refills: 0    Comments: Reduce doses taken as pain becomes manageable  Associated Diagnoses: Prostate cancer ([facility] [address]      senna-docusate (PERICOLACE) 8.6-50 MG per tablet Take 1 tablet by mouth 2 times daily For constipation while on pain medication. Qty: 30 tablet, Refills: 1             Follow up Appointment:  Please call [phone] to schedule a follow up appointment with Dr. Mulu Madison in 4-6 weeks. Total time spent reviewing discharge plan with patient/caregivers, preparation of the discharge record, filling prescriptions, and arranging post discharge plan was 15-20 minutes.     Polina Nieto MD, MD, [facility]  2/4/2022

## 2022-02-04 NOTE — CARE COORDINATION
Discharge Planning Note:      Chart reviewed and it appears that patient has minimal needs for discharge at this time. Discussed with patients nurse and requested that case management be notified if discharge needs are identified. Case management will continue to follow progress and update discharge plan as needed.       DEE Hayward RN      Phone: [phone]

## 2022-02-04 NOTE — PLAN OF CARE
Problem: Urinary Elimination:  Goal: Ability to achieve a balanced intake and output will improve  Description: Ability to achieve a balanced intake and output will improve  2/4/2022 0959 by Nicole Gallagher RN  Outcome: Ongoing  2/4/2022 0053 by Ceci Dumont RN  Outcome: Ongoing  Goal: Ability to recognize the need to void and respond appropriately will improve  Description: Ability to recognize the need to void and respond appropriately will improve  2/4/2022 0959 by Nicole Gallagher RN  Outcome: Ongoing  2/4/2022 0053 by Ceci Dumont RN  Outcome: Ongoing  Goal: Will remain free from infection  Description: Will remain free from infection  2/4/2022 0959 by Nicole Gallagher RN  Outcome: Ongoing  2/4/2022 0053 by Ceci Dumont RN  Outcome: Ongoing     Problem: Infection - Surgical Site:  Goal: Will show no infection signs and symptoms  Description: Will show no infection signs and symptoms  2/4/2022 0959 by Nicole Gallagher RN  Outcome: Ongoing  2/4/2022 0053 by Ceci Dumont RN  Outcome: Ongoing

## 2022-02-07 ENCOUNTER — CARE COORDINATION (OUTPATIENT)
Dept: CASE MANAGEMENT | Age: 72
End: 2022-02-07

## 2022-02-07 NOTE — CARE COORDINATION
[facility] Transitions Initial Follow Up Call    Call within 2 business days of discharge: Yes    Patient: Rommel Cr Patient : 1950   MRN: <[id number]>  Reason for Admission:  Discharge Date: 22 RARS: Readmission Risk Score: 8 ( )      Last Discharge [facility]       Complaint Diagnosis Description Type Department Provider    2/3/22  Preop testing . .. Admission (Discharged) Verena Reaves MD           Spoke with: Dorian Rosado services provided:  Obtained and reviewed discharge summary and/or continuity of care documents  Education of patient/family/caregiver/guardian to support self-management-      Care Transitions 24 Hour Call    Do you have any ongoing symptoms?: Yes  Patient-reported symptoms: Other (Comment: hematoma)  Do you have a copy of your discharge instructions?: Yes  Do you have all of your prescriptions and are they filled?: Yes  Have you scheduled your follow up appointment?: Yes  How are you going to get to your appointment?: Car - family or friend to transport  Were you discharged with any Home Care or Post Acute Services: No  Post Acute Services: Home Health  Do you feel like you have everything you need to keep you well at home?: Yes  Care Transitions Interventions     States he's doing well. Denies pain. Reports his urine is light yellow. States he did have chills last night but denies fever or chills today. Reports having regular BMs. Pt and his wife asked about a \"big ball\" over his incision. They describe a hematoma that has been there since his procedure. States it's gotten smaller since his procedure. The incision is clean and dry. Denies lightheadedness or dizziness. Advised to call his doctor right away if it appears to worsen or get bigger. Pt and his wife verbalize understanding. He has a follow up appt on Thursday. States he has not needed the pain medication or the laxative. Denies questions or concerns at this time.      Follow Up  Future Appointments   Date Time Provider Uriel Arreguin   2/28/2022  8:30 AM Saúl Mitchell MD [facility]   4/4/2022  8:00 AM [facility], [facility]GUR[facility] - [facility] Roberto Chen

## 2022-02-15 ENCOUNTER — CARE COORDINATION (OUTPATIENT)
Dept: CASE MANAGEMENT | Age: 72
End: 2022-02-15

## 2022-02-15 NOTE — CARE COORDINATION
[facility] Transitions Follow Up Call    2/15/2022    Patient: Ashanti Oconnell  Patient : 1950   MRN: [id number]  Reason for Admission:   Discharge Date: 22 RARS: Readmission Risk Score: 8 ( )         Follow up call attempted, left contact info on vm      Follow Up  Future Appointments   Date Time Provider Uriel Arreguin   2022  8:30 AM Skylar Cam MD [facility]   2022  8:00 AM JEAN Dawkins - PERRY Toth RN

## 2022-02-17 ENCOUNTER — CARE COORDINATION (OUTPATIENT)
Dept: CASE MANAGEMENT | Age: 72
End: 2022-02-17

## 2022-02-17 NOTE — CARE COORDINATION
[facility] Transitions Follow Up Call    2022    Patient: Patrick Casillas  Patient : 1950   MRN: [id number]  Reason for Admission: RALP/BPLND  Discharge Date: 22 RARS: Readmission Risk Score: 8 ( )         Spoke with: Wife. Care Transitions Follow Up Call  Talked to wife d/t pt still sleeping. States doing very well, in fact, hard to keep him down and quiet. Is not doing heavy lifting. Urinating fine, no blood in urine, catheter was removed last week and is scheduled for f/u in 2 weeks. Needs to be reviewed by the provider   Additional needs identified to be addressed with provider: No  none             Method of communication with provider : none      Care Transition Nurse (CTN) contacted the family by telephone to follow up after admission on 2/3/22. Verified name and  with family as identifiers. Addressed changes since last contact: post op  Discussed follow-up appointments. If no appointment was previously scheduled, appointment scheduling offered: No.   Is follow up appointment scheduled within 7 days of discharge? Yes. CTN reviewed discharge instructions, medical action plan and red flags with family and discussed any barriers to care and/or understanding of plan of care after discharge. Discussed appropriate site of care based on symptoms and resources available to patient including: PCP, Specialist and When to call 911. The family agrees to contact the PCP office for questions related to their healthcare. Patients top risk factors for readmission: post op recovery  Interventions to address risk factors: Obtained and reviewed discharge summary and/or continuity of care documents and what to watch for, blood in urine, urine retention      Non-[facility] follow up appointment(s): na    CTN provided contact information for future needs. Plan for follow-up call in 5-7 days based on severity of symptoms and risk factors.   Plan for next call: symptom management-free of post op infection, urinating well  self management-not over doing post op with lifting  follow up appointment-when does he see urology for f/u? Care Transitions Subsequent and Final Call    Schedule Follow Up Appointment with PCP: Completed  Subsequent and Final Calls  Do you have any ongoing symptoms?: No  Have your medications changed?: No  Do you have any questions related to your medications?: No  Do you currently have any active services?: No  Are you currently active with any services?: Home Health  Do you have any needs or concerns that I can assist you with?: No  Identified Barriers: None  Care Transitions Interventions  No Identified Needs  Other Interventions:            Follow Up  Future Appointments   Date Time Provider Uriel Arreguin   2/28/2022  8:30 AM Alexander Dumont MD [facility]   4/4/2022  8:00 AM JAEN Kimble - PERRY Church, RN

## 2022-02-18 ENCOUNTER — TELEPHONE (OUTPATIENT)
Dept: PULMONOLOGY | Age: 72
End: 2022-02-18

## 2022-02-18 NOTE — TELEPHONE ENCOUNTER
Called pt to inform him he does need to continue to wear his O2 at night Mrs Jemma Wadsworth stated that pt is not going to wear the cpap that Dr Topher Larios set him up with. Informed pt that at the very least he should wear his O2 then due to the sleep apnea and his hypoxemia at night.  He stated he would

## 2022-02-24 ENCOUNTER — CARE COORDINATION (OUTPATIENT)
Dept: CASE MANAGEMENT | Age: 72
End: 2022-02-24

## 2022-02-24 NOTE — CARE COORDINATION
[facility] Transitions Follow Up Call    2022    Patient: Diallo Able  Patient : 1950   MRN: [id number]  Reason for Admission: Pre op testing  Discharge Date: 22 RARS: Readmission Risk Score: 8 ( )         Spoke with: Melody Villa Dr Transitions Follow Up Call    Needs to be reviewed by the provider   Additional needs identified to be addressed with provider: No  DME-CPAP, O2             Method of communication with provider : none      Care Transition Nurse (CTN) contacted the patient by telephone to follow up after admission on 22. Verified name and  with patient as identifiers. Addressed changes since last contact: none  Discussed follow-up appointments. If no appointment was previously scheduled, appointment scheduling offered: Yes. Is follow up appointment scheduled within 7 days of discharge? Yes. Advance Care Planning:   Does patient have an Advance Directive: Not on file    CTN reviewed discharge instructions, medical action plan and red flags with patient and discussed any barriers to care and/or understanding of plan of care after discharge. Discussed appropriate site of care based on symptoms and resources available to patient including: PCP, Specialist, Urgent care clinics, When to call 911 and [address]. The patient agrees to contact the PCP office for questions related to their healthcare. Patients top risk factors for readmission: ineffective coping  Interventions to address risk factors: Obtained and reviewed discharge summary and/or continuity of care documents      Non-[facility] follow up appointment(s):     CTN provided contact information for future needs. Plan for follow-up call in 5-7 days based on severity of symptoms and risk factors. Plan for next call: self management-Wearing CPAP? This [facility] spoke with pt and pt stated that he is doing well. Patient denied any worsening symptoms.  Denied fever, chills, N/V and any difficulty breathing at this time. Denied chest pain and SOB. Denied difficulty with urination, BMs or appetite. Denied dysuria/hematuria. Pt encouraged to wear CPAP. No new changes or medications to report or address. Denied any needs and concern at this time. Advised pt to immediately report any worsening symptoms to the PCP. Patient verbalized understanding and agreed. Kaleb Payne LPN, [facility]  PH: [phone]              Care Transitions Subsequent and Final Call    Schedule Follow Up Appointment with PCP: Completed  Subsequent and Final Calls  Do you have any ongoing symptoms?: No  Have your medications changed?: No  Do you have any questions related to your medications?: No  Do you currently have any active services?: No  Are you currently active with any services?: Home Health  Do you have any needs or concerns that I can assist you with?: No  Identified Barriers: None  Care Transitions Interventions  No Identified Needs  Other Interventions:            Follow Up  Future Appointments   Date Time Provider Uriel Arreguin   3/25/2022  2:30 PM Lena Cunningham MD [facility]   4/4/2022  8:00 AM JEAN Bangura - PERRY Marie LPN

## 2022-03-04 ENCOUNTER — CARE COORDINATION (OUTPATIENT)
Dept: CASE MANAGEMENT | Age: 72
End: 2022-03-04

## 2022-03-04 NOTE — CARE COORDINATION
[facility] Transitions Follow Up Call    3/4/2022    Patient: Brenda Vicente  Patient : 1950   MRN: [id number]  Reason for Admission: Pre op testing  Discharge Date: 22 RARS: Readmission Risk Score: 8 ( )         Spoke with: Melody Villa Dr Transitions Follow Up Call    Needs to be reviewed by the provider   Additional needs identified to be addressed with provider: No  none             Method of communication with provider : none      Care Transition Nurse (CTN) contacted the patient by telephone to follow up after admission on 22. Verified name and  with patient as identifiers. Addressed changes since last contact: none  Discussed follow-up appointments. If no appointment was previously scheduled, appointment scheduling offered: Yes. Is follow up appointment scheduled within 7 days of discharge? Yes. Advance Care Planning:   Does patient have an Advance Directive: Not on file. CTN reviewed discharge instructions, medical action plan and red flags with patient and discussed any barriers to care and/or understanding of plan of care after discharge. Discussed appropriate site of care based on symptoms and resources available to patient including: PCP, Specialist, Urgent care clinics, When to call 911 and [address]. The patient agrees to contact the PCP office for questions related to their healthcare. Patients top risk factors for readmission: ineffective coping  Interventions to address risk factors: Obtained and reviewed discharge summary and/or continuity of care documents      Non-[facility] follow up appointment(s):     CTN provided contact information for future needs. No further follow-up call indicated based on severity of symptoms and risk factors. Plan for next call: N/A     This [facility] spoke with pt and pt stated that he is doing well. Patient denied any worsening symptoms. Denied fever, chills, N/V and any difficulty breathing at this time.   Denied chest pain and SOB. Denied difficulty with urination, BMs or appetite. No new changes or medications to report or address. Pt is stable and doing well, denied needs and concerns. Advised pt to immediately report any worsening symptoms to the PCP. Patient verbalized understanding and agreed. Final call as pt is stable and CT period is ending. Padmini Cantu LPN, [facility]  PH: [phone]                Care Transitions Subsequent and Final Call    Schedule Follow Up Appointment with PCP: Completed  Subsequent and Final Calls  Do you have any ongoing symptoms?: No  Have your medications changed?: No  Do you have any questions related to your medications?: No  Do you currently have any active services?: No  Are you currently active with any services?: Home Health  Do you have any needs or concerns that I can assist you with?: No  Identified Barriers: None  Care Transitions Interventions  No Identified Needs  Other Interventions:            Follow Up  Future Appointments   Date Time Provider Uriel Arreguin   3/25/2022  2:30 PM Cyndi Pisano MD [facility]   4/4/2022  8:00 AM JEAN Patterson - P[facility]Y Wilson LPN

## 2022-03-25 ENCOUNTER — OFFICE VISIT (OUTPATIENT)
Dept: PULMONOLOGY | Age: 72
End: 2022-03-25
Payer: MEDICARE

## 2022-03-25 VITALS
BODY MASS INDEX: 33.43 KG/M2 | HEIGHT: 67 IN | OXYGEN SATURATION: 96 % | HEART RATE: 106 BPM | WEIGHT: 213 LBS | DIASTOLIC BLOOD PRESSURE: 80 MMHG | SYSTOLIC BLOOD PRESSURE: 138 MMHG

## 2022-03-25 DIAGNOSIS — J96.11 CHRONIC HYPOXEMIC RESPIRATORY FAILURE (HCC): ICD-10-CM

## 2022-03-25 DIAGNOSIS — G47.33 OSA (OBSTRUCTIVE SLEEP APNEA): Primary | ICD-10-CM

## 2022-03-25 DIAGNOSIS — I10 ESSENTIAL HYPERTENSION: ICD-10-CM

## 2022-03-25 DIAGNOSIS — Z86.16 HISTORY OF COVID-19: ICD-10-CM

## 2022-03-25 PROCEDURE — 99214 OFFICE O/P EST MOD 30 MIN: CPT | Performed by: INTERNAL MEDICINE

## 2022-03-25 NOTE — PROGRESS NOTES
PULMONARY OFFICE FOLLOW-UP VISIT    CONSULTING PHYSICIAN:  JEAN Adkins NP     REASON FOR VISIT:   Chief Complaint   Patient presents with    Sleep Apnea       DATE OF VISIT: 3/25/2022    HISTORY OF PRESENT ILLNESS: 66y.o. year old male comes into the pulmonary office for a follow-up. Since last clinic visit patient underwent a split-night sleep study which showed patient has severe obstructive sleep apnea. He was started on auto titrating CPAP therapy. Patient reports that he is unable to use the machine at nighttime as he continues to have prostate problems. This leads to frequent urination and he has to take the mask off every time he has to go to the bathroom. He is having many voiding accidents at nighttime which is preventing him in using the CPAP therapy. He wants to improve his prostate function before he starts the CPAP therapy again. His breathing has been stable. Previously:   Patient suffered with COVID-19 infection in September of this year. Had a prolonged course in the hospital.  During the hospitalization was seen to have snoring, hypoxia and was placed on CPAP therapy. He was able to tolerate the CPAP therapy and felt better with it. Patient's wife reports that for the last several years he has been having snoring, gasping, choking at nighttime with poor quality sleep. He is very restless during the sleep and frequently wakes up. Sleep is unrefreshing and feels tired during the daytime. Currently using oxygen with ambulation and at nighttime. Weight is stable.     Sleep Medicine 10/7/2021   Sitting and reading 1   Watching TV 3   Sitting, inactive in a public place (e.g. a theatre or a meeting) 0   As a passenger in a car for an hour without a break 0   Lying down to rest in the afternoon when circumstances permit 3   Sitting and talking to someone 0   Sitting quietly after a lunch without alcohol 1   In a car, while stopped for a few minutes in traffic 0   Total score 8       STOP-BANG Questionnaire    Snore Loudly Yes   Often feel Tired/Fatigued/Sleepy Yes   Observed breathing pauses Yes   Blood pressure problems No   BMI >35 Kg/m2 Body mass index is 33.36 kg/m². Age>50 66 y.o. Neck Circumference>16 inches      Gender Male? male     Total 5       REVIEW OF SYSTEMS:   8 point review of system is negative except that mentioned in the subjective portion. PAST MEDICAL HISTORY:   Past Medical History:   Diagnosis Date    Essential hypertension 8/20/2021    Pneumonia 8/19/2021    Prostate cancer ([address] 2/3/2022    Type 2 diabetes mellitus 1/11/2022       PAST SURGICAL HISTORY:   Past Surgical History:   Procedure Laterality Date    APPENDECTOMY      PROSTATECTOMY N/A 2/3/2022    ROBOTIC LAPAROSCOPIC RADICAL PROSTATECTOMY WITH BILATERAL LYMPH NODES DISSECTION WITH BILATERAL NERVE SPARING performed by Betsy Jean MD at [place] [address] HISTORY:   Social History     Tobacco Use    Smoking status: Never Smoker    Smokeless tobacco: Never Used   Vaping Use    Vaping Use: Never used   Substance Use Topics    Alcohol use: Yes     Comment: occas    Drug use: No       FAMILY HISTORY:   Family History   Problem Relation Age of Onset    Diabetes Father     Stroke Father        MEDICATIONS:     No current outpatient medications on file prior to visit. No current facility-administered medications on file prior to visit. ALLERGIES:   Allergies as of 03/25/2022    (No Known Allergies)      OBJECTIVE:   height is 5' 7\" (1.702 m) and weight is 213 lb (96.6 kg). His blood pressure is 138/80 and his pulse is 106. His oxygen saturation is 96%. PHYSICAL EXAM:    CONSTITUTIONAL: He is a 66y.o.-year-old who appears his stated age. He is alert and oriented x 3 and in no acute distress. HEENT: PERRLA, EOMI. No scleral icterus. No thrush, atraumatic, normocephalic. Mallampati class 2 airway. NECK: Supple, without cervical or supraclavicular lymphadenopathy:  CARDIOVASCULAR: S1 S2 RRR. Without murmer  RESPIRATORY & CHEST: Lungs are clear to auscultation and percussion. No wheezing, no crackles. Good air movement  GASTROINTESTINAL & ABDOMEN: Soft, nontender, positive bowel sounds in all quadrants, non-distended, without hepatosplenomegaly. GENITOURINARY: Deferred. MUSCULOSKELETAL: No tenderness to palpation of the axial skeleton. There is no clubbing. No cyanosis. No edema of the lower extremities. SKIN OF BODY: No rash or jaundice. PSYCHIATRIC EVALUATION: Normal affect. Patient answers questions appropriately. HEMATOLOGIC/LYMPHATIC/ IMMUNOLOGIC: No palpable lymphadenopathy. NEUROLOGIC: Alert and oriented x 3. Groslly non-focal. Motor strength is 5+/5 in all muscle groups. The patient has a normal sensorium globally. LABS:    Lab Summary Latest Ref Rng & Units 2/4/2022 2/1/2022 12/15/2021   WBC 4.0 - 11.0 K/uL - 5.7 -   Hgb 13.5 - 17.5 g/dL 13.7 15.9 -   Hct 40.5 - 52.5 % 41.1 48.3 -   Platelets 731 - 858 K/uL - 253 -   Sodium 136 - 145 mmol/L 139 140 -   Potassium 3.5 - 5.1 mmol/L 4.4 4.9 -   BUN 7 - 20 mg/dL 18 22(H) 18   Creatinine 0.8 - 1.3 mg/dL 1.2 1.2 1.0   Glucose 70 - 99 mg/dL 126(H) 203(H) -   Calcium 8.3 - 10.6 mg/dL 8.9 10.1 -   Alk Phos 40 - 129 U/L - 81 -   Albumin 3.4 - 5.0 g/dL - 4.1 -   Bilirubin 0.0 - 1.0 mg/dL - 0.3 -   AST 15 - 37 U/L - 26 -   ALT 10 - 40 U/L - 43(H) -   Some recent data might be hidden       All labs were personally reviewed by me any my interpretation is: CBC is normal. Chem 8 is dyslipidemia.      IMAGING:    Narrative   EXAMINATION:   TWO XRAY VIEWS OF THE CHEST       9/14/2021 11:04 am       FINDINGS:   HEART/MEDIASTINUM: The cardiomediastinal silhouette is within normal limits.       PLEURA/LUNGS: There is patchy hazy opacities in the periphery of the lungs   bilaterally consistent with multifocal airspace disease, which has improved   from the prior exam.  There are no pleural effusions. There is no appreciable   pneumothorax.       BONES/SOFT TISSUE: No acute abnormality.           Impression   Persistent multifocal airspace disease, which has improved from the prior   exam.  Continued follow-up to resolution is recommended.           Split-night sleep study done on 11/11/2021  Overall AHI 63.8  REM AHI: 78.4  Lowest oxygen saturation: 73%    Treatment portion  Adequate therapy: Auto CPAP 12-18 cmH2O with medium AirFit F 20 fullface mask    IMPRESSION AND RECOMMENDATIONS:     1. LEILANI (obstructive sleep apnea)  -Patient has severe obstructive sleep apnea which is amenable to auto titrating CPAP. -Patient is unwilling to use the CPAP therapy due to prostate issues.  -I tried to convince him that using the CPAP therapy will improve his breathing at nighttime and will help him reduce any chronic issues.  -Patient currently wants to return the machine and will consider using it again in the future. 2. Pneumonia due to COVID-19 virus  -Slowly improving. 3. Chronic hypoxemic respiratory failure ([address]  -Patient has stopped using oxygen at nighttime. He wants to return the oxygen therapy back to his [employer]. 4. Class 1 obesity due to excess calories with serious comorbidity and body mass index (BMI) of 32.0 to 32.9 in adult  -I strongly advised the patient to make efforts to lose weight. I discussed various modalities including moderate intensity intermittent exercises, diet control and bariatric surgery. If the patient loses even 10 to 15% of current body weight, it will be beneficial in improving the overall health. Return if symptoms worsen or fail to improve. Saúl Mitchell MD  Pulmonary Critical Care and Sleep Medicine  3/25/2022, 2:59 PM    This note was completed using dragon medical speech recognition software.  Grammatical errors, random word insertions, pronoun errors and incomplete sentences are occasional consequences of this technology due to software limitations. If there are questions or concerns about the content of this note of information contained within the body of this dictation they should be addressed with the provider for clarification.

## 2022-03-28 SDOH — HEALTH STABILITY: PHYSICAL HEALTH: ON AVERAGE, HOW MANY DAYS PER WEEK DO YOU ENGAGE IN MODERATE TO STRENUOUS EXERCISE (LIKE A BRISK WALK)?: 1 DAY

## 2022-03-28 ASSESSMENT — LIFESTYLE VARIABLES
HOW OFTEN DO YOU HAVE SIX OR MORE DRINKS ON ONE OCCASION: 1
HOW MANY STANDARD DRINKS CONTAINING ALCOHOL DO YOU HAVE ON A TYPICAL DAY: 1 OR 2
HOW OFTEN DO YOU HAVE A DRINK CONTAINING ALCOHOL: MONTHLY OR LESS
HOW MANY STANDARD DRINKS CONTAINING ALCOHOL DO YOU HAVE ON A TYPICAL DAY: 1
HOW OFTEN DO YOU HAVE A DRINK CONTAINING ALCOHOL: 2

## 2022-03-28 ASSESSMENT — PATIENT HEALTH QUESTIONNAIRE - PHQ9
SUM OF ALL RESPONSES TO PHQ QUESTIONS 1-9: 0
1. LITTLE INTEREST OR PLEASURE IN DOING THINGS: 0
SUM OF ALL RESPONSES TO PHQ QUESTIONS 1-9: 0
SUM OF ALL RESPONSES TO PHQ9 QUESTIONS 1 & 2: 0
2. FEELING DOWN, DEPRESSED OR HOPELESS: 0
SUM OF ALL RESPONSES TO PHQ QUESTIONS 1-9: 0
SUM OF ALL RESPONSES TO PHQ QUESTIONS 1-9: 0

## 2022-04-04 ENCOUNTER — OFFICE VISIT (OUTPATIENT)
Dept: FAMILY MEDICINE CLINIC | Age: 72
End: 2022-04-04
Payer: MEDICARE

## 2022-04-04 VITALS
SYSTOLIC BLOOD PRESSURE: 130 MMHG | HEIGHT: 67 IN | WEIGHT: 212.5 LBS | BODY MASS INDEX: 33.35 KG/M2 | TEMPERATURE: 97 F | DIASTOLIC BLOOD PRESSURE: 70 MMHG | OXYGEN SATURATION: 98 % | HEART RATE: 60 BPM

## 2022-04-04 DIAGNOSIS — E11.9 TYPE 2 DIABETES MELLITUS WITHOUT COMPLICATION, WITHOUT LONG-TERM CURRENT USE OF INSULIN (HCC): ICD-10-CM

## 2022-04-04 DIAGNOSIS — Z00.00 ENCOUNTER FOR ANNUAL WELLNESS VISIT (AWV) IN MEDICARE PATIENT: Primary | ICD-10-CM

## 2022-04-04 DIAGNOSIS — J84.10 PULMONARY FIBROSIS (HCC): ICD-10-CM

## 2022-04-04 PROBLEM — D68.59 OTHER PRIMARY THROMBOPHILIA (HCC): Status: RESOLVED | Noted: 2022-04-04 | Resolved: 2022-04-04

## 2022-04-04 PROBLEM — D68.59 OTHER PRIMARY THROMBOPHILIA (HCC): Status: ACTIVE | Noted: 2022-04-04

## 2022-04-04 PROCEDURE — G0439 PPPS, SUBSEQ VISIT: HCPCS | Performed by: NURSE PRACTITIONER

## 2022-04-04 NOTE — PROGRESS NOTES
[facility] VISIT    Patient is here for their Medicare Annual Wellness Visit. PT with underlying type 2 DM, well controlled, last hgbA1c- 6.9, not taking medications. Pt with pulmonary fibrosis and recently tested positive for sleep apnea, has not started CPAP. Pt had prostatectomy 02/2022, undergoing PT for bladder control. Denies known history of thrombophilia and reports not taking anti-coagulant. Last eye exam: 7/2022  Last dental exam: 10/2022  Exercise: does yard work daily   Diet: well balanced    How would you rate your overall health? : Good        Fall Risk 3/28/2022 9/23/2021   2 or more falls in past year? no no   Fall with injury in past year? no no       PHQ Scores 3/28/2022 9/23/2021   PHQ2 Score 0 0   PHQ9 Score 0 0       Do you always wear a seat belt in the car?: Yes and No      Have you noted any problems with hearing?: Yes  Have you noted any vision problems?: No  Do you have concerns about your sexual health?: yes -   In the past month how much has pain been an issue for you?:  Not at all  In the past month have you had issues with anxiety, loneliness, irritability or fatigue:  Not at all    Do you take opioid medications even sometimes? No (if using assess risk and whether other treatments would be beneficial)    Living Will: Yes,   Copy requested      Healthcare Decision Maker:  No healthcare decision makers have been documented. Click here to complete [address] including selection of the Healthcare Decision Maker Relationship (ie \"Primary\")       Who lives at home with you: Wife, teenage grandson   Do you have any services coming to your home (meals on wheels, home health, etc) ? : no      Do you need help with:  Using the phone:  No  Bathing: No  Dressing:  No  Toileting: No  Transportation:  No  Shopping: No  Preparing meals: No  Housework/Laundry: No  Medications: No  Money management: No    Does your home have:  Unsecured throw rugs: Yes  Grab bars in bathroom: No  Walk in shower: Yes  Seat in shower: No  Lit pathways for night (nightlights): Yes    Memory:  Have you or anyone close to you expressed concerns about your memory: No    Knows:  Month: Yes  Day: Yes  Year: Yes  Day of Week: Yes  Able to Recall (tree, fork, ball) : Yes    Patient history:   Patient's medications, allergies, past medical, surgical, social and family histories were reviewed and updated in the EHR under History. Social History     Substance and Sexual Activity   Alcohol Use Yes    Comment: occas       Social History     Substance and Sexual Activity   Drug Use No       Social History     Tobacco Use   Smoking Status Never Smoker   Smokeless Tobacco Never Used           Care Team:  Patient's list of care team members was updated in EHR under the [employer]. Dr. Carly Beyer- urology surgical  Dr. Carl Brown- urology      Immunizations: Reviewed with patient. Due for covid, shingrix, TDAP and [id number]    Health Maintenance Due   Topic Date Due    COVID-19 Vaccine (1) Never done    Diabetic retinal exam  Never done    DTaP/Tdap/Td vaccine (1 - Tdap) Never done    Shingles Vaccine (1 of 2) Never done    Pneumococcal 65+ years Vaccine (1 of 1 - PPSV23) Never done   [employer] Visit (AWV)  Never done       CHRONIC CONDITIONS / ACUTE COMPLAINTS   Essential hypertension  Type 2 diabetes mellitus  Prostate cancer ([facility]  Pneumonia due to COVID-19 virus  Chronic hypoxemic respiratory failure (HCC)  Pulmonary fibrosis (HCC)  LEILANI (obstructive sleep apnea        Physical Exam:    Body mass index is 33.28 kg/m².   Vitals:    04/04/22 0810   BP: 130/70   Site: Right Upper Arm   Position: Sitting   Cuff Size: Large Adult   Pulse: 60   Temp: 97 °F (36.1 °C)   TempSrc: Infrared   SpO2: 98%   Weight: 212 lb 8 oz (96.4 kg)   Height: 5' 7\" (1.702 m)     Wt Readings from Last 3 Encounters:   04/04/22 212 lb 8 oz (96.4 kg)   03/25/22 213 lb (96.6 kg)   02/04/22 215 lb (97.5 kg) GENERAL:Alert and oriented x 4 NAD, no acute distress, well hydrated, well developed. LUNG:clear to auscultation bilaterally with normal respiratory effort  CV: Normal heart sounds, regular rate and rhythm without murmurs  EXTREMETY: no loss of hair, no edema, normal pedal pulses bilaterally    Visual inspection:  Deformity/amputation: absent  Skin lesions/pre-ulcerative calluses: absent  Edema: right- negative, left- negative    Sensory exam:  Monofilament sensation: normal  (minimum of 5 random plantar locations tested, avoiding callused areas - > 1 area with absence of sensation is + for neuropathy)    Plus at least one of the following:  Pulses: normal,   Pinprick: Intact  Proprioception: Intact  Vibration (128 Hz): NA     Was the timed get up and go unsteady or longer than 20 seconds: No    Vision Screen for Initial Exam: no    EKG for Initial Exam at 71 (): no    AAA U/S screen for men 65-75 who smoked (): no    Assessment/Plan:    Abby Anderson was seen today for medicare awv. Diagnoses and all orders for this visit:    Encounter for annual wellness visit (AWV) in Medicare patient  Advised covid, shingrix, TDAP, pneumo vaccination- refused at this time  Routine dental and vision  Requested diabetic retinal exam  Advised copy of living will  Colonoscopy due 2024    Pulmonary fibrosis ([facility]  Controlled  Follow up with Dr. Jl Suero    Type 2 diabetes mellitus without complication, without long-term current use of insulin ([facility]  -     Lipid, Fasting; Future  -     Hemoglobin A1C; Future  -      DIABETES FOOT EXAM  Controlled  Advised daily monitoring  Low CHO diet and exercise          Return in about 6 months (around 10/4/2022) for hypertension re-check, lab review, diabetes re-check.

## 2022-04-05 ENCOUNTER — TELEPHONE (OUTPATIENT)
Dept: FAMILY MEDICINE CLINIC | Age: 72
End: 2022-04-05

## 2022-06-08 DIAGNOSIS — E78.2 MIXED HYPERLIPIDEMIA: ICD-10-CM

## 2022-06-08 RX ORDER — ATORVASTATIN CALCIUM 10 MG/1
TABLET, FILM COATED ORAL
Qty: 90 TABLET | Refills: 3 | OUTPATIENT
Start: 2022-06-08

## 2022-08-10 ENCOUNTER — TELEPHONE (OUTPATIENT)
Dept: FAMILY MEDICINE CLINIC | Age: 72
End: 2022-08-10

## 2023-01-26 ENCOUNTER — TELEPHONE (OUTPATIENT)
Dept: PULMONOLOGY | Age: 73
End: 2023-01-26

## 2023-01-26 NOTE — TELEPHONE ENCOUNTER
Received message from Sleep lab stating pt left a message on their VM.     I LM on Vm for pt to call office

## 2023-01-27 ENCOUNTER — TELEPHONE (OUTPATIENT)
Dept: PULMONOLOGY | Age: 73
End: 2023-01-27

## 2023-01-27 NOTE — TELEPHONE ENCOUNTER
Spoke with pt's wife. She is requesting a note stating the pt's deviated septum could cause his sleep apnea. When questioning if pt has been evaluated by ENT for suspected deviated septum, pt's wife states he has not.   Provided her with [facility] ENT phone number

## 2023-01-27 NOTE — TELEPHONE ENCOUNTER
Pt wife called and asked to speak to [place]. Said they need a letter for the [facility].     Ph # [phone]

## 2023-04-06 ENCOUNTER — HOSPITAL ENCOUNTER (OUTPATIENT)
Dept: GENERAL RADIOLOGY | Age: 73
Discharge: HOME | End: 2023-04-06
Payer: COMMERCIAL

## 2023-04-06 ENCOUNTER — HOSPITAL ENCOUNTER (OUTPATIENT)
Age: 73
Discharge: HOME | End: 2023-04-06
Payer: COMMERCIAL

## 2023-04-06 DIAGNOSIS — Z00.00 ROUTINE GENERAL MEDICAL EXAMINATION AT A HEALTH CARE FACILITY: ICD-10-CM

## 2023-04-06 PROCEDURE — 70160 X-RAY EXAM OF NASAL BONES: CPT

## 2023-05-02 ENCOUNTER — HOSPITAL ENCOUNTER (OUTPATIENT)
Dept: GENERAL RADIOLOGY | Age: 73
Discharge: HOME | End: 2023-05-02
Payer: MEDICARE

## 2023-05-02 DIAGNOSIS — M81.8 OTHER OSTEOPOROSIS WITHOUT CURRENT PATHOLOGICAL FRACTURE: ICD-10-CM

## 2023-05-02 DIAGNOSIS — C61 PROSTATE CANCER (HCC): ICD-10-CM

## 2023-05-02 PROCEDURE — 77080 DXA BONE DENSITY AXIAL: CPT

## 2023-05-24 ENCOUNTER — OFFICE VISIT (OUTPATIENT)
Dept: FAMILY MEDICINE CLINIC | Age: 73
End: 2023-05-24
Payer: MEDICARE

## 2023-05-24 VITALS
SYSTOLIC BLOOD PRESSURE: 128 MMHG | OXYGEN SATURATION: 97 % | HEIGHT: 67 IN | HEART RATE: 62 BPM | BODY MASS INDEX: 36.88 KG/M2 | TEMPERATURE: 97 F | DIASTOLIC BLOOD PRESSURE: 72 MMHG | WEIGHT: 235 LBS

## 2023-05-24 DIAGNOSIS — Z00.00 ENCOUNTER FOR ANNUAL WELLNESS VISIT (AWV) IN MEDICARE PATIENT: Primary | ICD-10-CM

## 2023-05-24 DIAGNOSIS — E11.9 TYPE 2 DIABETES MELLITUS WITHOUT COMPLICATION, WITHOUT LONG-TERM CURRENT USE OF INSULIN (HCC): ICD-10-CM

## 2023-05-24 DIAGNOSIS — E78.2 MIXED HYPERLIPIDEMIA: ICD-10-CM

## 2023-05-24 DIAGNOSIS — C61 PROSTATE CANCER (HCC): ICD-10-CM

## 2023-05-24 DIAGNOSIS — I10 ESSENTIAL HYPERTENSION: ICD-10-CM

## 2023-05-24 LAB — HBA1C MFR BLD: 6.1 %

## 2023-05-24 PROCEDURE — 83036 HEMOGLOBIN GLYCOSYLATED A1C: CPT | Performed by: NURSE PRACTITIONER

## 2023-05-24 PROCEDURE — 3017F COLORECTAL CA SCREEN DOC REV: CPT | Performed by: NURSE PRACTITIONER

## 2023-05-24 PROCEDURE — 3044F HG A1C LEVEL LT 7.0%: CPT | Performed by: NURSE PRACTITIONER

## 2023-05-24 PROCEDURE — G0439 PPPS, SUBSEQ VISIT: HCPCS | Performed by: NURSE PRACTITIONER

## 2023-05-24 PROCEDURE — 3074F SYST BP LT 130 MM HG: CPT | Performed by: NURSE PRACTITIONER

## 2023-05-24 PROCEDURE — 1123F ACP DISCUSS/DSCN MKR DOCD: CPT | Performed by: NURSE PRACTITIONER

## 2023-05-24 PROCEDURE — 3078F DIAST BP <80 MM HG: CPT | Performed by: NURSE PRACTITIONER

## 2023-05-24 RX ORDER — TADALAFIL 20 MG/1
TABLET ORAL
COMMUNITY
Start: 2023-04-14

## 2023-05-24 SDOH — ECONOMIC STABILITY: INCOME INSECURITY: HOW HARD IS IT FOR YOU TO PAY FOR THE VERY BASICS LIKE FOOD, HOUSING, MEDICAL CARE, AND HEATING?: NOT HARD AT ALL

## 2023-05-24 SDOH — ECONOMIC STABILITY: HOUSING INSECURITY
IN THE LAST 12 MONTHS, WAS THERE A TIME WHEN YOU DID NOT HAVE A STEADY PLACE TO SLEEP OR SLEPT IN A SHELTER (INCLUDING NOW)?: NO

## 2023-05-24 SDOH — ECONOMIC STABILITY: FOOD INSECURITY: WITHIN THE PAST 12 MONTHS, THE FOOD YOU BOUGHT JUST DIDN'T LAST AND YOU DIDN'T HAVE MONEY TO GET MORE.: NEVER TRUE

## 2023-05-24 SDOH — ECONOMIC STABILITY: FOOD INSECURITY: WITHIN THE PAST 12 MONTHS, YOU WORRIED THAT YOUR FOOD WOULD RUN OUT BEFORE YOU GOT MONEY TO BUY MORE.: NEVER TRUE

## 2023-05-24 ASSESSMENT — PATIENT HEALTH QUESTIONNAIRE - PHQ9
7. TROUBLE CONCENTRATING ON THINGS, SUCH AS READING THE NEWSPAPER OR WATCHING TELEVISION: 0
1. LITTLE INTEREST OR PLEASURE IN DOING THINGS: 0
8. MOVING OR SPEAKING SO SLOWLY THAT OTHER PEOPLE COULD HAVE NOTICED. OR THE OPPOSITE, BEING SO FIGETY OR RESTLESS THAT YOU HAVE BEEN MOVING AROUND A LOT MORE THAN USUAL: 0
4. FEELING TIRED OR HAVING LITTLE ENERGY: 0
SUM OF ALL RESPONSES TO PHQ QUESTIONS 1-9: 0
SUM OF ALL RESPONSES TO PHQ QUESTIONS 1-9: 0
9. THOUGHTS THAT YOU WOULD BE BETTER OFF DEAD, OR OF HURTING YOURSELF: 0
3. TROUBLE FALLING OR STAYING ASLEEP: 0
2. FEELING DOWN, DEPRESSED OR HOPELESS: 0
SUM OF ALL RESPONSES TO PHQ QUESTIONS 1-9: 0
6. FEELING BAD ABOUT YOURSELF - OR THAT YOU ARE A FAILURE OR HAVE LET YOURSELF OR YOUR FAMILY DOWN: 0
5. POOR APPETITE OR OVEREATING: 0
SUM OF ALL RESPONSES TO PHQ9 QUESTIONS 1 & 2: 0
SUM OF ALL RESPONSES TO PHQ QUESTIONS 1-9: 0
10. IF YOU CHECKED OFF ANY PROBLEMS, HOW DIFFICULT HAVE THESE PROBLEMS MADE IT FOR YOU TO DO YOUR WORK, TAKE CARE OF THINGS AT HOME, OR GET ALONG WITH OTHER PEOPLE: 0

## 2023-05-24 NOTE — PROGRESS NOTES
[facility] VISIT    Patient is here for their Medicare Annual Wellness Visit . Pt is providing disability paperwork to be completed following prostate Ca with prostatectomy. Pt reports following radiation and surgery is incontinent throughout day and night. Reports going to restroom 12+ times day and night. Pt is now having to wear incontinent pads. Pt is having difficulty with jobs unless easy access to restroom, high risk for urinary accidents and eventual skin breakdown. [facility], requested medical history to be faxed. Last eye exam: 4/2022  Last dental exam: 5/2023  Exercise: yard work everyday  Diet: well balanced    How would you rate your overall health? : Good        Fall Risk 5/24/2023 3/28/2022 9/23/2021   2 or more falls in past year? no no no   Fall with injury in past year? no no no       PHQ Scores 5/24/2023 3/28/2022 9/23/2021   PHQ2 Score 0 0 0   PHQ9 Score 0 0 0         Do you always wear a seat belt in the car?: Yes      Have you noted any problems with hearing?: No  Have you noted any vision problems?: No  Do you have concerns about your sexual health?: no  In the past month how much has pain been an issue for you?:  Not at all  In the past month have you had issues with anxiety, loneliness, irritability or fatigue:  A little bit    Do you take opioid medications even sometimes? No (if using assess risk and whether other treatments would be beneficial)    Living Will: Yes,   Copy requested      Healthcare Decision Maker:    Primary Decision MakerSwali Mishra  [phone]    Secondary Decision Maker: Hui everett - Child - [phone]  Click here to complete Healthcare Decision Makers including selection of the Healthcare Decision Maker Relationship (ie \"Primary\"). Today we documented Decision Maker(s) consistent with Legal Next of Kin hierarchy.       Who lives at home with you: wife, grandson  Do you have

## 2023-05-31 ENCOUNTER — TELEPHONE (OUTPATIENT)
Dept: FAMILY MEDICINE CLINIC | Age: 73
End: 2023-05-31

## 2023-05-31 NOTE — TELEPHONE ENCOUNTER
Called Pt wife, no answer, LVM that Pt will need to reach out to Urology d/t Pt having prostate removed. Called Pt mobile, no answer, LVM with same info.

## 2023-05-31 NOTE — TELEPHONE ENCOUNTER
Pt's wife requesting medication for bladder that was discussed in last visit be sent to [facility] [id number] Eddie Guerrero, [address] [phone] Adwoa Levy [phone]

## 2023-09-27 ENCOUNTER — OFFICE VISIT (OUTPATIENT)
Dept: FAMILY MEDICINE CLINIC | Age: 73
End: 2023-09-27
Payer: MEDICARE

## 2023-09-27 VITALS
TEMPERATURE: 97.2 F | BODY MASS INDEX: 36.9 KG/M2 | WEIGHT: 235.6 LBS | OXYGEN SATURATION: 96 % | HEART RATE: 63 BPM | DIASTOLIC BLOOD PRESSURE: 88 MMHG | SYSTOLIC BLOOD PRESSURE: 142 MMHG

## 2023-09-27 DIAGNOSIS — L25.5 DERMATITIS DUE TO PLANTS, INCLUDING POISON IVY, SUMAC, AND OAK: Primary | ICD-10-CM

## 2023-09-27 PROCEDURE — 1123F ACP DISCUSS/DSCN MKR DOCD: CPT | Performed by: NURSE PRACTITIONER

## 2023-09-27 PROCEDURE — 3078F DIAST BP <80 MM HG: CPT | Performed by: NURSE PRACTITIONER

## 2023-09-27 PROCEDURE — 1036F TOBACCO NON-USER: CPT | Performed by: NURSE PRACTITIONER

## 2023-09-27 PROCEDURE — G8427 DOCREV CUR MEDS BY ELIG CLIN: HCPCS | Performed by: NURSE PRACTITIONER

## 2023-09-27 PROCEDURE — 3074F SYST BP LT 130 MM HG: CPT | Performed by: NURSE PRACTITIONER

## 2023-09-27 PROCEDURE — 99213 OFFICE O/P EST LOW 20 MIN: CPT | Performed by: NURSE PRACTITIONER

## 2023-09-27 PROCEDURE — G8417 CALC BMI ABV UP PARAM F/U: HCPCS | Performed by: NURSE PRACTITIONER

## 2023-09-27 PROCEDURE — 3017F COLORECTAL CA SCREEN DOC REV: CPT | Performed by: NURSE PRACTITIONER

## 2023-09-27 RX ORDER — TRIAMCINOLONE ACETONIDE 1 MG/G
CREAM TOPICAL
Qty: 45 G | Refills: 0 | Status: SHIPPED | OUTPATIENT
Start: 2023-09-27

## 2023-09-27 RX ORDER — PREDNISONE 20 MG/1
TABLET ORAL
Qty: 18 TABLET | Refills: 0 | Status: SHIPPED | OUTPATIENT
Start: 2023-09-27 | End: 2023-10-10

## 2023-09-27 NOTE — CARE COORDINATION
Cm spoke to [place] with [facility] [phone] and she is aware of patient discharging home today. CM called pt's spouse and CM recommended coming at 2pm to  patient. CM informed her of home care agency and home oxygen evaluation pending. Wife states she talked to insurance company and that there insurance is with [employer] also and oxygen would be covered if provided through [facility]. CM told her I would contact [facility] and if needed they would have to deliver tank to hospital. She verbalized understanding. CM called [facility] oxygen [phone] and choose option 1 for hospital provider. CM was on hold 10 minutes with no answer. CM faxed to [facility] [phone] DME oxygen order, demographics, pulmonology note and wrote on fax paper oxygen eval pending and pt discharging home today. CM called in house [facility] rep Stacey Alonso and was given [employer] number [phone] and on hold for another 8 minutes with no one answering. CM also faxed the above information to fax number provided by [employer] for [facility] at [phone]. RAI spoke to Viddyad with [facility] and will try to assist CM with contacting someone at [facility].     Paco Bullock RN, BSN  [phone] L/m to reschedule an appointment. gaby

## 2023-10-09 ENCOUNTER — TELEPHONE (OUTPATIENT)
Dept: FAMILY MEDICINE CLINIC | Age: 73
End: 2023-10-09

## 2023-10-09 NOTE — TELEPHONE ENCOUNTER
Patient was given prednisone and kenalog cream, the medications worked but the patients wife stated that he has bug bites again after using the cream and steroid. I ask if the patient has been working In his yard and they stated he has been.      Please Advise

## 2023-10-10 ENCOUNTER — OFFICE VISIT (OUTPATIENT)
Dept: FAMILY MEDICINE CLINIC | Age: 73
End: 2023-10-10
Payer: MEDICARE

## 2023-10-10 VITALS
DIASTOLIC BLOOD PRESSURE: 60 MMHG | OXYGEN SATURATION: 98 % | SYSTOLIC BLOOD PRESSURE: 170 MMHG | HEART RATE: 71 BPM | TEMPERATURE: 96.6 F

## 2023-10-10 DIAGNOSIS — L30.9 DERMATITIS: Primary | ICD-10-CM

## 2023-10-10 PROCEDURE — G8417 CALC BMI ABV UP PARAM F/U: HCPCS | Performed by: FAMILY MEDICINE

## 2023-10-10 PROCEDURE — 3017F COLORECTAL CA SCREEN DOC REV: CPT | Performed by: FAMILY MEDICINE

## 2023-10-10 PROCEDURE — 3078F DIAST BP <80 MM HG: CPT | Performed by: FAMILY MEDICINE

## 2023-10-10 PROCEDURE — G8427 DOCREV CUR MEDS BY ELIG CLIN: HCPCS | Performed by: FAMILY MEDICINE

## 2023-10-10 PROCEDURE — G8484 FLU IMMUNIZE NO ADMIN: HCPCS | Performed by: FAMILY MEDICINE

## 2023-10-10 PROCEDURE — 3077F SYST BP >= 140 MM HG: CPT | Performed by: FAMILY MEDICINE

## 2023-10-10 PROCEDURE — 1123F ACP DISCUSS/DSCN MKR DOCD: CPT | Performed by: FAMILY MEDICINE

## 2023-10-10 PROCEDURE — 1036F TOBACCO NON-USER: CPT | Performed by: FAMILY MEDICINE

## 2023-10-10 PROCEDURE — 99213 OFFICE O/P EST LOW 20 MIN: CPT | Performed by: FAMILY MEDICINE

## 2023-10-10 RX ORDER — HYDROXYZINE HYDROCHLORIDE 25 MG/1
25 TABLET, FILM COATED ORAL EVERY 8 HOURS PRN
Qty: 30 TABLET | Refills: 1 | Status: SHIPPED | OUTPATIENT
Start: 2023-10-10

## 2023-10-13 ASSESSMENT — ENCOUNTER SYMPTOMS
NAUSEA: 0
DIARRHEA: 0
VOMITING: 0
COUGH: 0
SHORTNESS OF BREATH: 0

## 2023-10-13 NOTE — PROGRESS NOTES
Do you think you can get him in? Would you recommend any treatment to try. He's pretty uncomfortable with the itching.
lb (106.6 kg)   04/04/22 212 lb 8 oz (96.4 kg)     There is no height or weight on file to calculate BMI.      5/24/2023     8:30 AM 3/28/2022     1:34 PM 9/23/2021    12:33 PM   PHQ Scores   PHQ2 Score 0 0 0   PHQ9 Score 0 0 0       Interpretation of Total Score Depression Severity: 1-4 = Minimal depression, 5-9 = Mild depression, 10-14 = Moderate depression, 15-19 = Moderately severe depression, 20-27 = Severe depression       GEN: Alert and oriented x 4 NAD, affect appropriate and obese, well hydrated, well developed. Has few lesions on right cheek  Has diffuse raised rash covering arms, legs (mostly inner), some on abdomen, diffusely on back  Some discrete papules, more larger slightly raised patches that efrain but are nodular  One on inner left wrist draining clear fluid   Lesions are dry and scaly  Lower half of lower legs looks very dry and scaly but less erythematous lesions    See pictures in media        ASSESSMENT AND PLAN:       Cleve Moya was seen today for rash. Diagnoses and all orders for this visit:    Dermatitis    Other orders  -     hydrOXYzine HCl (ATARAX) 25 MG tablet; Take 1 tablet by mouth every 8 hours as needed for Itching      Uncertain if what he had initially after the ants is even related to what he has now. Sounds legs were clearing until past week when more diffuse rash appeared. Pt reports change in detergent about ?2 weeks ago but he is not certain. Advised him to ask wife when and what she changed. Will send chart to derm for their opinion and further tx.            Portions of Note per  Luiz Pemberton CMA AAMA with corrections and edits per Kay Pedroza MD.  I agree with entirety of note and was present and performed history and physical.  I also confirm that the note above accurately reflects all work, treatment, procedures, and medical decision making performed by me, Kay Pedroza MD

## 2023-10-16 ENCOUNTER — OFFICE VISIT (OUTPATIENT)
Dept: DERMATOLOGY | Age: 73
End: 2023-10-16
Payer: MEDICARE

## 2023-10-16 ENCOUNTER — TELEPHONE (OUTPATIENT)
Dept: DERMATOLOGY | Age: 73
End: 2023-10-16

## 2023-10-16 DIAGNOSIS — L30.9 CHRONIC DERMATITIS: Primary | ICD-10-CM

## 2023-10-16 PROCEDURE — 1123F ACP DISCUSS/DSCN MKR DOCD: CPT | Performed by: DERMATOLOGY

## 2023-10-16 PROCEDURE — 99203 OFFICE O/P NEW LOW 30 MIN: CPT | Performed by: DERMATOLOGY

## 2023-10-16 PROCEDURE — 3017F COLORECTAL CA SCREEN DOC REV: CPT | Performed by: DERMATOLOGY

## 2023-10-16 PROCEDURE — 1036F TOBACCO NON-USER: CPT | Performed by: DERMATOLOGY

## 2023-10-16 PROCEDURE — 96372 THER/PROPH/DIAG INJ SC/IM: CPT | Performed by: DERMATOLOGY

## 2023-10-16 PROCEDURE — G8427 DOCREV CUR MEDS BY ELIG CLIN: HCPCS | Performed by: DERMATOLOGY

## 2023-10-16 PROCEDURE — G8417 CALC BMI ABV UP PARAM F/U: HCPCS | Performed by: DERMATOLOGY

## 2023-10-16 PROCEDURE — G8484 FLU IMMUNIZE NO ADMIN: HCPCS | Performed by: DERMATOLOGY

## 2023-10-16 RX ORDER — TRIAMCINOLONE ACETONIDE 1 MG/G
CREAM TOPICAL
Qty: 450 G | Refills: 0 | Status: SHIPPED | OUTPATIENT
Start: 2023-10-16

## 2023-10-16 RX ORDER — TRIAMCINOLONE ACETONIDE 40 MG/ML
80 INJECTION, SUSPENSION INTRA-ARTICULAR; INTRAMUSCULAR ONCE
Status: COMPLETED | OUTPATIENT
Start: 2023-10-16 | End: 2023-10-16

## 2023-10-16 RX ADMIN — TRIAMCINOLONE ACETONIDE 80 MG: 40 INJECTION, SUSPENSION INTRA-ARTICULAR; INTRAMUSCULAR at 17:11

## 2023-10-16 NOTE — PROGRESS NOTES
[facility]  Richard Bragg MD  [address]  1950    67 y.o. male     Date of Visit: 10/16/2023    Chief Complaint: rash    History of Present Illness:    He presents today for 2 to 3-month history of a progressively worsening markedly pruritic eruption. It initially appeared on his legs and subsequently spread to the chest and upper extremities. The only thing different is that he has been working in his sister's yard doing some cleaning up and there was also some spraying of weeds. He reports improvement in areas treated with triamcinolone 0.1% cream but he ran out quickly. He had only temporary improvement with a short prednisone taper. Review of Systems:  Gen: Feels well, good sense of health. Past Medical History, Family History, Surgical History, Medications and Allergies reviewed. Past Medical History:   Diagnosis Date    Essential hypertension 8/20/2021    Pneumonia 8/19/2021    Prostate cancer ([address]) 2/3/2022    Type 2 diabetes mellitus 1/11/2022     Past Surgical History:   Procedure Laterality Date    APPENDECTOMY      PROSTATECTOMY N/A 2/3/2022    ROBOTIC LAPAROSCOPIC RADICAL PROSTATECTOMY WITH BILATERAL LYMPH NODES DISSECTION WITH BILATERAL NERVE SPARING performed by Deneen Sanchez MD at [address] [place] [address]         No Known Allergies  Outpatient Medications Marked as Taking for the 10/16/23 encounter (Office Visit) with Neo Montana MD   Medication Sig Dispense Refill    triamcinolone (KENALOG) 0.1 % cream Apply to affected area twice daily for up to 2 weeks or until improved. 450 g 0    hydrOXYzine HCl (ATARAX) 25 MG tablet Take 1 tablet by mouth every 8 hours as needed for Itching 30 tablet 1    Tadalafil, PAH, 20 MG tablet              Physical Examination       Well appearing.     1.  Left upper chest, right upper abdomen, medial legs > remainder of anterior trunk and upper extremities with multiple

## 2023-10-16 NOTE — TELEPHONE ENCOUNTER
Patient referred by Dr Marcos Freitas for rash. Dr Vijaya Perrin agreeable to see patient.      Patient scheduled for today at 4:30pm.

## 2023-10-31 ENCOUNTER — OFFICE VISIT (OUTPATIENT)
Dept: FAMILY MEDICINE CLINIC | Age: 73
End: 2023-10-31
Payer: MEDICARE

## 2023-10-31 VITALS
DIASTOLIC BLOOD PRESSURE: 80 MMHG | BODY MASS INDEX: 36.65 KG/M2 | SYSTOLIC BLOOD PRESSURE: 148 MMHG | HEART RATE: 56 BPM | TEMPERATURE: 97.4 F | OXYGEN SATURATION: 98 % | WEIGHT: 234 LBS

## 2023-10-31 DIAGNOSIS — I10 ESSENTIAL HYPERTENSION: Primary | ICD-10-CM

## 2023-10-31 DIAGNOSIS — E11.9 TYPE 2 DIABETES MELLITUS WITHOUT COMPLICATION, WITHOUT LONG-TERM CURRENT USE OF INSULIN (HCC): ICD-10-CM

## 2023-10-31 PROCEDURE — 1036F TOBACCO NON-USER: CPT | Performed by: NURSE PRACTITIONER

## 2023-10-31 PROCEDURE — 3078F DIAST BP <80 MM HG: CPT | Performed by: NURSE PRACTITIONER

## 2023-10-31 PROCEDURE — G8427 DOCREV CUR MEDS BY ELIG CLIN: HCPCS | Performed by: NURSE PRACTITIONER

## 2023-10-31 PROCEDURE — G8417 CALC BMI ABV UP PARAM F/U: HCPCS | Performed by: NURSE PRACTITIONER

## 2023-10-31 PROCEDURE — 1123F ACP DISCUSS/DSCN MKR DOCD: CPT | Performed by: NURSE PRACTITIONER

## 2023-10-31 PROCEDURE — 3077F SYST BP >= 140 MM HG: CPT | Performed by: NURSE PRACTITIONER

## 2023-10-31 PROCEDURE — 3044F HG A1C LEVEL LT 7.0%: CPT | Performed by: NURSE PRACTITIONER

## 2023-10-31 PROCEDURE — 2022F DILAT RTA XM EVC RTNOPTHY: CPT | Performed by: NURSE PRACTITIONER

## 2023-10-31 PROCEDURE — G8484 FLU IMMUNIZE NO ADMIN: HCPCS | Performed by: NURSE PRACTITIONER

## 2023-10-31 PROCEDURE — 3017F COLORECTAL CA SCREEN DOC REV: CPT | Performed by: NURSE PRACTITIONER

## 2023-10-31 PROCEDURE — 99213 OFFICE O/P EST LOW 20 MIN: CPT | Performed by: NURSE PRACTITIONER

## 2023-10-31 RX ORDER — OXYBUTYNIN CHLORIDE 5 MG/1
TABLET, EXTENDED RELEASE ORAL
COMMUNITY
Start: 2023-10-10

## 2023-10-31 RX ORDER — AMLODIPINE BESYLATE 5 MG/1
5 TABLET ORAL DAILY
Qty: 30 TABLET | Refills: 0 | Status: SHIPPED | OUTPATIENT
Start: 2023-10-31

## 2023-11-06 ENCOUNTER — HOSPITAL ENCOUNTER (OUTPATIENT)
Age: 73
Discharge: HOME | End: 2023-11-06
Payer: MEDICARE

## 2023-11-06 DIAGNOSIS — I10 ESSENTIAL HYPERTENSION: ICD-10-CM

## 2023-11-06 DIAGNOSIS — E78.2 MIXED HYPERLIPIDEMIA: ICD-10-CM

## 2023-11-06 DIAGNOSIS — E11.9 TYPE 2 DIABETES MELLITUS WITHOUT COMPLICATION, WITHOUT LONG-TERM CURRENT USE OF INSULIN (HCC): ICD-10-CM

## 2023-11-06 LAB
ALBUMIN SERPL-MCNC: 4.2 G/DL (ref 3.4–5)
ALBUMIN/GLOB SERPL: 1.8 {RATIO} (ref 1.1–2.2)
ALP SERPL-CCNC: 87 U/L (ref 40–129)
ALT SERPL-CCNC: 67 U/L (ref 10–40)
ANION GAP SERPL CALCULATED.3IONS-SCNC: 12 MMOL/L (ref 3–16)
AST SERPL-CCNC: 33 U/L (ref 15–37)
BASOPHILS # BLD: 0 K/UL (ref 0–0.2)
BASOPHILS NFR BLD: 0.5 %
BILIRUB SERPL-MCNC: <0.2 MG/DL (ref 0–1)
BUN SERPL-MCNC: 16 MG/DL (ref 7–20)
CALCIUM SERPL-MCNC: 9.7 MG/DL (ref 8.3–10.6)
CHLORIDE SERPL-SCNC: 106 MMOL/L (ref 99–110)
CHOLEST SERPL-MCNC: 153 MG/DL (ref 0–199)
CO2 SERPL-SCNC: 26 MMOL/L (ref 21–32)
CREAT SERPL-MCNC: 1.1 MG/DL (ref 0.8–1.3)
CREAT UR-MCNC: 249.4 MG/DL (ref 39–259)
DEPRECATED RDW RBC AUTO: 15 % (ref 12.4–15.4)
EOSINOPHIL # BLD: 0.1 K/UL (ref 0–0.6)
EOSINOPHIL NFR BLD: 2 %
GFR SERPLBLD CREATININE-BSD FMLA CKD-EPI: >60 ML/MIN/{1.73_M2}
GLUCOSE P FAST SERPL-MCNC: 96 MG/DL (ref 70–99)
HCT VFR BLD AUTO: 42.9 % (ref 40.5–52.5)
HDLC SERPL-MCNC: 43 MG/DL (ref 40–60)
HGB BLD-MCNC: 14.5 G/DL (ref 13.5–17.5)
LDL CHOLESTEROL CALCULATED: 79 MG/DL
LYMPHOCYTES # BLD: 1.7 K/UL (ref 1–5.1)
LYMPHOCYTES NFR BLD: 26.9 %
MCH RBC QN AUTO: 29.9 PG (ref 26–34)
MCHC RBC AUTO-ENTMCNC: 33.7 G/DL (ref 31–36)
MCV RBC AUTO: 88.8 FL (ref 80–100)
MICROALBUMIN UR DL<=1MG/L-MCNC: <1.2 MG/DL
MICROALBUMIN/CREAT UR: NORMAL MG/G (ref 0–30)
MONOCYTES # BLD: 0.5 K/UL (ref 0–1.3)
MONOCYTES NFR BLD: 8.4 %
NEUTROPHILS # BLD: 3.9 K/UL (ref 1.7–7.7)
NEUTROPHILS NFR BLD: 62.2 %
PLATELET # BLD AUTO: 212 K/UL (ref 135–450)
PMV BLD AUTO: 7.1 FL (ref 5–10.5)
POTASSIUM SERPL-SCNC: 4.2 MMOL/L (ref 3.5–5.1)
PROT SERPL-MCNC: 6.6 G/DL (ref 6.4–8.2)
RBC # BLD AUTO: 4.83 M/UL (ref 4.2–5.9)
SODIUM SERPL-SCNC: 144 MMOL/L (ref 136–145)
TRIGL SERPL-MCNC: 157 MG/DL (ref 0–150)
VLDLC SERPL CALC-MCNC: 31 MG/DL
WBC # BLD AUTO: 6.3 K/UL (ref 4–11)

## 2023-11-06 PROCEDURE — 36415 COLL VENOUS BLD VENIPUNCTURE: CPT

## 2023-11-06 PROCEDURE — 80053 COMPREHEN METABOLIC PANEL: CPT

## 2023-11-06 PROCEDURE — 82570 ASSAY OF URINE CREATININE: CPT

## 2023-11-06 PROCEDURE — 80061 LIPID PANEL: CPT

## 2023-11-06 PROCEDURE — 83036 HEMOGLOBIN GLYCOSYLATED A1C: CPT

## 2023-11-06 PROCEDURE — 85025 COMPLETE CBC W/AUTO DIFF WBC: CPT

## 2023-11-06 PROCEDURE — 82043 UR ALBUMIN QUANTITATIVE: CPT

## 2023-11-07 LAB
EST. AVERAGE GLUCOSE BLD GHB EST-MCNC: 157.1 MG/DL
HBA1C MFR BLD: 7.1 %

## 2023-11-14 ENCOUNTER — OFFICE VISIT (OUTPATIENT)
Dept: DERMATOLOGY | Age: 73
End: 2023-11-14
Payer: MEDICARE

## 2023-11-14 DIAGNOSIS — L30.9 CHRONIC DERMATITIS: Primary | ICD-10-CM

## 2023-11-14 PROCEDURE — G8427 DOCREV CUR MEDS BY ELIG CLIN: HCPCS | Performed by: DERMATOLOGY

## 2023-11-14 PROCEDURE — G8417 CALC BMI ABV UP PARAM F/U: HCPCS | Performed by: DERMATOLOGY

## 2023-11-14 PROCEDURE — 1123F ACP DISCUSS/DSCN MKR DOCD: CPT | Performed by: DERMATOLOGY

## 2023-11-14 PROCEDURE — 99213 OFFICE O/P EST LOW 20 MIN: CPT | Performed by: DERMATOLOGY

## 2023-11-14 PROCEDURE — 3017F COLORECTAL CA SCREEN DOC REV: CPT | Performed by: DERMATOLOGY

## 2023-11-14 PROCEDURE — G8484 FLU IMMUNIZE NO ADMIN: HCPCS | Performed by: DERMATOLOGY

## 2023-11-14 PROCEDURE — 1036F TOBACCO NON-USER: CPT | Performed by: DERMATOLOGY

## 2023-11-14 RX ORDER — CLOBETASOL PROPIONATE 0.5 MG/G
CREAM TOPICAL
Qty: 30 G | Refills: 2 | Status: SHIPPED | OUTPATIENT
Start: 2023-11-14

## 2023-11-14 RX ORDER — TRIAMCINOLONE ACETONIDE 1 MG/G
CREAM TOPICAL
Qty: 450 G | Refills: 0 | Status: SHIPPED | OUTPATIENT
Start: 2023-11-14

## 2023-11-14 NOTE — PROGRESS NOTES
Skin exam of the trunk and extremities mostly clear with the exception of few crusted and scaly erythematous macules on the lower back and extensor forearms. Assessment and Plan     1. Chronic dermatitis - previously extensive, nearly clear after intramuscular Kenalog and triamcinolone 0.1% cream    Clobetasol cream twice daily to residual areas on the forearms and lower back until cleared. Encouraged daily use of emollients. Return to clinic as needed.      --Babs Roberson MD

## 2023-11-30 DIAGNOSIS — I10 ESSENTIAL HYPERTENSION: ICD-10-CM

## 2023-11-30 RX ORDER — AMLODIPINE BESYLATE 5 MG/1
5 TABLET ORAL DAILY
Qty: 30 TABLET | Refills: 0 | Status: SHIPPED | OUTPATIENT
Start: 2023-11-30

## 2023-12-05 ENCOUNTER — OFFICE VISIT (OUTPATIENT)
Dept: FAMILY MEDICINE CLINIC | Age: 73
End: 2023-12-05
Payer: MEDICARE

## 2023-12-05 VITALS
TEMPERATURE: 97.6 F | BODY MASS INDEX: 35.71 KG/M2 | SYSTOLIC BLOOD PRESSURE: 138 MMHG | HEART RATE: 61 BPM | WEIGHT: 228 LBS | OXYGEN SATURATION: 98 % | DIASTOLIC BLOOD PRESSURE: 70 MMHG

## 2023-12-05 DIAGNOSIS — E11.9 TYPE 2 DIABETES MELLITUS WITHOUT COMPLICATION, WITHOUT LONG-TERM CURRENT USE OF INSULIN (HCC): ICD-10-CM

## 2023-12-05 DIAGNOSIS — Z82.49 FAMILY HISTORY OF HEART ATTACK: ICD-10-CM

## 2023-12-05 DIAGNOSIS — L30.9 DERMATITIS: ICD-10-CM

## 2023-12-05 DIAGNOSIS — I10 ESSENTIAL HYPERTENSION: Primary | ICD-10-CM

## 2023-12-05 PROCEDURE — 3017F COLORECTAL CA SCREEN DOC REV: CPT | Performed by: NURSE PRACTITIONER

## 2023-12-05 PROCEDURE — G8484 FLU IMMUNIZE NO ADMIN: HCPCS | Performed by: NURSE PRACTITIONER

## 2023-12-05 PROCEDURE — 3075F SYST BP GE 130 - 139MM HG: CPT | Performed by: NURSE PRACTITIONER

## 2023-12-05 PROCEDURE — 1036F TOBACCO NON-USER: CPT | Performed by: NURSE PRACTITIONER

## 2023-12-05 PROCEDURE — 2022F DILAT RTA XM EVC RTNOPTHY: CPT | Performed by: NURSE PRACTITIONER

## 2023-12-05 PROCEDURE — 3051F HG A1C>EQUAL 7.0%<8.0%: CPT | Performed by: NURSE PRACTITIONER

## 2023-12-05 PROCEDURE — G8427 DOCREV CUR MEDS BY ELIG CLIN: HCPCS | Performed by: NURSE PRACTITIONER

## 2023-12-05 PROCEDURE — 3078F DIAST BP <80 MM HG: CPT | Performed by: NURSE PRACTITIONER

## 2023-12-05 PROCEDURE — G8417 CALC BMI ABV UP PARAM F/U: HCPCS | Performed by: NURSE PRACTITIONER

## 2023-12-05 PROCEDURE — 99214 OFFICE O/P EST MOD 30 MIN: CPT | Performed by: NURSE PRACTITIONER

## 2023-12-05 PROCEDURE — 1123F ACP DISCUSS/DSCN MKR DOCD: CPT | Performed by: NURSE PRACTITIONER

## 2023-12-05 RX ORDER — AMLODIPINE BESYLATE 5 MG/1
5 TABLET ORAL DAILY
Qty: 90 TABLET | Refills: 1 | Status: SHIPPED | OUTPATIENT
Start: 2023-12-05

## 2023-12-05 RX ORDER — AMLODIPINE BESYLATE 5 MG/1
5 TABLET ORAL DAILY
Qty: 30 TABLET | Refills: 0 | Status: SHIPPED | OUTPATIENT
Start: 2023-12-05 | End: 2023-12-05 | Stop reason: SDUPTHER

## 2023-12-05 RX ORDER — BETAMETHASONE DIPROPIONATE 0.05 %
OINTMENT (GRAM) TOPICAL
Qty: 50 G | Refills: 1 | Status: SHIPPED | OUTPATIENT
Start: 2023-12-05

## 2023-12-05 NOTE — PROGRESS NOTES
by mouth daily  Dispense: 90 tablet; Refill: 1    2. Family history of heart attack  - Nahomy Espinoza MD, Cardiology ([facility]), [facility]    3. Dermatitis  Advised continued follow up dermatology  - OMAYRA Reflex to Antibody Cascade; Future  - RHEUMATOID FACTOR; Future  - Sedimentation Rate; Future  - betamethasone dipropionate 0.05 % ointment; Apply topically daily. Dispense: 50 g; Refill: 1    4. Type 2 diabetes mellitus without complication, without long-term current use of insulin (HCC)  Uncontrolled  Advised low CHO diet  Weight loss  - metFORMIN (GLUCOPHAGE) 500 MG tablet; Take 1 tablet by mouth daily (with breakfast)  Dispense: 90 tablet; Refill: 1      Return in about 6 months (around 6/5/2024) for annual check up. This dictation was generated by voice recognition computer software. Although all attempts are made to edit the dictation for accuracy, there may be errors in the transcription that are not intended.

## 2023-12-06 ENCOUNTER — TELEPHONE (OUTPATIENT)
Dept: CARDIOLOGY CLINIC | Age: 73
End: 2023-12-06

## 2023-12-06 NOTE — TELEPHONE ENCOUNTER
Patient was referred by Vane Burgos to the [address] [place] [address]. Patient has been scheduled w/[facility] for 1/23/24 at 9:30am.  Patient verbalized understanding of appointment instructions. Call complete.

## 2023-12-27 RX ORDER — TRIAMCINOLONE ACETONIDE 1 MG/G
CREAM TOPICAL
Qty: 454 G | Refills: 0 | Status: SHIPPED | OUTPATIENT
Start: 2023-12-27

## 2024-01-18 PROBLEM — Z82.49 FAMILY HISTORY OF CORONARY ARTERY DISEASE: Status: ACTIVE | Noted: 2024-01-18

## 2024-01-18 NOTE — PATIENT INSTRUCTIONS
Thank you for coming to see me today     Continue to be active     Watch sugar and carbohydrate intake    Start rosuvastatin (crestor) for cholesterol levels    Complete echocardiogram (ultrasound of the heart)    Keep journal of daily blood pressures     Start valsartan daily for blood pressures    Check blood work in about 2-4 weeks to check kidney function

## 2024-01-18 NOTE — PROGRESS NOTES
[facility]    1/23/2024    Referring Provider: Mary Chen APRN - NP    HISTORY:  Mohamud Lima is a 73 y.o. male presenting as a new patient referred by his PCP for a family history of heart disease. He is concerned that his brother and father have had heart disease and previous MIs. Denied any chest pain, palpitations, or SOB at office visit with PCP.    Today he arrives with his wife and is ambulatory. He reports that he stays busy with day to day life. He is able to go up and down stairs without difficulty. Denies chest pain/pressure/squeezing/tightness, dizziness/lightheadedness, shortness of breath/dyspnea on exertion. Every so often he has some mild edema in his legs and some dyspnea while doing things, but all in all doing well. His biggest complaint is itching of his skin, which has yet to be diagnosed. He has been much more focused on his diet since A1c was 7 in November. He has cut out sweet tea and other desserts.         REVIEW OF SYSTEMS:  A complete review of systems was reviewed and is negative except as noted in the history of present illness.    Prior to Visit Medications    Medication Sig Taking? Authorizing Provider   rosuvastatin (CRESTOR) 20 MG tablet Take 1 tablet by mouth daily Yes Randall Novak MD   valsartan (DIOVAN) 80 MG tablet Take 1 tablet by mouth daily Yes Randall Novak MD   valsartan (DIOVAN) 80 MG tablet Take 1 tablet by mouth daily Yes Randall Novak MD   rosuvastatin (CRESTOR) 20 MG tablet Take 1 tablet by mouth daily Yes Randall Novak MD   triamcinolone (KENALOG) 0.1 % cream APPLY TO AFFECTED AREA(S) TWO TIMES A DAY FOR UP TO 2 WEEKS OR UNTIL IMPROVED Yes Nader Fournier MD   amLODIPine (NORVASC) 5 MG tablet Take 1 tablet by mouth daily Yes Mary Chen APRN - NP   metFORMIN (GLUCOPHAGE) 500 MG tablet Take 1 tablet by mouth daily (with breakfast) Yes Mary Chen APRN - NP   betamethasone dipropionate 0.05 %

## 2024-01-22 ENCOUNTER — TELEPHONE (OUTPATIENT)
Dept: FAMILY MEDICINE CLINIC | Age: 74
End: 2024-01-22

## 2024-01-22 NOTE — TELEPHONE ENCOUNTER
----- Message from Naseem Godwin sent at 1/22/2024  1:37 PM EST -----  Subject: Referral Request    Reason for referral request? Patient wife called to get patient a blood   test ordered to find out what is causing this constant itching he's been   dealing with.  Provider patient wants to be referred to(if known):     Provider Phone Number(if known):    Additional Information for Provider?   ---------------------------------------------------------------------------  --------------  CALL BACK INFO    [phone]; OK to leave message on voicemail  ---------------------------------------------------------------------------  --------------

## 2024-01-23 ENCOUNTER — OFFICE VISIT (OUTPATIENT)
Dept: CARDIOLOGY CLINIC | Age: 74
End: 2024-01-23
Payer: MEDICARE

## 2024-01-23 VITALS
OXYGEN SATURATION: 97 % | HEIGHT: 68 IN | SYSTOLIC BLOOD PRESSURE: 148 MMHG | BODY MASS INDEX: 35.43 KG/M2 | WEIGHT: 233.8 LBS | HEART RATE: 83 BPM | DIASTOLIC BLOOD PRESSURE: 80 MMHG

## 2024-01-23 DIAGNOSIS — G47.33 OSA (OBSTRUCTIVE SLEEP APNEA): ICD-10-CM

## 2024-01-23 DIAGNOSIS — Z82.49 FAMILY HISTORY OF CORONARY ARTERY DISEASE: Primary | ICD-10-CM

## 2024-01-23 DIAGNOSIS — R06.02 SOB (SHORTNESS OF BREATH): ICD-10-CM

## 2024-01-23 DIAGNOSIS — E11.9 TYPE 2 DIABETES MELLITUS WITHOUT COMPLICATION, WITHOUT LONG-TERM CURRENT USE OF INSULIN (HCC): ICD-10-CM

## 2024-01-23 DIAGNOSIS — I10 ESSENTIAL HYPERTENSION: ICD-10-CM

## 2024-01-23 DIAGNOSIS — E78.5 HYPERLIPIDEMIA, UNSPECIFIED HYPERLIPIDEMIA TYPE: ICD-10-CM

## 2024-01-23 PROCEDURE — G8484 FLU IMMUNIZE NO ADMIN: HCPCS | Performed by: STUDENT IN AN ORGANIZED HEALTH CARE EDUCATION/TRAINING PROGRAM

## 2024-01-23 PROCEDURE — 3077F SYST BP >= 140 MM HG: CPT | Performed by: STUDENT IN AN ORGANIZED HEALTH CARE EDUCATION/TRAINING PROGRAM

## 2024-01-23 PROCEDURE — 1123F ACP DISCUSS/DSCN MKR DOCD: CPT | Performed by: STUDENT IN AN ORGANIZED HEALTH CARE EDUCATION/TRAINING PROGRAM

## 2024-01-23 PROCEDURE — 99204 OFFICE O/P NEW MOD 45 MIN: CPT | Performed by: STUDENT IN AN ORGANIZED HEALTH CARE EDUCATION/TRAINING PROGRAM

## 2024-01-23 PROCEDURE — 3046F HEMOGLOBIN A1C LEVEL >9.0%: CPT | Performed by: STUDENT IN AN ORGANIZED HEALTH CARE EDUCATION/TRAINING PROGRAM

## 2024-01-23 PROCEDURE — 3017F COLORECTAL CA SCREEN DOC REV: CPT | Performed by: STUDENT IN AN ORGANIZED HEALTH CARE EDUCATION/TRAINING PROGRAM

## 2024-01-23 PROCEDURE — 3079F DIAST BP 80-89 MM HG: CPT | Performed by: STUDENT IN AN ORGANIZED HEALTH CARE EDUCATION/TRAINING PROGRAM

## 2024-01-23 PROCEDURE — G8417 CALC BMI ABV UP PARAM F/U: HCPCS | Performed by: STUDENT IN AN ORGANIZED HEALTH CARE EDUCATION/TRAINING PROGRAM

## 2024-01-23 PROCEDURE — G8427 DOCREV CUR MEDS BY ELIG CLIN: HCPCS | Performed by: STUDENT IN AN ORGANIZED HEALTH CARE EDUCATION/TRAINING PROGRAM

## 2024-01-23 PROCEDURE — 2022F DILAT RTA XM EVC RTNOPTHY: CPT | Performed by: STUDENT IN AN ORGANIZED HEALTH CARE EDUCATION/TRAINING PROGRAM

## 2024-01-23 PROCEDURE — 93000 ELECTROCARDIOGRAM COMPLETE: CPT | Performed by: STUDENT IN AN ORGANIZED HEALTH CARE EDUCATION/TRAINING PROGRAM

## 2024-01-23 PROCEDURE — 1036F TOBACCO NON-USER: CPT | Performed by: STUDENT IN AN ORGANIZED HEALTH CARE EDUCATION/TRAINING PROGRAM

## 2024-01-23 RX ORDER — ROSUVASTATIN CALCIUM 20 MG/1
20 TABLET, COATED ORAL DAILY
Qty: 90 TABLET | Refills: 1 | Status: SHIPPED | OUTPATIENT
Start: 2024-01-23

## 2024-01-23 RX ORDER — ROSUVASTATIN CALCIUM 20 MG/1
20 TABLET, COATED ORAL DAILY
Qty: 30 TABLET | Refills: 0 | Status: SHIPPED | OUTPATIENT
Start: 2024-01-23

## 2024-01-23 RX ORDER — VALSARTAN 80 MG/1
80 TABLET ORAL DAILY
Qty: 90 TABLET | Refills: 1 | Status: SHIPPED | OUTPATIENT
Start: 2024-01-23

## 2024-01-23 RX ORDER — VALSARTAN 80 MG/1
80 TABLET ORAL DAILY
Qty: 30 TABLET | Refills: 0 | Status: SHIPPED | OUTPATIENT
Start: 2024-01-23

## 2024-01-25 ENCOUNTER — HOSPITAL ENCOUNTER (OUTPATIENT)
Age: 74
Discharge: HOME | End: 2024-01-25
Payer: MEDICARE

## 2024-01-25 DIAGNOSIS — I10 ESSENTIAL HYPERTENSION: ICD-10-CM

## 2024-01-25 DIAGNOSIS — L30.9 DERMATITIS: ICD-10-CM

## 2024-01-25 LAB
ANION GAP SERPL CALCULATED.3IONS-SCNC: 11 MMOL/L (ref 3–16)
BUN SERPL-MCNC: 25 MG/DL (ref 7–20)
CALCIUM SERPL-MCNC: 9.6 MG/DL (ref 8.3–10.6)
CHLORIDE SERPL-SCNC: 106 MMOL/L (ref 99–110)
CO2 SERPL-SCNC: 26 MMOL/L (ref 21–32)
CREAT SERPL-MCNC: 1.1 MG/DL (ref 0.8–1.3)
ERYTHROCYTE [SEDIMENTATION RATE] IN BLOOD BY WESTERGREN METHOD: 10 MM/HR (ref 0–20)
GFR SERPLBLD CREATININE-BSD FMLA CKD-EPI: >60 ML/MIN/{1.73_M2}
GLUCOSE SERPL-MCNC: 116 MG/DL (ref 70–99)
POTASSIUM SERPL-SCNC: 4.3 MMOL/L (ref 3.5–5.1)
RHEUMATOID FACT SER IA-ACNC: 17 IU/ML
SODIUM SERPL-SCNC: 143 MMOL/L (ref 136–145)

## 2024-01-25 PROCEDURE — 85652 RBC SED RATE AUTOMATED: CPT

## 2024-01-25 PROCEDURE — 86431 RHEUMATOID FACTOR QUANT: CPT

## 2024-01-25 PROCEDURE — 86038 ANTINUCLEAR ANTIBODIES: CPT

## 2024-01-25 PROCEDURE — 36415 COLL VENOUS BLD VENIPUNCTURE: CPT

## 2024-01-25 PROCEDURE — 80048 BASIC METABOLIC PNL TOTAL CA: CPT

## 2024-01-26 LAB — ANA SER QL IA: NEGATIVE

## 2024-01-28 DIAGNOSIS — R76.8 ELEVATED RHEUMATOID FACTOR: Primary | ICD-10-CM

## 2024-02-02 ENCOUNTER — HOSPITAL ENCOUNTER (OUTPATIENT)
Dept: NON INVASIVE DIAGNOSTICS | Age: 74
Discharge: HOME | End: 2024-02-02
Payer: MEDICARE

## 2024-02-02 ENCOUNTER — TELEPHONE (OUTPATIENT)
Dept: CARDIOLOGY CLINIC | Age: 74
End: 2024-02-02

## 2024-02-02 DIAGNOSIS — I10 ESSENTIAL HYPERTENSION: ICD-10-CM

## 2024-02-02 DIAGNOSIS — R06.02 SOB (SHORTNESS OF BREATH): ICD-10-CM

## 2024-02-02 DIAGNOSIS — I77.89 ENLARGED AORTA (HCC): Primary | ICD-10-CM

## 2024-02-02 PROCEDURE — 6360000004 HC RX CONTRAST MEDICATION: Performed by: STUDENT IN AN ORGANIZED HEALTH CARE EDUCATION/TRAINING PROGRAM

## 2024-02-02 PROCEDURE — C8929 TTE W OR WO FOL WCON,DOPPLER: HCPCS

## 2024-02-02 RX ADMIN — PERFLUTREN 1.5 ML: 6.52 INJECTION, SUSPENSION INTRAVENOUS at 16:30

## 2024-02-02 NOTE — TELEPHONE ENCOUNTER
Reviewed echo results with [facility]. Echo looks good, did show aorta is enlarged. Recommends CT of chest prior to next office visit to get exact measurements.    Attempted to call and review with pt. No answer. LMOM with callback.

## 2024-02-06 ENCOUNTER — OFFICE VISIT (OUTPATIENT)
Dept: FAMILY MEDICINE CLINIC | Age: 74
End: 2024-02-06
Payer: MEDICARE

## 2024-02-06 VITALS — TEMPERATURE: 97.9 F | OXYGEN SATURATION: 96 % | HEART RATE: 58 BPM

## 2024-02-06 DIAGNOSIS — L30.9 DERMATITIS: Primary | ICD-10-CM

## 2024-02-06 PROCEDURE — 3017F COLORECTAL CA SCREEN DOC REV: CPT | Performed by: NURSE PRACTITIONER

## 2024-02-06 PROCEDURE — G8484 FLU IMMUNIZE NO ADMIN: HCPCS | Performed by: NURSE PRACTITIONER

## 2024-02-06 PROCEDURE — G8427 DOCREV CUR MEDS BY ELIG CLIN: HCPCS | Performed by: NURSE PRACTITIONER

## 2024-02-06 PROCEDURE — 99213 OFFICE O/P EST LOW 20 MIN: CPT | Performed by: NURSE PRACTITIONER

## 2024-02-06 PROCEDURE — 1036F TOBACCO NON-USER: CPT | Performed by: NURSE PRACTITIONER

## 2024-02-06 PROCEDURE — 1123F ACP DISCUSS/DSCN MKR DOCD: CPT | Performed by: NURSE PRACTITIONER

## 2024-02-06 PROCEDURE — G8417 CALC BMI ABV UP PARAM F/U: HCPCS | Performed by: NURSE PRACTITIONER

## 2024-02-06 RX ORDER — PREDNISONE 10 MG/1
TABLET ORAL
Qty: 21 TABLET | Refills: 0 | Status: SHIPPED | OUTPATIENT
Start: 2024-02-06

## 2024-02-06 RX ORDER — HYDROXYZINE HYDROCHLORIDE 25 MG/1
25 TABLET, FILM COATED ORAL EVERY 8 HOURS PRN
Qty: 30 TABLET | Refills: 1 | Status: SHIPPED | OUTPATIENT
Start: 2024-02-06

## 2024-02-06 ASSESSMENT — PATIENT HEALTH QUESTIONNAIRE - PHQ9
SUM OF ALL RESPONSES TO PHQ QUESTIONS 1-9: 0
SUM OF ALL RESPONSES TO PHQ QUESTIONS 1-9: 0
2. FEELING DOWN, DEPRESSED OR HOPELESS: 0
1. LITTLE INTEREST OR PLEASURE IN DOING THINGS: 0
SUM OF ALL RESPONSES TO PHQ QUESTIONS 1-9: 0
SUM OF ALL RESPONSES TO PHQ QUESTIONS 1-9: 0
SUM OF ALL RESPONSES TO PHQ9 QUESTIONS 1 & 2: 0

## 2024-02-06 NOTE — PROGRESS NOTES
Mohamud Lima  : 1950  Encounter date: 2024    This colleen 73 y.o. male who presents with  Chief Complaint   Patient presents with    Rash     Left ring finger x1wk       History of present illness:    HPI Pt is 73 year old male with complaints for L ring finger, excoriation, blistering and weeping.  Pt reports itchy.  Started with blisters near cuticle.  Applied neosporin, rash worsened, continued to apply topical and hydrogen peroxide.  Denies fevers, denies surrounding erythema, non tender.  Pt is established with dermatology, Dr. Fournier.    Current Outpatient Medications on File Prior to Visit   Medication Sig Dispense Refill    rosuvastatin (CRESTOR) 20 MG tablet Take 1 tablet by mouth daily 30 tablet 0    valsartan (DIOVAN) 80 MG tablet Take 1 tablet by mouth daily 30 tablet 0    valsartan (DIOVAN) 80 MG tablet Take 1 tablet by mouth daily 90 tablet 1    rosuvastatin (CRESTOR) 20 MG tablet Take 1 tablet by mouth daily 90 tablet 1    triamcinolone (KENALOG) 0.1 % cream APPLY TO AFFECTED AREA(S) TWO TIMES A DAY FOR UP TO 2 WEEKS OR UNTIL IMPROVED 454 g 0    amLODIPine (NORVASC) 5 MG tablet Take 1 tablet by mouth daily 90 tablet 1    metFORMIN (GLUCOPHAGE) 500 MG tablet Take 1 tablet by mouth daily (with breakfast) 90 tablet 1    betamethasone dipropionate 0.05 % ointment Apply topically daily. 50 g 1    clobetasol (TEMOVATE) 0.05 % cream Apply to affected areas on the lower back and forearms twice daily for up to 2 weeks or until improved. 30 g 2    oxybutynin (DITROPAN-XL) 5 MG extended release tablet       Tadalafil, PAH, 20 MG tablet        No current facility-administered medications on file prior to visit.      Allergies   Allergen Reactions    Neomycin Dermatitis     Past Medical History:   Diagnosis Date    Essential hypertension 2021    Pneumonia 2021    Prostate cancer (HCC) 2/3/2022    Type 2 diabetes mellitus 2022      Past Surgical History:   Procedure Laterality Date

## 2024-02-08 NOTE — TELEPHONE ENCOUNTER
Called Mohamud and his wife to review his echo results. His echo looks good. His aorta did look enlarged. [facility] recommends a CT of the chest to better assess his aorta and the get exact measurements. His CT is scheduled for February 15th. He and his wife voiced understanding and was in agreement with plan.

## 2024-02-15 ENCOUNTER — HOSPITAL ENCOUNTER (OUTPATIENT)
Dept: CT IMAGING | Age: 74
Discharge: HOME | End: 2024-02-15
Attending: STUDENT IN AN ORGANIZED HEALTH CARE EDUCATION/TRAINING PROGRAM
Payer: MEDICARE

## 2024-02-15 DIAGNOSIS — I77.89 ENLARGED AORTA (HCC): ICD-10-CM

## 2024-02-15 PROCEDURE — 71275 CT ANGIOGRAPHY CHEST: CPT

## 2024-02-15 PROCEDURE — 6360000004 HC RX CONTRAST MEDICATION: Performed by: STUDENT IN AN ORGANIZED HEALTH CARE EDUCATION/TRAINING PROGRAM

## 2024-02-15 RX ADMIN — IOPAMIDOL 75 ML: 755 INJECTION, SOLUTION INTRAVENOUS at 07:11

## 2024-03-05 DIAGNOSIS — Z82.49 FAMILY HISTORY OF CORONARY ARTERY DISEASE: ICD-10-CM

## 2024-03-05 DIAGNOSIS — E78.5 HYPERLIPIDEMIA, UNSPECIFIED HYPERLIPIDEMIA TYPE: ICD-10-CM

## 2024-03-05 RX ORDER — ROSUVASTATIN CALCIUM 20 MG/1
20 TABLET, COATED ORAL DAILY
Qty: 90 TABLET | Refills: 3 | Status: SHIPPED | OUTPATIENT
Start: 2024-03-05

## 2024-03-11 ENCOUNTER — TELEPHONE (OUTPATIENT)
Dept: FAMILY MEDICINE CLINIC | Age: 74
End: 2024-03-11

## 2024-03-11 NOTE — TELEPHONE ENCOUNTER
The medication prescribed on 3/5/24 is not working for the rash. Please give him a call to let him know what he should do next. The call back number is [phone].    Please advise.

## 2024-03-12 ENCOUNTER — OFFICE VISIT (OUTPATIENT)
Dept: FAMILY MEDICINE CLINIC | Age: 74
End: 2024-03-12
Payer: MEDICARE

## 2024-03-12 VITALS — TEMPERATURE: 97.5 F | OXYGEN SATURATION: 97 % | HEART RATE: 63 BPM

## 2024-03-12 DIAGNOSIS — L98.9 SKIN LESION: ICD-10-CM

## 2024-03-12 DIAGNOSIS — L30.9 DERMATITIS: ICD-10-CM

## 2024-03-12 PROCEDURE — G8484 FLU IMMUNIZE NO ADMIN: HCPCS | Performed by: NURSE PRACTITIONER

## 2024-03-12 PROCEDURE — 3017F COLORECTAL CA SCREEN DOC REV: CPT | Performed by: NURSE PRACTITIONER

## 2024-03-12 PROCEDURE — 1036F TOBACCO NON-USER: CPT | Performed by: NURSE PRACTITIONER

## 2024-03-12 PROCEDURE — 1123F ACP DISCUSS/DSCN MKR DOCD: CPT | Performed by: NURSE PRACTITIONER

## 2024-03-12 PROCEDURE — G8417 CALC BMI ABV UP PARAM F/U: HCPCS | Performed by: NURSE PRACTITIONER

## 2024-03-12 PROCEDURE — G8427 DOCREV CUR MEDS BY ELIG CLIN: HCPCS | Performed by: NURSE PRACTITIONER

## 2024-03-12 PROCEDURE — 99213 OFFICE O/P EST LOW 20 MIN: CPT | Performed by: NURSE PRACTITIONER

## 2024-03-12 RX ORDER — PREDNISONE 10 MG/1
TABLET ORAL
Qty: 21 TABLET | Refills: 0 | Status: SHIPPED | OUTPATIENT
Start: 2024-03-12

## 2024-03-12 RX ORDER — VALACYCLOVIR HYDROCHLORIDE 1 G/1
1000 TABLET, FILM COATED ORAL 3 TIMES DAILY
Qty: 21 TABLET | Refills: 0 | Status: SHIPPED | OUTPATIENT
Start: 2024-03-12 | End: 2024-03-19

## 2024-03-14 ENCOUNTER — TELEPHONE (OUTPATIENT)
Dept: CARDIOLOGY CLINIC | Age: 74
End: 2024-03-14

## 2024-03-14 ENCOUNTER — HOSPITAL ENCOUNTER (OUTPATIENT)
Age: 74
Discharge: HOME | End: 2024-03-14
Payer: MEDICARE

## 2024-03-14 DIAGNOSIS — L98.9 SKIN LESION: ICD-10-CM

## 2024-03-14 DIAGNOSIS — B95.0 STREPTOCOCCUS INFECTION, GROUP A: Primary | ICD-10-CM

## 2024-03-14 DIAGNOSIS — L30.9 DERMATITIS: ICD-10-CM

## 2024-03-14 LAB
BASOPHILS # BLD: 0.1 K/UL (ref 0–0.2)
BASOPHILS NFR BLD: 0.8 %
DEPRECATED RDW RBC AUTO: 14.4 % (ref 12.4–15.4)
EOSINOPHIL # BLD: 0 K/UL (ref 0–0.6)
EOSINOPHIL NFR BLD: 0 %
ERYTHROCYTE [SEDIMENTATION RATE] IN BLOOD BY WESTERGREN METHOD: 14 MM/HR (ref 0–20)
HCT VFR BLD AUTO: 36.9 % (ref 40.5–52.5)
HGB BLD-MCNC: 12.7 G/DL (ref 13.5–17.5)
LYMPHOCYTES # BLD: 1.7 K/UL (ref 1–5.1)
LYMPHOCYTES NFR BLD: 12.5 %
MCH RBC QN AUTO: 29.8 PG (ref 26–34)
MCHC RBC AUTO-ENTMCNC: 34.4 G/DL (ref 31–36)
MCV RBC AUTO: 86.8 FL (ref 80–100)
MONOCYTES # BLD: 1 K/UL (ref 0–1.3)
MONOCYTES NFR BLD: 7.5 %
NEUTROPHILS # BLD: 10.8 K/UL (ref 1.7–7.7)
NEUTROPHILS NFR BLD: 79.2 %
PLATELET # BLD AUTO: 237 K/UL (ref 135–450)
PMV BLD AUTO: 7.1 FL (ref 5–10.5)
RBC # BLD AUTO: 4.24 M/UL (ref 4.2–5.9)
RHEUMATOID FACT SER IA-ACNC: 18 IU/ML
WBC # BLD AUTO: 13.6 K/UL (ref 4–11)

## 2024-03-14 PROCEDURE — 36415 COLL VENOUS BLD VENIPUNCTURE: CPT

## 2024-03-14 PROCEDURE — 85652 RBC SED RATE AUTOMATED: CPT

## 2024-03-14 PROCEDURE — 85025 COMPLETE CBC W/AUTO DIFF WBC: CPT

## 2024-03-14 PROCEDURE — 86431 RHEUMATOID FACTOR QUANT: CPT

## 2024-03-14 PROCEDURE — 86038 ANTINUCLEAR ANTIBODIES: CPT

## 2024-03-14 RX ORDER — AMOXICILLIN 500 MG/1
500 CAPSULE ORAL 2 TIMES DAILY
Qty: 14 CAPSULE | Refills: 0 | Status: SHIPPED | OUTPATIENT
Start: 2024-03-14 | End: 2024-03-21

## 2024-03-14 NOTE — TELEPHONE ENCOUNTER
Wife states C wanted pt to get Labs to check liver and kidney's. Please place orders if still needed and call to advise.

## 2024-03-15 LAB — ANA SER QL IA: NEGATIVE

## 2024-03-17 LAB
BACTERIA SPEC AEROBE CULT: ABNORMAL
BACTERIA SPEC AEROBE CULT: ABNORMAL
BACTERIA SPEC ANAEROBE CULT: ABNORMAL
GRAM STN SPEC: ABNORMAL
ORGANISM: ABNORMAL
ORGANISM: ABNORMAL

## 2024-03-18 NOTE — TELEPHONE ENCOUNTER
I soke with pt wife and relayed message [facility]. She verbalized understanding.

## 2024-03-26 DIAGNOSIS — M05.80 POLYARTHRITIS WITH POSITIVE RHEUMATOID FACTOR (HCC): Primary | ICD-10-CM

## 2024-03-26 DIAGNOSIS — L30.9 DERMATITIS: ICD-10-CM

## 2024-04-15 RX ORDER — TRIAMCINOLONE ACETONIDE 1 MG/G
CREAM TOPICAL
Qty: 454 G | Refills: 0 | Status: SHIPPED | OUTPATIENT
Start: 2024-04-15

## 2024-07-10 DIAGNOSIS — Z82.49 FAMILY HISTORY OF CORONARY ARTERY DISEASE: ICD-10-CM

## 2024-07-10 DIAGNOSIS — E78.5 HYPERLIPIDEMIA, UNSPECIFIED HYPERLIPIDEMIA TYPE: ICD-10-CM

## 2024-07-10 RX ORDER — ROSUVASTATIN CALCIUM 20 MG/1
20 TABLET, COATED ORAL DAILY
Qty: 90 TABLET | Refills: 2 | Status: SHIPPED | OUTPATIENT
Start: 2024-07-10

## 2024-07-10 NOTE — TELEPHONE ENCOUNTER
[facility]     Last OV: 1/23/2024 [facility]    Next OV: 7/30/2024 [facility]    Last Labs: 11/6/2023 Lipid, CMP    Last Filled: 3/5/2024 C

## 2024-07-16 ENCOUNTER — OFFICE VISIT (OUTPATIENT)
Dept: FAMILY MEDICINE CLINIC | Age: 74
End: 2024-07-16
Payer: MEDICARE

## 2024-07-16 VITALS — HEART RATE: 81 BPM | TEMPERATURE: 98.5 F | OXYGEN SATURATION: 95 %

## 2024-07-16 DIAGNOSIS — J01.90 ACUTE BACTERIAL SINUSITIS: Primary | ICD-10-CM

## 2024-07-16 DIAGNOSIS — J40 BRONCHITIS: ICD-10-CM

## 2024-07-16 DIAGNOSIS — B96.89 ACUTE BACTERIAL SINUSITIS: Primary | ICD-10-CM

## 2024-07-16 PROCEDURE — 99213 OFFICE O/P EST LOW 20 MIN: CPT | Performed by: FAMILY MEDICINE

## 2024-07-16 PROCEDURE — 3017F COLORECTAL CA SCREEN DOC REV: CPT | Performed by: FAMILY MEDICINE

## 2024-07-16 PROCEDURE — 1036F TOBACCO NON-USER: CPT | Performed by: FAMILY MEDICINE

## 2024-07-16 PROCEDURE — G8428 CUR MEDS NOT DOCUMENT: HCPCS | Performed by: FAMILY MEDICINE

## 2024-07-16 PROCEDURE — G8417 CALC BMI ABV UP PARAM F/U: HCPCS | Performed by: FAMILY MEDICINE

## 2024-07-16 PROCEDURE — 1123F ACP DISCUSS/DSCN MKR DOCD: CPT | Performed by: FAMILY MEDICINE

## 2024-07-16 RX ORDER — DOXYCYCLINE HYCLATE 100 MG
100 TABLET ORAL 2 TIMES DAILY
Qty: 14 TABLET | Refills: 0 | Status: SHIPPED | OUTPATIENT
Start: 2024-07-16 | End: 2024-07-23

## 2024-07-16 RX ORDER — ALBUTEROL SULFATE 90 UG/1
2 AEROSOL, METERED RESPIRATORY (INHALATION) EVERY 4 HOURS PRN
Qty: 18 G | Refills: 3 | Status: SHIPPED | OUTPATIENT
Start: 2024-07-16

## 2024-07-16 RX ORDER — PREDNISONE 20 MG/1
60 TABLET ORAL DAILY
Qty: 15 TABLET | Refills: 0 | Status: SHIPPED | OUTPATIENT
Start: 2024-07-16 | End: 2024-07-21

## 2024-07-16 NOTE — PROGRESS NOTES
Chief Complaint   Patient presents with    URI     Cough that isn't getting better, sinus drainage, post nasal drip. Using OTC meds but not working as well as was. Sx Started 10 days ago. Non productive cough. No fever. No SOB. No ST. No ear pain.       Smoker: No      Vitals:    07/16/24 1630   Pulse: 81   Temp: 98.5 °F (36.9 °C)   SpO2: 95%     Wt Readings from Last 3 Encounters:   01/23/24 106.1 kg (233 lb 12.8 oz)   12/05/23 103.4 kg (228 lb)   10/31/23 106.1 kg (234 lb)     There is no height or weight on file to calculate BMI.    Alert and oriented x 4 NAD, affect appropriate and obese, well hydrated, well developed.  Left TM nl, canal nl and pinna nl  Right TM nl, canal nl and pinna nl  No nodes neck  Nares red and congested, green drainage  OP mild erythema, no exudate, no swelling  Lung slight exp wheeze thru out with fair movement    CV RRR no M      ASSESSMENT AND PLAN:       Mohamud was seen today for uri.    Diagnoses and all orders for this visit:    Acute bacterial sinusitis    Bronchitis    Other orders  -     doxycycline hyclate (VIBRA-TABS) 100 MG tablet; Take 1 tablet by mouth 2 times daily for 7 days  -     predniSONE (DELTASONE) 20 MG tablet; Take 3 tablets by mouth daily for 5 days  -     albuterol sulfate HFA (PROAIR HFA) 108 (90 Base) MCG/ACT inhaler; Inhale 2 puffs into the lungs every 4 hours as needed for Wheezing or Shortness of Breath        Continue symptomatic treatment with over the counter medications.  Call if symptoms not improving over the next week or worsening symptoms.

## 2024-07-17 NOTE — PROGRESS NOTES
[facility] [place]    7/30/2024    Referring Provider: Mary Chen APRN - NP    HISTORY:  Mohamud Lima is a 73 y.o. male presenting for his routine 6 month follow up. He established with me due to a family history of coronary artery disease. His brother and father have both had previous MIs. He has HTN, mild HLD, T2DM, and LEILANI. He completed an echo 2/2/2024 with EF 55-60%, no regional wall motion abnormalities, indeterminate diastolic function, mild mitral regurgitation, and moderate AI with mildly dilated ascending aorta near 4.0cm. A chest CTA measured her aorta at 3.3cm. He has had ongoing skin irritation that he has been seeing dermatology and rheumatology for it.     Today he arrives alone and is ambulatory. He is staying busy building shelving units. Denies chest pain/pressure/squeezing/tightness, dizziness/lightheadedness, shortness of breath/dyspnea on exertion. Has no limitations while exerting himself but likes to take things slowly. No concerns or questions otherwise.       REVIEW OF SYSTEMS:  A complete review of systems was reviewed and is negative except as noted in the history of present illness.    Prior to Visit Medications    Medication Sig Taking? Authorizing Provider   amLODIPine (NORVASC) 5 MG tablet TAKE 1 TABLET BY MOUTH DAILY Yes Mary Chen APRN - NP   metFORMIN (GLUCOPHAGE) 500 MG tablet TAKE 1 TABLET BY MOUTH DAILY  WITH BREAKFAST Yes Mary Chen APRN - NP   valsartan (DIOVAN) 80 MG tablet Take 1 tablet by mouth daily Yes Randall Novak MD   albuterol sulfate HFA (PROAIR HFA) 108 (90 Base) MCG/ACT inhaler Inhale 2 puffs into the lungs every 4 hours as needed for Wheezing or Shortness of Breath Yes Amber Tolentino MD   rosuvastatin (CRESTOR) 20 MG tablet Take 1 tablet by mouth daily Yes Randall Novak MD   triamcinolone (KENALOG) 0.1 % cream APPLY TWO TIMES A DAY FOR UP TO 2 WEEKS OR UNTIL IMPROVED Yes Nader Fournier MD

## 2024-07-22 NOTE — PATIENT INSTRUCTIONS
Thank you for coming to see me today   Continue to be active   No changes to your medications  Repeat an echocardiogram in 2 years, around 2/2026

## 2024-07-23 DIAGNOSIS — E11.9 TYPE 2 DIABETES MELLITUS WITHOUT COMPLICATION, WITHOUT LONG-TERM CURRENT USE OF INSULIN (HCC): ICD-10-CM

## 2024-07-23 DIAGNOSIS — I10 ESSENTIAL HYPERTENSION: ICD-10-CM

## 2024-07-24 RX ORDER — VALSARTAN 80 MG/1
80 TABLET ORAL DAILY
Qty: 90 TABLET | Refills: 1 | Status: SHIPPED | OUTPATIENT
Start: 2024-07-24

## 2024-07-24 RX ORDER — AMLODIPINE BESYLATE 5 MG/1
5 TABLET ORAL DAILY
Qty: 90 TABLET | Refills: 3 | Status: SHIPPED | OUTPATIENT
Start: 2024-07-24

## 2024-07-24 NOTE — TELEPHONE ENCOUNTER
Requested Prescriptions     Pending Prescriptions Disp Refills    valsartan (DIOVAN) 80 MG tablet 90 tablet 1     Sig: Take 1 tablet by mouth daily     [facility]     Last OV:  1/23/2024 [facility]    Next OV: 7/30/2024 [facility]    Last Labs: 01/25/2024 bmp    Last Filled: 01/23/2024 [facility]

## 2024-07-30 ENCOUNTER — OFFICE VISIT (OUTPATIENT)
Dept: CARDIOLOGY CLINIC | Age: 74
End: 2024-07-30
Payer: MEDICARE

## 2024-07-30 VITALS
HEART RATE: 75 BPM | HEIGHT: 69 IN | BODY MASS INDEX: 33.77 KG/M2 | OXYGEN SATURATION: 95 % | WEIGHT: 228 LBS | SYSTOLIC BLOOD PRESSURE: 150 MMHG | DIASTOLIC BLOOD PRESSURE: 84 MMHG

## 2024-07-30 DIAGNOSIS — G47.33 OSA (OBSTRUCTIVE SLEEP APNEA): ICD-10-CM

## 2024-07-30 DIAGNOSIS — E11.9 TYPE 2 DIABETES MELLITUS WITHOUT COMPLICATION, WITHOUT LONG-TERM CURRENT USE OF INSULIN (HCC): ICD-10-CM

## 2024-07-30 DIAGNOSIS — I35.1 AORTIC VALVE INSUFFICIENCY, ETIOLOGY OF CARDIAC VALVE DISEASE UNSPECIFIED: ICD-10-CM

## 2024-07-30 DIAGNOSIS — E78.5 HYPERLIPIDEMIA, UNSPECIFIED HYPERLIPIDEMIA TYPE: ICD-10-CM

## 2024-07-30 DIAGNOSIS — I10 ESSENTIAL HYPERTENSION: Primary | ICD-10-CM

## 2024-07-30 DIAGNOSIS — Z82.49 FAMILY HISTORY OF CORONARY ARTERY DISEASE: ICD-10-CM

## 2024-07-30 PROCEDURE — G8427 DOCREV CUR MEDS BY ELIG CLIN: HCPCS | Performed by: STUDENT IN AN ORGANIZED HEALTH CARE EDUCATION/TRAINING PROGRAM

## 2024-07-30 PROCEDURE — 99214 OFFICE O/P EST MOD 30 MIN: CPT | Performed by: STUDENT IN AN ORGANIZED HEALTH CARE EDUCATION/TRAINING PROGRAM

## 2024-07-30 PROCEDURE — 1036F TOBACCO NON-USER: CPT | Performed by: STUDENT IN AN ORGANIZED HEALTH CARE EDUCATION/TRAINING PROGRAM

## 2024-07-30 PROCEDURE — 3046F HEMOGLOBIN A1C LEVEL >9.0%: CPT | Performed by: STUDENT IN AN ORGANIZED HEALTH CARE EDUCATION/TRAINING PROGRAM

## 2024-07-30 PROCEDURE — 1123F ACP DISCUSS/DSCN MKR DOCD: CPT | Performed by: STUDENT IN AN ORGANIZED HEALTH CARE EDUCATION/TRAINING PROGRAM

## 2024-07-30 PROCEDURE — 3077F SYST BP >= 140 MM HG: CPT | Performed by: STUDENT IN AN ORGANIZED HEALTH CARE EDUCATION/TRAINING PROGRAM

## 2024-07-30 PROCEDURE — G8417 CALC BMI ABV UP PARAM F/U: HCPCS | Performed by: STUDENT IN AN ORGANIZED HEALTH CARE EDUCATION/TRAINING PROGRAM

## 2024-07-30 PROCEDURE — 3079F DIAST BP 80-89 MM HG: CPT | Performed by: STUDENT IN AN ORGANIZED HEALTH CARE EDUCATION/TRAINING PROGRAM

## 2024-07-30 PROCEDURE — 3017F COLORECTAL CA SCREEN DOC REV: CPT | Performed by: STUDENT IN AN ORGANIZED HEALTH CARE EDUCATION/TRAINING PROGRAM

## 2024-07-30 PROCEDURE — 2022F DILAT RTA XM EVC RTNOPTHY: CPT | Performed by: STUDENT IN AN ORGANIZED HEALTH CARE EDUCATION/TRAINING PROGRAM

## 2024-11-14 ENCOUNTER — TELEPHONE (OUTPATIENT)
Dept: FAMILY MEDICINE CLINIC | Age: 74
End: 2024-11-14

## 2024-12-20 RX ORDER — VALSARTAN 80 MG/1
80 TABLET ORAL DAILY
Qty: 90 TABLET | Refills: 3 | Status: SHIPPED | OUTPATIENT
Start: 2024-12-20

## 2024-12-20 NOTE — TELEPHONE ENCOUNTER
Received refill request for [id number] MG  from [facility] pharmacy.     Last OV: 07/30/24    Next OV: NONe    Last Labs: 01/25/24    Last Filled: 07/24/24

## 2024-12-23 RX ORDER — VALSARTAN 80 MG/1
80 TABLET ORAL DAILY
Qty: 90 TABLET | Refills: 3 | Status: SHIPPED | OUTPATIENT
Start: 2024-12-23

## 2024-12-23 NOTE — TELEPHONE ENCOUNTER
Previously sent to [facility]. [facility]

## 2025-01-08 ENCOUNTER — OFFICE VISIT (OUTPATIENT)
Dept: FAMILY MEDICINE CLINIC | Age: 75
End: 2025-01-08

## 2025-01-08 VITALS
WEIGHT: 237 LBS | SYSTOLIC BLOOD PRESSURE: 122 MMHG | DIASTOLIC BLOOD PRESSURE: 68 MMHG | OXYGEN SATURATION: 96 % | TEMPERATURE: 98.2 F | BODY MASS INDEX: 35.1 KG/M2 | HEIGHT: 69 IN | HEART RATE: 69 BPM

## 2025-01-08 DIAGNOSIS — E11.9 TYPE 2 DIABETES MELLITUS WITHOUT COMPLICATION, WITHOUT LONG-TERM CURRENT USE OF INSULIN (HCC): ICD-10-CM

## 2025-01-08 DIAGNOSIS — C61 PROSTATE CANCER (HCC): ICD-10-CM

## 2025-01-08 DIAGNOSIS — I10 ESSENTIAL HYPERTENSION: ICD-10-CM

## 2025-01-08 DIAGNOSIS — J96.11 CHRONIC HYPOXEMIC RESPIRATORY FAILURE: ICD-10-CM

## 2025-01-08 DIAGNOSIS — Z00.00 ENCOUNTER FOR ANNUAL WELLNESS VISIT (AWV) IN MEDICARE PATIENT: Primary | ICD-10-CM

## 2025-01-08 DIAGNOSIS — J84.10 PULMONARY FIBROSIS (HCC): ICD-10-CM

## 2025-01-08 DIAGNOSIS — E78.2 MIXED HYPERLIPIDEMIA: ICD-10-CM

## 2025-01-08 SDOH — ECONOMIC STABILITY: FOOD INSECURITY: WITHIN THE PAST 12 MONTHS, THE FOOD YOU BOUGHT JUST DIDN'T LAST AND YOU DIDN'T HAVE MONEY TO GET MORE.: NEVER TRUE

## 2025-01-08 SDOH — ECONOMIC STABILITY: FOOD INSECURITY: WITHIN THE PAST 12 MONTHS, YOU WORRIED THAT YOUR FOOD WOULD RUN OUT BEFORE YOU GOT MONEY TO BUY MORE.: NEVER TRUE

## 2025-01-08 ASSESSMENT — PATIENT HEALTH QUESTIONNAIRE - PHQ9
SUM OF ALL RESPONSES TO PHQ QUESTIONS 1-9: 0
SUM OF ALL RESPONSES TO PHQ9 QUESTIONS 1 & 2: 0
SUM OF ALL RESPONSES TO PHQ QUESTIONS 1-9: 0
1. LITTLE INTEREST OR PLEASURE IN DOING THINGS: NOT AT ALL
SUM OF ALL RESPONSES TO PHQ QUESTIONS 1-9: 0
2. FEELING DOWN, DEPRESSED OR HOPELESS: NOT AT ALL
SUM OF ALL RESPONSES TO PHQ QUESTIONS 1-9: 0

## 2025-01-08 NOTE — PROGRESS NOTES
use of insulin (HCC)  -     Albumin/Creatinine Ratio, Urine; Future  -     Hemoglobin A1C; Future  -     Comprehensive Metabolic Panel, Fasting; Future  -      DIABETES FOOT EXAM    Essential hypertension  -     CBC with Auto Differential; Future  -     Comprehensive Metabolic Panel, Fasting; Future  Controlled  Advised CPAP compliance    Chronic hypoxemic respiratory failure  Controlled    Pulmonary fibrosis (HCC)  Followed by pulmonology          Return in about 1 year (around 1/8/2026) for annual check up.

## 2025-01-13 ENCOUNTER — HOSPITAL ENCOUNTER (OUTPATIENT)
Age: 75
Discharge: HOME | End: 2025-01-13
Payer: MEDICARE

## 2025-01-13 DIAGNOSIS — E11.9 TYPE 2 DIABETES MELLITUS WITHOUT COMPLICATION, WITHOUT LONG-TERM CURRENT USE OF INSULIN (HCC): ICD-10-CM

## 2025-01-13 DIAGNOSIS — I10 ESSENTIAL HYPERTENSION: ICD-10-CM

## 2025-01-13 DIAGNOSIS — E78.2 MIXED HYPERLIPIDEMIA: ICD-10-CM

## 2025-01-13 LAB
ALBUMIN SERPL-MCNC: 4.2 G/DL (ref 3.4–5)
ALBUMIN/GLOB SERPL: 1.6 {RATIO} (ref 1.1–2.2)
ALP SERPL-CCNC: 82 U/L (ref 40–129)
ALT SERPL-CCNC: 51 U/L (ref 10–40)
ANION GAP SERPL CALCULATED.3IONS-SCNC: 11 MMOL/L (ref 3–16)
AST SERPL-CCNC: 58 U/L (ref 15–37)
BASOPHILS # BLD: 0.1 K/UL (ref 0–0.2)
BASOPHILS NFR BLD: 0.9 %
BILIRUB SERPL-MCNC: 0.3 MG/DL (ref 0–1)
BUN SERPL-MCNC: 21 MG/DL (ref 7–20)
CALCIUM SERPL-MCNC: 9.5 MG/DL (ref 8.3–10.6)
CHLORIDE SERPL-SCNC: 105 MMOL/L (ref 99–110)
CHOLEST SERPL-MCNC: 178 MG/DL (ref 0–199)
CO2 SERPL-SCNC: 23 MMOL/L (ref 21–32)
CREAT SERPL-MCNC: 1.2 MG/DL (ref 0.8–1.3)
CREAT UR-MCNC: 184 MG/DL (ref 39–259)
DEPRECATED RDW RBC AUTO: 15.4 % (ref 12.4–15.4)
EOSINOPHIL # BLD: 0.1 K/UL (ref 0–0.6)
EOSINOPHIL NFR BLD: 2 %
EST. AVERAGE GLUCOSE BLD GHB EST-MCNC: 125.5 MG/DL
GFR SERPLBLD CREATININE-BSD FMLA CKD-EPI: 63 ML/MIN/{1.73_M2}
GLUCOSE P FAST SERPL-MCNC: 122 MG/DL (ref 70–99)
HBA1C MFR BLD: 6 %
HCT VFR BLD AUTO: 40.4 % (ref 40.5–52.5)
HDLC SERPL-MCNC: 33 MG/DL (ref 40–60)
HGB BLD-MCNC: 13.9 G/DL (ref 13.5–17.5)
LDL CHOLESTEROL: 109 MG/DL
LYMPHOCYTES # BLD: 1.8 K/UL (ref 1–5.1)
LYMPHOCYTES NFR BLD: 27.7 %
MCH RBC QN AUTO: 29.7 PG (ref 26–34)
MCHC RBC AUTO-ENTMCNC: 34.3 G/DL (ref 31–36)
MCV RBC AUTO: 86.5 FL (ref 80–100)
MICROALBUMIN UR DL<=1MG/L-MCNC: <1.2 MG/DL
MICROALBUMIN/CREAT UR: NORMAL MG/G (ref 0–30)
MONOCYTES # BLD: 0.6 K/UL (ref 0–1.3)
MONOCYTES NFR BLD: 9 %
NEUTROPHILS # BLD: 4 K/UL (ref 1.7–7.7)
NEUTROPHILS NFR BLD: 60.4 %
PLATELET # BLD AUTO: 242 K/UL (ref 135–450)
PMV BLD AUTO: 7.2 FL (ref 5–10.5)
POTASSIUM SERPL-SCNC: 4.1 MMOL/L (ref 3.5–5.1)
PROT SERPL-MCNC: 6.9 G/DL (ref 6.4–8.2)
RBC # BLD AUTO: 4.67 M/UL (ref 4.2–5.9)
SODIUM SERPL-SCNC: 139 MMOL/L (ref 136–145)
TRIGL SERPL-MCNC: 181 MG/DL (ref 0–150)
VLDLC SERPL CALC-MCNC: 36 MG/DL
WBC # BLD AUTO: 6.7 K/UL (ref 4–11)

## 2025-01-13 PROCEDURE — 85025 COMPLETE CBC W/AUTO DIFF WBC: CPT

## 2025-01-13 PROCEDURE — 82570 ASSAY OF URINE CREATININE: CPT

## 2025-01-13 PROCEDURE — 36415 COLL VENOUS BLD VENIPUNCTURE: CPT

## 2025-01-13 PROCEDURE — 82043 UR ALBUMIN QUANTITATIVE: CPT

## 2025-01-13 PROCEDURE — 80061 LIPID PANEL: CPT

## 2025-01-13 PROCEDURE — 80053 COMPREHEN METABOLIC PANEL: CPT

## 2025-01-13 PROCEDURE — 83036 HEMOGLOBIN GLYCOSYLATED A1C: CPT

## 2025-01-15 DIAGNOSIS — E11.9 TYPE 2 DIABETES MELLITUS WITHOUT COMPLICATION, WITHOUT LONG-TERM CURRENT USE OF INSULIN (HCC): Primary | ICD-10-CM

## 2025-01-15 DIAGNOSIS — E78.2 MIXED HYPERLIPIDEMIA: ICD-10-CM

## 2025-01-15 DIAGNOSIS — I10 ESSENTIAL HYPERTENSION: ICD-10-CM

## 2025-02-03 RX ORDER — TRIAMCINOLONE ACETONIDE 1 MG/G
CREAM TOPICAL
Qty: 454 G | Refills: 0 | Status: SHIPPED | OUTPATIENT
Start: 2025-02-03

## 2025-02-19 DIAGNOSIS — E78.5 HYPERLIPIDEMIA, UNSPECIFIED HYPERLIPIDEMIA TYPE: ICD-10-CM

## 2025-02-19 DIAGNOSIS — Z82.49 FAMILY HISTORY OF CORONARY ARTERY DISEASE: ICD-10-CM

## 2025-02-19 RX ORDER — ROSUVASTATIN CALCIUM 20 MG/1
20 TABLET, COATED ORAL DAILY
Qty: 90 TABLET | Refills: 3 | Status: SHIPPED | OUTPATIENT
Start: 2025-02-19

## 2025-03-13 DIAGNOSIS — Z82.49 FAMILY HISTORY OF CORONARY ARTERY DISEASE: ICD-10-CM

## 2025-03-13 DIAGNOSIS — E78.5 HYPERLIPIDEMIA, UNSPECIFIED HYPERLIPIDEMIA TYPE: ICD-10-CM

## 2025-03-13 RX ORDER — ROSUVASTATIN CALCIUM 20 MG/1
20 TABLET, COATED ORAL DAILY
Qty: 90 TABLET | Refills: 3 | Status: SHIPPED | OUTPATIENT
Start: 2025-03-13

## 2025-03-13 NOTE — TELEPHONE ENCOUNTER
Requested Prescriptions     Pending Prescriptions Disp Refills    rosuvastatin (CRESTOR) 20 MG tablet 90 tablet 3     Sig: Take 1 tablet by mouth daily      Last OV:  2024 [facility]    Next OV: 2025 [facility]    Last [id number]     Last Filled: 2025 [facility]

## 2025-04-14 ENCOUNTER — OFFICE VISIT (OUTPATIENT)
Dept: FAMILY MEDICINE CLINIC | Age: 75
End: 2025-04-14
Payer: MEDICARE

## 2025-04-14 VITALS — HEART RATE: 76 BPM | TEMPERATURE: 98.7 F | OXYGEN SATURATION: 98 %

## 2025-04-14 DIAGNOSIS — J40 BRONCHITIS: Primary | ICD-10-CM

## 2025-04-14 PROCEDURE — G8428 CUR MEDS NOT DOCUMENT: HCPCS | Performed by: STUDENT IN AN ORGANIZED HEALTH CARE EDUCATION/TRAINING PROGRAM

## 2025-04-14 PROCEDURE — G2211 COMPLEX E/M VISIT ADD ON: HCPCS | Performed by: STUDENT IN AN ORGANIZED HEALTH CARE EDUCATION/TRAINING PROGRAM

## 2025-04-14 PROCEDURE — 1123F ACP DISCUSS/DSCN MKR DOCD: CPT | Performed by: STUDENT IN AN ORGANIZED HEALTH CARE EDUCATION/TRAINING PROGRAM

## 2025-04-14 PROCEDURE — G8417 CALC BMI ABV UP PARAM F/U: HCPCS | Performed by: STUDENT IN AN ORGANIZED HEALTH CARE EDUCATION/TRAINING PROGRAM

## 2025-04-14 PROCEDURE — 1036F TOBACCO NON-USER: CPT | Performed by: STUDENT IN AN ORGANIZED HEALTH CARE EDUCATION/TRAINING PROGRAM

## 2025-04-14 PROCEDURE — 99213 OFFICE O/P EST LOW 20 MIN: CPT | Performed by: STUDENT IN AN ORGANIZED HEALTH CARE EDUCATION/TRAINING PROGRAM

## 2025-04-14 PROCEDURE — 3017F COLORECTAL CA SCREEN DOC REV: CPT | Performed by: STUDENT IN AN ORGANIZED HEALTH CARE EDUCATION/TRAINING PROGRAM

## 2025-04-14 RX ORDER — BENZONATATE 100 MG/1
100-200 CAPSULE ORAL 3 TIMES DAILY PRN
Qty: 60 CAPSULE | Refills: 0 | Status: SHIPPED | OUTPATIENT
Start: 2025-04-14 | End: 2025-04-24

## 2025-04-14 RX ORDER — DOXYCYCLINE HYCLATE 100 MG
100 TABLET ORAL 2 TIMES DAILY
Qty: 10 TABLET | Refills: 0 | Status: SHIPPED | OUTPATIENT
Start: 2025-04-14 | End: 2025-04-19

## 2025-04-14 NOTE — PROGRESS NOTES
Sick Visit  2025  Patient Name: Mohamud Lima  MRN: [id number] : 1950    SUBJECTIVE:     CHIEF COMPLAINT:  Chief Complaint   Patient presents with    Cough       HISTORY OF PRESENT ILLNESS:  He presents today with the following concerns:    Cold Symptoms:  - Symptom onset: 2 weeks.   - Fever: No  - Cough: Yes. It is getting better. His cough was worse last week. Only coughing 4-5 times per day now.    Productive? Yes. He has yellow mucous. No blood.  - Shortness of breath: No  - Nasal congestion: Yes, clear rhinorrhea.  - Sore throat: No  - Ear pain: No  - Headache: No  - Body aches: No  - N/V/D: No  - Fatigue: No  - At home remedies: He tried a mucous relief medication (likely Dayquil and Nyquil). This helped a lot, but didn't fully clear his symptoms.   - Sick contacts? Yes. His grandson got sick first, then his wife got sick.    Other: No    Medications:  Current Outpatient Medications   Medication Sig Dispense Refill    doxycycline hyclate (VIBRA-TABS) 100 MG tablet Take 1 tablet by mouth 2 times daily for 5 days 10 tablet 0    benzonatate (TESSALON) 100 MG capsule Take 1-2 capsules by mouth 3 times daily as needed for Cough 60 capsule 0    rosuvastatin (CRESTOR) 20 MG tablet Take 1 tablet by mouth daily 90 tablet 3    triamcinolone (KENALOG) 0.1 % cream APPLY TO AFFECTED AREA(S) TWO TIMES A DAY FOR UP TO 2 WEEKS OR UNTIL IMPROVED 454 g 0    valsartan (DIOVAN) 80 MG tablet Take 1 tablet by mouth daily 90 tablet 3    amLODIPine (NORVASC) 5 MG tablet TAKE 1 TABLET BY MOUTH DAILY 90 tablet 3    metFORMIN (GLUCOPHAGE) 500 MG tablet TAKE 1 TABLET BY MOUTH DAILY  WITH BREAKFAST 90 tablet 3    albuterol sulfate HFA (PROAIR HFA) 108 (90 Base) MCG/ACT inhaler Inhale 2 puffs into the lungs every 4 hours as needed for Wheezing or Shortness of Breath 18 g 3    hydrOXYzine HCl (ATARAX) 25 MG tablet Take 1 tablet by mouth every 8 hours as needed for Itching 30 tablet 1    clobetasol (TEMOVATE) 0.05 % cream

## 2025-05-05 ENCOUNTER — OFFICE VISIT (OUTPATIENT)
Dept: FAMILY MEDICINE CLINIC | Age: 75
End: 2025-05-05

## 2025-05-05 VITALS — WEIGHT: 232 LBS | BODY MASS INDEX: 34.26 KG/M2 | TEMPERATURE: 97.8 F | OXYGEN SATURATION: 95 % | HEART RATE: 75 BPM

## 2025-05-05 DIAGNOSIS — L30.9 DERMATITIS: ICD-10-CM

## 2025-05-05 RX ORDER — TRIAMCINOLONE ACETONIDE 40 MG/ML
40 INJECTION, SUSPENSION INTRA-ARTICULAR; INTRAMUSCULAR ONCE
Status: COMPLETED | OUTPATIENT
Start: 2025-05-05 | End: 2025-05-05

## 2025-05-05 RX ORDER — HYDROXYZINE HYDROCHLORIDE 25 MG/1
25 TABLET, FILM COATED ORAL EVERY 8 HOURS PRN
Qty: 30 TABLET | Refills: 1 | Status: SHIPPED | OUTPATIENT
Start: 2025-05-05

## 2025-05-05 RX ADMIN — TRIAMCINOLONE ACETONIDE 40 MG: 40 INJECTION, SUSPENSION INTRA-ARTICULAR; INTRAMUSCULAR at 16:31

## 2025-05-05 RX ADMIN — TRIAMCINOLONE ACETONIDE 40 MG: 40 INJECTION, SUSPENSION INTRA-ARTICULAR; INTRAMUSCULAR at 16:30

## 2025-05-05 NOTE — PROGRESS NOTES
Mohamud Lima  : 1950  Encounter date: 2025    This colleen 74 y.o. male who presents with  Chief Complaint   Patient presents with    Rash     Rash on both arms, and right side. Started Friday night, always outside. No lifestyle changes to knowledge.        History of present illness:    HPI Pt is 74 year old male for follow up on dermatitis bilateral upper and lower arms and R side torso started 3 days ago.  Denies known exposure to plant, chemical, change in detergent or medications.  Previously established with Dr. Fournier, dermatology.  Pt reports very itchy but non spreading, has applied topical triamcinolone and clobetasol with little relief.  Reports both macupapular and vesicular, denies weeping.  No other systemic symptoms.  Not sure if dermatitis has been biopsied in past, will reach out to Dermatology.    Current Outpatient Medications on File Prior to Visit   Medication Sig Dispense Refill    rosuvastatin (CRESTOR) 20 MG tablet Take 1 tablet by mouth daily 90 tablet 3    triamcinolone (KENALOG) 0.1 % cream APPLY TO AFFECTED AREA(S) TWO TIMES A DAY FOR UP TO 2 WEEKS OR UNTIL IMPROVED 454 g 0    valsartan (DIOVAN) 80 MG tablet Take 1 tablet by mouth daily 90 tablet 3    amLODIPine (NORVASC) 5 MG tablet TAKE 1 TABLET BY MOUTH DAILY 90 tablet 3    metFORMIN (GLUCOPHAGE) 500 MG tablet TAKE 1 TABLET BY MOUTH DAILY  WITH BREAKFAST 90 tablet 3    albuterol sulfate HFA (PROAIR HFA) 108 (90 Base) MCG/ACT inhaler Inhale 2 puffs into the lungs every 4 hours as needed for Wheezing or Shortness of Breath 18 g 3    clobetasol (TEMOVATE) 0.05 % cream Apply to affected areas on the lower back and forearms twice daily for up to 2 weeks or until improved. 30 g 2    oxybutynin (DITROPAN-XL) 5 MG extended release tablet       Tadalafil, PAH, 20 MG tablet        No current facility-administered medications on file prior to visit.      Allergies   Allergen Reactions    Neomycin Dermatitis     Past Medical History:

## 2025-06-04 DIAGNOSIS — I10 ESSENTIAL HYPERTENSION: ICD-10-CM

## 2025-06-04 DIAGNOSIS — E11.9 TYPE 2 DIABETES MELLITUS WITHOUT COMPLICATION, WITHOUT LONG-TERM CURRENT USE OF INSULIN (HCC): ICD-10-CM

## 2025-06-05 RX ORDER — AMLODIPINE BESYLATE 5 MG/1
5 TABLET ORAL DAILY
Qty: 90 TABLET | Refills: 3 | Status: SHIPPED | OUTPATIENT
Start: 2025-06-05

## 2025-07-30 NOTE — PATIENT INSTRUCTIONS
Thank you for coming to see me today     Continue to be active     Increase your rosuvastatin to 40mg daily    Repeat a lipid panel at least 3 months after the increased dose of rosuvastatin    Schedule echo (ultrasound of the heart)

## 2025-08-01 ENCOUNTER — OFFICE VISIT (OUTPATIENT)
Dept: CARDIOLOGY CLINIC | Age: 75
End: 2025-08-01
Payer: MEDICARE

## 2025-08-01 VITALS
BODY MASS INDEX: 33.23 KG/M2 | SYSTOLIC BLOOD PRESSURE: 134 MMHG | WEIGHT: 225 LBS | HEART RATE: 84 BPM | OXYGEN SATURATION: 97 % | DIASTOLIC BLOOD PRESSURE: 86 MMHG

## 2025-08-01 DIAGNOSIS — I35.1 AORTIC VALVE INSUFFICIENCY, ETIOLOGY OF CARDIAC VALVE DISEASE UNSPECIFIED: Primary | ICD-10-CM

## 2025-08-01 DIAGNOSIS — I10 ESSENTIAL HYPERTENSION: ICD-10-CM

## 2025-08-01 DIAGNOSIS — G47.33 OSA (OBSTRUCTIVE SLEEP APNEA): ICD-10-CM

## 2025-08-01 DIAGNOSIS — R01.1 HEART MURMUR: ICD-10-CM

## 2025-08-01 DIAGNOSIS — Z82.49 FAMILY HISTORY OF CORONARY ARTERY DISEASE: ICD-10-CM

## 2025-08-01 DIAGNOSIS — E78.5 HYPERLIPIDEMIA, UNSPECIFIED HYPERLIPIDEMIA TYPE: ICD-10-CM

## 2025-08-01 DIAGNOSIS — E11.9 TYPE 2 DIABETES MELLITUS WITHOUT COMPLICATION, WITHOUT LONG-TERM CURRENT USE OF INSULIN (HCC): ICD-10-CM

## 2025-08-01 PROCEDURE — G2211 COMPLEX E/M VISIT ADD ON: HCPCS | Performed by: STUDENT IN AN ORGANIZED HEALTH CARE EDUCATION/TRAINING PROGRAM

## 2025-08-01 PROCEDURE — 2022F DILAT RTA XM EVC RTNOPTHY: CPT | Performed by: STUDENT IN AN ORGANIZED HEALTH CARE EDUCATION/TRAINING PROGRAM

## 2025-08-01 PROCEDURE — 1159F MED LIST DOCD IN RCRD: CPT | Performed by: STUDENT IN AN ORGANIZED HEALTH CARE EDUCATION/TRAINING PROGRAM

## 2025-08-01 PROCEDURE — 3044F HG A1C LEVEL LT 7.0%: CPT | Performed by: STUDENT IN AN ORGANIZED HEALTH CARE EDUCATION/TRAINING PROGRAM

## 2025-08-01 PROCEDURE — 1123F ACP DISCUSS/DSCN MKR DOCD: CPT | Performed by: STUDENT IN AN ORGANIZED HEALTH CARE EDUCATION/TRAINING PROGRAM

## 2025-08-01 PROCEDURE — G8417 CALC BMI ABV UP PARAM F/U: HCPCS | Performed by: STUDENT IN AN ORGANIZED HEALTH CARE EDUCATION/TRAINING PROGRAM

## 2025-08-01 PROCEDURE — 93000 ELECTROCARDIOGRAM COMPLETE: CPT | Performed by: STUDENT IN AN ORGANIZED HEALTH CARE EDUCATION/TRAINING PROGRAM

## 2025-08-01 PROCEDURE — 3017F COLORECTAL CA SCREEN DOC REV: CPT | Performed by: STUDENT IN AN ORGANIZED HEALTH CARE EDUCATION/TRAINING PROGRAM

## 2025-08-01 PROCEDURE — 99214 OFFICE O/P EST MOD 30 MIN: CPT | Performed by: STUDENT IN AN ORGANIZED HEALTH CARE EDUCATION/TRAINING PROGRAM

## 2025-08-01 PROCEDURE — 3075F SYST BP GE 130 - 139MM HG: CPT | Performed by: STUDENT IN AN ORGANIZED HEALTH CARE EDUCATION/TRAINING PROGRAM

## 2025-08-01 PROCEDURE — 3079F DIAST BP 80-89 MM HG: CPT | Performed by: STUDENT IN AN ORGANIZED HEALTH CARE EDUCATION/TRAINING PROGRAM

## 2025-08-01 PROCEDURE — G8427 DOCREV CUR MEDS BY ELIG CLIN: HCPCS | Performed by: STUDENT IN AN ORGANIZED HEALTH CARE EDUCATION/TRAINING PROGRAM

## 2025-08-01 PROCEDURE — 1036F TOBACCO NON-USER: CPT | Performed by: STUDENT IN AN ORGANIZED HEALTH CARE EDUCATION/TRAINING PROGRAM

## 2025-08-01 RX ORDER — ROSUVASTATIN CALCIUM 40 MG/1
40 TABLET, COATED ORAL DAILY
Qty: 90 TABLET | Refills: 3 | Status: SHIPPED | OUTPATIENT
Start: 2025-08-01

## 2025-08-26 ENCOUNTER — OFFICE VISIT (OUTPATIENT)
Dept: FAMILY MEDICINE CLINIC | Age: 75
End: 2025-08-26

## 2025-08-26 VITALS
SYSTOLIC BLOOD PRESSURE: 120 MMHG | WEIGHT: 227.6 LBS | OXYGEN SATURATION: 95 % | TEMPERATURE: 97.4 F | DIASTOLIC BLOOD PRESSURE: 60 MMHG | HEART RATE: 61 BPM | BODY MASS INDEX: 33.61 KG/M2

## 2025-08-26 DIAGNOSIS — J30.1 SEASONAL ALLERGIC RHINITIS DUE TO POLLEN: Primary | ICD-10-CM

## 2025-08-26 PROBLEM — J18.9 PNEUMONIA: Status: RESOLVED | Noted: 2021-08-19 | Resolved: 2025-08-26

## 2025-08-26 PROBLEM — J12.82 PNEUMONIA DUE TO COVID-19 VIRUS: Status: RESOLVED | Noted: 2021-08-20 | Resolved: 2025-08-26

## 2025-08-26 PROBLEM — U07.1 PNEUMONIA DUE TO COVID-19 VIRUS: Status: RESOLVED | Noted: 2021-08-20 | Resolved: 2025-08-26

## 2025-08-26 PROBLEM — J96.01 ACUTE RESPIRATORY FAILURE WITH HYPOXIA (HCC): Status: RESOLVED | Noted: 2021-08-20 | Resolved: 2025-08-26

## (undated) DEVICE — LAPAROSCOPIC SCISSORS: Brand: EPIX LAPAROSCOPIC SCISSORS

## (undated) DEVICE — TRI-LUMEN FILTERED TUBE SET WITH ACTIVATED CHARCOAL FILTER: Brand: AIRSEAL

## (undated) DEVICE — PAD PT POS 36 IN SURGYPAD DISP

## (undated) DEVICE — BLANKET WRM W29.9XL79.1IN UP BODY FORC AIR MISTRAL-AIR

## (undated) DEVICE — TIP COVER ACCESSORY

## (undated) DEVICE — TROCAR: Brand: KII FIOS FIRST ENTRY

## (undated) DEVICE — INSUFFLATION NEEDLE TO ESTABLISH PNEUMOPERITONEUM.: Brand: INSUFFLATION NEEDLE

## (undated) DEVICE — SUTURE DEV SZ 2-0 WND CLSR ABSRB GS-22 VLOC COVIDIEN VLOCM2145

## (undated) DEVICE — SUTURE V-LOC 90 3-0 L6IN ABSRB UD CV-23 L17MM 1/2 CIR VLOCM1904

## (undated) DEVICE — CLIP INT M L POLYMER LOK LIG HEM O LOK

## (undated) DEVICE — 3M™ STERI-DRAPE™ INCISE DRAPE 1050 (60CM X 45CM): Brand: STERI-DRAPE™

## (undated) DEVICE — BLADELESS OBTURATOR: Brand: WECK VISTA

## (undated) DEVICE — SOLUTION IRRIG 1000ML 0.9% SOD CHL USP POUR PLAS BTL

## (undated) DEVICE — BAG,DRAINAGE,4L,A/R TOWER,LL,SLIDE TAP: Brand: MEDLINE

## (undated) DEVICE — SUTURE MCRYL + SZ 4-0 L27IN ABSRB UD L19MM PS-2 3/8 CIR MCP426H

## (undated) DEVICE — SUTURE V-LOC 90 3-0 L6IN ABSRB UD L26MM V-20 1/2 CIR TAPR VLOCM2004

## (undated) DEVICE — AIRSEAL 8 MM ACCESS PORT AND LOW PROFILE OBTURATOR WITH BLADELESS OPTICAL TIP, 120 MM LENGTH: Brand: AIRSEAL

## (undated) DEVICE — STERILE LATEX POWDER FREE SURGICAL GLOVES WITH HYDROGEL COATING: Brand: PROTEXIS

## (undated) DEVICE — PENCIL ES ULT VAC W TELSCP NOSE EZ CLN BLDE 10FT

## (undated) DEVICE — ANCHOR TISSUE RETRIEVAL SYSTEM, BAG SIZE 125 ML, PORT SIZE 8 MM: Brand: ANCHOR TISSUE RETRIEVAL SYSTEM

## (undated) DEVICE — CANNULA SEAL

## (undated) DEVICE — COLUMN DRAPE

## (undated) DEVICE — MERCY FAIRFIELD TURNOVER KIT: Brand: MEDLINE INDUSTRIES, INC.

## (undated) DEVICE — SUTURE VCRL + SZ 3-0 L18IN ABSRB UD SH 1/2 CIR TAPERCUT NDL VCP864D

## (undated) DEVICE — APPLICATOR MEDICATED 26 CC SOLUTION HI LT ORNG CHLORAPREP

## (undated) DEVICE — ARM DRAPE

## (undated) DEVICE — SYRINGE, LUER LOCK, 10ML: Brand: MEDLINE

## (undated) DEVICE — LARGE NEEDLE DRIVER: Brand: ENDOWRIST

## (undated) DEVICE — ADHESIVE SKIN CLSR 0.7ML TOP DERMBND ADV

## (undated) DEVICE — SOLUTION IV IRRIG WATER 1000ML POUR BRL 2F7114

## (undated) DEVICE — SUTURE VLOC 90 2/0 VL 6 GS-22 VLOCM2105

## (undated) DEVICE — LIGHT HANDLE: Brand: DEVON

## (undated) DEVICE — PROTECTOR EYE PT SELF ADH NS OPT GRD LF

## (undated) DEVICE — TISSUE RETRIEVAL SYSTEM: Brand: INZII RETRIEVAL SYSTEM

## (undated) DEVICE — INVIEW CLEAR LEGGINGS: Brand: CONVERTORS

## (undated) DEVICE — 30977 SEE SHARP - ENHANCED INTRAOPERATIVE LAPAROSCOPE CLEANING & DEFOGGING: Brand: 30977 SEE SHARP - ENHANCED INTRAOPERATIVE LAPAROSCOPE CLEANING & DEFOGGING

## (undated) DEVICE — ROBOTIC PK

## (undated) DEVICE — ELECTRODE PT RET AD L9FT HI MOIST COND ADH HYDRGEL CORDED

## (undated) DEVICE — TRAY PREP DRY W/ PREM GLV 2 APPL 6 SPNG 2 UNDPD 1 OVERWRAP

## (undated) DEVICE — COVER EQUIP STEEP TREND EZ CLN UP WTRPRF DISP FOR STD SZ

## (undated) DEVICE — BLADE CLIPPER GEN PURP NS

## (undated) DEVICE — CATHETER URETH 18FR 30CC BLLN SIL ELASTMR F 2 W

## (undated) DEVICE — TOTAL TRAY, DB, 100% SILI FOLEY, 18FR 10: Brand: MEDLINE